# Patient Record
Sex: MALE | Race: WHITE | NOT HISPANIC OR LATINO | Employment: OTHER | URBAN - METROPOLITAN AREA
[De-identification: names, ages, dates, MRNs, and addresses within clinical notes are randomized per-mention and may not be internally consistent; named-entity substitution may affect disease eponyms.]

---

## 2020-01-01 ENCOUNTER — APPOINTMENT (OUTPATIENT)
Dept: CARDIOLOGY | Facility: MEDICAL CENTER | Age: 65
DRG: 064 | End: 2020-01-01
Attending: STUDENT IN AN ORGANIZED HEALTH CARE EDUCATION/TRAINING PROGRAM
Payer: COMMERCIAL

## 2020-01-01 ENCOUNTER — APPOINTMENT (OUTPATIENT)
Dept: RADIOLOGY | Facility: MEDICAL CENTER | Age: 65
DRG: 064 | End: 2020-01-01
Attending: STUDENT IN AN ORGANIZED HEALTH CARE EDUCATION/TRAINING PROGRAM
Payer: COMMERCIAL

## 2020-01-01 ENCOUNTER — HOSPITAL ENCOUNTER (OUTPATIENT)
Dept: RADIOLOGY | Facility: MEDICAL CENTER | Age: 65
End: 2020-09-14
Payer: MEDICARE

## 2020-01-01 ENCOUNTER — APPOINTMENT (OUTPATIENT)
Dept: RADIOLOGY | Facility: MEDICAL CENTER | Age: 65
DRG: 064 | End: 2020-01-01
Attending: INTERNAL MEDICINE
Payer: COMMERCIAL

## 2020-01-01 ENCOUNTER — HOSPITAL ENCOUNTER (INPATIENT)
Facility: REHABILITATION | Age: 65
End: 2020-01-01
Attending: PHYSICAL MEDICINE & REHABILITATION | Admitting: PHYSICAL MEDICINE & REHABILITATION
Payer: MEDICARE

## 2020-01-01 ENCOUNTER — HOSPITAL ENCOUNTER (INPATIENT)
Facility: MEDICAL CENTER | Age: 65
LOS: 16 days | DRG: 064 | End: 2020-12-30
Attending: HOSPITALIST | Admitting: HOSPITALIST
Payer: COMMERCIAL

## 2020-01-01 ENCOUNTER — HOSPITAL ENCOUNTER (OUTPATIENT)
Dept: RADIOLOGY | Facility: MEDICAL CENTER | Age: 65
End: 2020-12-15
Attending: STUDENT IN AN ORGANIZED HEALTH CARE EDUCATION/TRAINING PROGRAM

## 2020-01-01 VITALS
TEMPERATURE: 98.2 F | RESPIRATION RATE: 22 BRPM | SYSTOLIC BLOOD PRESSURE: 55 MMHG | BODY MASS INDEX: 25.37 KG/M2 | HEIGHT: 66 IN | WEIGHT: 157.85 LBS | HEART RATE: 123 BPM | OXYGEN SATURATION: 96 % | DIASTOLIC BLOOD PRESSURE: 38 MMHG

## 2020-01-01 DIAGNOSIS — J12.82 PNEUMONIA DUE TO COVID-19 VIRUS: ICD-10-CM

## 2020-01-01 DIAGNOSIS — I63.9 CEREBROVASCULAR ACCIDENT (CVA), UNSPECIFIED MECHANISM (HCC): ICD-10-CM

## 2020-01-01 DIAGNOSIS — U07.1 PNEUMONIA DUE TO COVID-19 VIRUS: ICD-10-CM

## 2020-01-01 DIAGNOSIS — J96.01 ACUTE RESPIRATORY FAILURE WITH HYPOXIA (HCC): ICD-10-CM

## 2020-01-01 LAB
ALBUMIN SERPL BCP-MCNC: 3.2 G/DL (ref 3.2–4.9)
ALBUMIN SERPL BCP-MCNC: 3.2 G/DL (ref 3.2–4.9)
ALBUMIN SERPL BCP-MCNC: 3.3 G/DL (ref 3.2–4.9)
ALBUMIN SERPL BCP-MCNC: 3.4 G/DL (ref 3.2–4.9)
ALBUMIN SERPL BCP-MCNC: 3.4 G/DL (ref 3.2–4.9)
ALBUMIN SERPL BCP-MCNC: 3.6 G/DL (ref 3.2–4.9)
ALBUMIN SERPL BCP-MCNC: 3.6 G/DL (ref 3.2–4.9)
ALBUMIN SERPL BCP-MCNC: 3.7 G/DL (ref 3.2–4.9)
ALBUMIN SERPL BCP-MCNC: 3.7 G/DL (ref 3.2–4.9)
ALBUMIN SERPL BCP-MCNC: 4.7 G/DL (ref 3.2–4.9)
ALBUMIN/GLOB SERPL: 0.7 G/DL
ALBUMIN/GLOB SERPL: 1 G/DL
ALBUMIN/GLOB SERPL: 1.1 G/DL
ALBUMIN/GLOB SERPL: 1.2 G/DL
ALBUMIN/GLOB SERPL: 1.2 G/DL
ALBUMIN/GLOB SERPL: 1.7 G/DL
ALP SERPL-CCNC: 45 U/L (ref 30–99)
ALP SERPL-CCNC: 45 U/L (ref 30–99)
ALP SERPL-CCNC: 47 U/L (ref 30–99)
ALP SERPL-CCNC: 49 U/L (ref 30–99)
ALP SERPL-CCNC: 51 U/L (ref 30–99)
ALP SERPL-CCNC: 55 U/L (ref 30–99)
ALP SERPL-CCNC: 56 U/L (ref 30–99)
ALP SERPL-CCNC: 60 U/L (ref 30–99)
ALP SERPL-CCNC: 66 U/L (ref 30–99)
ALP SERPL-CCNC: 84 U/L (ref 30–99)
ALT SERPL-CCNC: 23 U/L (ref 2–50)
ALT SERPL-CCNC: 26 U/L (ref 2–50)
ALT SERPL-CCNC: 27 U/L (ref 2–50)
ALT SERPL-CCNC: 30 U/L (ref 2–50)
ALT SERPL-CCNC: 32 U/L (ref 2–50)
ALT SERPL-CCNC: 33 U/L (ref 2–50)
ALT SERPL-CCNC: 34 U/L (ref 2–50)
ALT SERPL-CCNC: 37 U/L (ref 2–50)
ALT SERPL-CCNC: 40 U/L (ref 2–50)
ALT SERPL-CCNC: 44 U/L (ref 2–50)
AMMONIA PLAS-SCNC: 30 UMOL/L (ref 11–45)
ANION GAP SERPL CALC-SCNC: 10 MMOL/L (ref 7–16)
ANION GAP SERPL CALC-SCNC: 12 MMOL/L (ref 7–16)
ANION GAP SERPL CALC-SCNC: 12 MMOL/L (ref 7–16)
ANION GAP SERPL CALC-SCNC: 13 MMOL/L (ref 7–16)
ANION GAP SERPL CALC-SCNC: 13 MMOL/L (ref 7–16)
ANION GAP SERPL CALC-SCNC: 14 MMOL/L (ref 7–16)
ANION GAP SERPL CALC-SCNC: 15 MMOL/L (ref 7–16)
ANION GAP SERPL CALC-SCNC: 17 MMOL/L (ref 7–16)
ANION GAP SERPL CALC-SCNC: 8 MMOL/L (ref 7–16)
ANION GAP SERPL CALC-SCNC: 8 MMOL/L (ref 7–16)
APPEARANCE UR: CLEAR
APPEARANCE UR: CLEAR
APTT PPP: 57.2 SEC (ref 24.7–36)
AST SERPL-CCNC: 19 U/L (ref 12–45)
AST SERPL-CCNC: 19 U/L (ref 12–45)
AST SERPL-CCNC: 24 U/L (ref 12–45)
AST SERPL-CCNC: 31 U/L (ref 12–45)
AST SERPL-CCNC: 37 U/L (ref 12–45)
AST SERPL-CCNC: 43 U/L (ref 12–45)
AST SERPL-CCNC: 44 U/L (ref 12–45)
AST SERPL-CCNC: 44 U/L (ref 12–45)
AST SERPL-CCNC: 47 U/L (ref 12–45)
AST SERPL-CCNC: 53 U/L (ref 12–45)
BACTERIA #/AREA URNS HPF: NEGATIVE /HPF
BACTERIA #/AREA URNS HPF: NEGATIVE /HPF
BACTERIA BLD CULT: NORMAL
BASE EXCESS BLDA CALC-SCNC: -4 MMOL/L (ref -4–3)
BASE EXCESS BLDA CALC-SCNC: -8 MMOL/L (ref -4–3)
BASOPHILS # BLD AUTO: 0.2 % (ref 0–1.8)
BASOPHILS # BLD AUTO: 0.3 % (ref 0–1.8)
BASOPHILS # BLD AUTO: 0.4 % (ref 0–1.8)
BASOPHILS # BLD AUTO: 0.5 % (ref 0–1.8)
BASOPHILS # BLD: 0.01 K/UL (ref 0–0.12)
BASOPHILS # BLD: 0.02 K/UL (ref 0–0.12)
BASOPHILS # BLD: 0.03 K/UL (ref 0–0.12)
BASOPHILS # BLD: 0.04 K/UL (ref 0–0.12)
BASOPHILS # BLD: 0.04 K/UL (ref 0–0.12)
BILIRUB SERPL-MCNC: 0.4 MG/DL (ref 0.1–1.5)
BILIRUB SERPL-MCNC: 0.5 MG/DL (ref 0.1–1.5)
BILIRUB SERPL-MCNC: 0.5 MG/DL (ref 0.1–1.5)
BILIRUB SERPL-MCNC: 0.6 MG/DL (ref 0.1–1.5)
BILIRUB SERPL-MCNC: 0.7 MG/DL (ref 0.1–1.5)
BILIRUB SERPL-MCNC: 0.7 MG/DL (ref 0.1–1.5)
BILIRUB UR QL STRIP.AUTO: NEGATIVE
BILIRUB UR QL STRIP.AUTO: NEGATIVE
BODY TEMPERATURE: ABNORMAL CENTIGRADE
BODY TEMPERATURE: ABNORMAL CENTIGRADE
BUN SERPL-MCNC: 19 MG/DL (ref 8–22)
BUN SERPL-MCNC: 21 MG/DL (ref 8–22)
BUN SERPL-MCNC: 23 MG/DL (ref 8–22)
BUN SERPL-MCNC: 24 MG/DL (ref 8–22)
BUN SERPL-MCNC: 24 MG/DL (ref 8–22)
BUN SERPL-MCNC: 25 MG/DL (ref 8–22)
BUN SERPL-MCNC: 31 MG/DL (ref 8–22)
BUN SERPL-MCNC: 32 MG/DL (ref 8–22)
BUN SERPL-MCNC: 32 MG/DL (ref 8–22)
BUN SERPL-MCNC: 34 MG/DL (ref 8–22)
BUN SERPL-MCNC: 37 MG/DL (ref 8–22)
BUN SERPL-MCNC: 39 MG/DL (ref 8–22)
BUN SERPL-MCNC: 42 MG/DL (ref 8–22)
BUN SERPL-MCNC: 48 MG/DL (ref 8–22)
BUN SERPL-MCNC: 49 MG/DL (ref 8–22)
C DIFF DNA SPEC QL NAA+PROBE: NEGATIVE
C DIFF TOX GENS STL QL NAA+PROBE: NEGATIVE
CALCIUM SERPL-MCNC: 8.7 MG/DL (ref 8.5–10.5)
CALCIUM SERPL-MCNC: 8.8 MG/DL (ref 8.5–10.5)
CALCIUM SERPL-MCNC: 8.9 MG/DL (ref 8.5–10.5)
CALCIUM SERPL-MCNC: 9 MG/DL (ref 8.5–10.5)
CALCIUM SERPL-MCNC: 9.1 MG/DL (ref 8.5–10.5)
CALCIUM SERPL-MCNC: 9.2 MG/DL (ref 8.5–10.5)
CALCIUM SERPL-MCNC: 9.3 MG/DL (ref 8.5–10.5)
CALCIUM SERPL-MCNC: 9.4 MG/DL (ref 8.5–10.5)
CALCIUM SERPL-MCNC: 9.5 MG/DL (ref 8.5–10.5)
CALCIUM SERPL-MCNC: 9.5 MG/DL (ref 8.5–10.5)
CALCIUM SERPL-MCNC: 9.7 MG/DL (ref 8.5–10.5)
CHLORIDE SERPL-SCNC: 100 MMOL/L (ref 96–112)
CHLORIDE SERPL-SCNC: 101 MMOL/L (ref 96–112)
CHLORIDE SERPL-SCNC: 101 MMOL/L (ref 96–112)
CHLORIDE SERPL-SCNC: 103 MMOL/L (ref 96–112)
CHLORIDE SERPL-SCNC: 104 MMOL/L (ref 96–112)
CHLORIDE SERPL-SCNC: 105 MMOL/L (ref 96–112)
CHLORIDE SERPL-SCNC: 106 MMOL/L (ref 96–112)
CHLORIDE SERPL-SCNC: 107 MMOL/L (ref 96–112)
CHLORIDE SERPL-SCNC: 107 MMOL/L (ref 96–112)
CHLORIDE SERPL-SCNC: 108 MMOL/L (ref 96–112)
CHLORIDE SERPL-SCNC: 108 MMOL/L (ref 96–112)
CHLORIDE SERPL-SCNC: 109 MMOL/L (ref 96–112)
CHLORIDE SERPL-SCNC: 112 MMOL/L (ref 96–112)
CHOLEST SERPL-MCNC: 105 MG/DL (ref 100–199)
CK SERPL-CCNC: 76 U/L (ref 0–154)
CO2 SERPL-SCNC: 12 MMOL/L (ref 20–33)
CO2 SERPL-SCNC: 13 MMOL/L (ref 20–33)
CO2 SERPL-SCNC: 13 MMOL/L (ref 20–33)
CO2 SERPL-SCNC: 14 MMOL/L (ref 20–33)
CO2 SERPL-SCNC: 17 MMOL/L (ref 20–33)
CO2 SERPL-SCNC: 18 MMOL/L (ref 20–33)
CO2 SERPL-SCNC: 19 MMOL/L (ref 20–33)
CO2 SERPL-SCNC: 20 MMOL/L (ref 20–33)
COLOR UR: ABNORMAL
COLOR UR: YELLOW
CREAT SERPL-MCNC: 0.74 MG/DL (ref 0.5–1.4)
CREAT SERPL-MCNC: 0.83 MG/DL (ref 0.5–1.4)
CREAT SERPL-MCNC: 0.9 MG/DL (ref 0.5–1.4)
CREAT SERPL-MCNC: 0.9 MG/DL (ref 0.5–1.4)
CREAT SERPL-MCNC: 1.09 MG/DL (ref 0.5–1.4)
CREAT SERPL-MCNC: 1.15 MG/DL (ref 0.5–1.4)
CREAT SERPL-MCNC: 1.18 MG/DL (ref 0.5–1.4)
CREAT SERPL-MCNC: 1.21 MG/DL (ref 0.5–1.4)
CREAT SERPL-MCNC: 1.22 MG/DL (ref 0.5–1.4)
CREAT SERPL-MCNC: 1.25 MG/DL (ref 0.5–1.4)
CREAT SERPL-MCNC: 1.38 MG/DL (ref 0.5–1.4)
CREAT SERPL-MCNC: 1.39 MG/DL (ref 0.5–1.4)
CREAT SERPL-MCNC: 1.42 MG/DL (ref 0.5–1.4)
CREAT SERPL-MCNC: 1.6 MG/DL (ref 0.5–1.4)
CREAT SERPL-MCNC: 1.61 MG/DL (ref 0.5–1.4)
CRP SERPL HS-MCNC: 1.41 MG/DL (ref 0–0.75)
CRP SERPL HS-MCNC: 12.88 MG/DL (ref 0–0.75)
CRP SERPL HS-MCNC: 4.48 MG/DL (ref 0–0.75)
CRP SERPL HS-MCNC: 5 MG/DL (ref 0–0.75)
CRP SERPL HS-MCNC: 5.35 MG/DL (ref 0–0.75)
CRP SERPL HS-MCNC: 9.22 MG/DL (ref 0–0.75)
CRP SERPL HS-MCNC: 9.68 MG/DL (ref 0–0.75)
D DIMER PPP IA.FEU-MCNC: 0.28 UG/ML (FEU) (ref 0–0.5)
D DIMER PPP IA.FEU-MCNC: 0.32 UG/ML (FEU) (ref 0–0.5)
D DIMER PPP IA.FEU-MCNC: 0.37 UG/ML (FEU) (ref 0–0.5)
D DIMER PPP IA.FEU-MCNC: 0.41 UG/ML (FEU) (ref 0–0.5)
D DIMER PPP IA.FEU-MCNC: 0.44 UG/ML (FEU) (ref 0–0.5)
D DIMER PPP IA.FEU-MCNC: 0.45 UG/ML (FEU) (ref 0–0.5)
D DIMER PPP IA.FEU-MCNC: 0.5 UG/ML (FEU) (ref 0–0.5)
D DIMER PPP IA.FEU-MCNC: <0.27 UG/ML (FEU) (ref 0–0.5)
EKG IMPRESSION: NORMAL
EOSINOPHIL # BLD AUTO: 0 K/UL (ref 0–0.51)
EOSINOPHIL # BLD AUTO: 0.01 K/UL (ref 0–0.51)
EOSINOPHIL # BLD AUTO: 0.01 K/UL (ref 0–0.51)
EOSINOPHIL # BLD AUTO: 0.02 K/UL (ref 0–0.51)
EOSINOPHIL # BLD AUTO: 0.05 K/UL (ref 0–0.51)
EOSINOPHIL # BLD AUTO: 0.08 K/UL (ref 0–0.51)
EOSINOPHIL NFR BLD: 0 % (ref 0–6.9)
EOSINOPHIL NFR BLD: 0.1 % (ref 0–6.9)
EOSINOPHIL NFR BLD: 0.2 % (ref 0–6.9)
EOSINOPHIL NFR BLD: 0.3 % (ref 0–6.9)
EOSINOPHIL NFR BLD: 0.5 % (ref 0–6.9)
EOSINOPHIL NFR BLD: 1.5 % (ref 0–6.9)
EPI CELLS #/AREA URNS HPF: NEGATIVE /HPF
EPI CELLS #/AREA URNS HPF: NEGATIVE /HPF
ERYTHROCYTE [DISTWIDTH] IN BLOOD BY AUTOMATED COUNT: 43.5 FL (ref 35.9–50)
ERYTHROCYTE [DISTWIDTH] IN BLOOD BY AUTOMATED COUNT: 43.6 FL (ref 35.9–50)
ERYTHROCYTE [DISTWIDTH] IN BLOOD BY AUTOMATED COUNT: 43.8 FL (ref 35.9–50)
ERYTHROCYTE [DISTWIDTH] IN BLOOD BY AUTOMATED COUNT: 44.6 FL (ref 35.9–50)
ERYTHROCYTE [DISTWIDTH] IN BLOOD BY AUTOMATED COUNT: 44.7 FL (ref 35.9–50)
ERYTHROCYTE [DISTWIDTH] IN BLOOD BY AUTOMATED COUNT: 44.7 FL (ref 35.9–50)
ERYTHROCYTE [DISTWIDTH] IN BLOOD BY AUTOMATED COUNT: 44.9 FL (ref 35.9–50)
ERYTHROCYTE [DISTWIDTH] IN BLOOD BY AUTOMATED COUNT: 45.5 FL (ref 35.9–50)
ERYTHROCYTE [DISTWIDTH] IN BLOOD BY AUTOMATED COUNT: 45.6 FL (ref 35.9–50)
ERYTHROCYTE [DISTWIDTH] IN BLOOD BY AUTOMATED COUNT: 45.7 FL (ref 35.9–50)
ERYTHROCYTE [DISTWIDTH] IN BLOOD BY AUTOMATED COUNT: 45.9 FL (ref 35.9–50)
ERYTHROCYTE [DISTWIDTH] IN BLOOD BY AUTOMATED COUNT: 46.1 FL (ref 35.9–50)
ERYTHROCYTE [DISTWIDTH] IN BLOOD BY AUTOMATED COUNT: 46.2 FL (ref 35.9–50)
EST. AVERAGE GLUCOSE BLD GHB EST-MCNC: 111 MG/DL
GLOBULIN SER CALC-MCNC: 2.8 G/DL (ref 1.9–3.5)
GLOBULIN SER CALC-MCNC: 3 G/DL (ref 1.9–3.5)
GLOBULIN SER CALC-MCNC: 3.1 G/DL (ref 1.9–3.5)
GLOBULIN SER CALC-MCNC: 3.2 G/DL (ref 1.9–3.5)
GLOBULIN SER CALC-MCNC: 3.3 G/DL (ref 1.9–3.5)
GLOBULIN SER CALC-MCNC: 3.4 G/DL (ref 1.9–3.5)
GLOBULIN SER CALC-MCNC: 3.6 G/DL (ref 1.9–3.5)
GLOBULIN SER CALC-MCNC: 4.3 G/DL (ref 1.9–3.5)
GLUCOSE BLD-MCNC: 142 MG/DL (ref 65–99)
GLUCOSE BLD-MCNC: 87 MG/DL (ref 65–99)
GLUCOSE BLD-MCNC: 93 MG/DL (ref 65–99)
GLUCOSE SERPL-MCNC: 106 MG/DL (ref 65–99)
GLUCOSE SERPL-MCNC: 106 MG/DL (ref 65–99)
GLUCOSE SERPL-MCNC: 108 MG/DL (ref 65–99)
GLUCOSE SERPL-MCNC: 111 MG/DL (ref 65–99)
GLUCOSE SERPL-MCNC: 116 MG/DL (ref 65–99)
GLUCOSE SERPL-MCNC: 119 MG/DL (ref 65–99)
GLUCOSE SERPL-MCNC: 136 MG/DL (ref 65–99)
GLUCOSE SERPL-MCNC: 139 MG/DL (ref 65–99)
GLUCOSE SERPL-MCNC: 140 MG/DL (ref 65–99)
GLUCOSE SERPL-MCNC: 145 MG/DL (ref 65–99)
GLUCOSE SERPL-MCNC: 150 MG/DL (ref 65–99)
GLUCOSE SERPL-MCNC: 174 MG/DL (ref 65–99)
GLUCOSE SERPL-MCNC: 88 MG/DL (ref 65–99)
GLUCOSE SERPL-MCNC: 89 MG/DL (ref 65–99)
GLUCOSE SERPL-MCNC: 97 MG/DL (ref 65–99)
GLUCOSE UR STRIP.AUTO-MCNC: NEGATIVE MG/DL
GLUCOSE UR STRIP.AUTO-MCNC: NEGATIVE MG/DL
HBA1C MFR BLD: 5.5 % (ref 0–5.6)
HCO3 BLDA-SCNC: 13 MMOL/L (ref 17–25)
HCO3 BLDA-SCNC: 16 MMOL/L (ref 17–25)
HCT VFR BLD AUTO: 37.4 % (ref 42–52)
HCT VFR BLD AUTO: 37.6 % (ref 42–52)
HCT VFR BLD AUTO: 37.7 % (ref 42–52)
HCT VFR BLD AUTO: 40.1 % (ref 42–52)
HCT VFR BLD AUTO: 40.4 % (ref 42–52)
HCT VFR BLD AUTO: 40.8 % (ref 42–52)
HCT VFR BLD AUTO: 41.8 % (ref 42–52)
HCT VFR BLD AUTO: 42.1 % (ref 42–52)
HCT VFR BLD AUTO: 42.1 % (ref 42–52)
HCT VFR BLD AUTO: 42.2 % (ref 42–52)
HCT VFR BLD AUTO: 45 % (ref 42–52)
HCT VFR BLD AUTO: 45.4 % (ref 42–52)
HCT VFR BLD AUTO: 45.6 % (ref 42–52)
HDLC SERPL-MCNC: 35 MG/DL
HEMOCCULT STL QL: POSITIVE
HGB BLD-MCNC: 12.3 G/DL (ref 14–18)
HGB BLD-MCNC: 12.5 G/DL (ref 14–18)
HGB BLD-MCNC: 12.5 G/DL (ref 14–18)
HGB BLD-MCNC: 13.3 G/DL (ref 14–18)
HGB BLD-MCNC: 13.6 G/DL (ref 14–18)
HGB BLD-MCNC: 13.8 G/DL (ref 14–18)
HGB BLD-MCNC: 13.8 G/DL (ref 14–18)
HGB BLD-MCNC: 14 G/DL (ref 14–18)
HGB BLD-MCNC: 14.1 G/DL (ref 14–18)
HGB BLD-MCNC: 14.4 G/DL (ref 14–18)
HGB BLD-MCNC: 15 G/DL (ref 14–18)
HYALINE CASTS #/AREA URNS LPF: ABNORMAL /LPF
HYALINE CASTS #/AREA URNS LPF: ABNORMAL /LPF
IMM GRANULOCYTES # BLD AUTO: 0.01 K/UL (ref 0–0.11)
IMM GRANULOCYTES # BLD AUTO: 0.01 K/UL (ref 0–0.11)
IMM GRANULOCYTES # BLD AUTO: 0.02 K/UL (ref 0–0.11)
IMM GRANULOCYTES # BLD AUTO: 0.03 K/UL (ref 0–0.11)
IMM GRANULOCYTES # BLD AUTO: 0.03 K/UL (ref 0–0.11)
IMM GRANULOCYTES # BLD AUTO: 0.04 K/UL (ref 0–0.11)
IMM GRANULOCYTES # BLD AUTO: 0.14 K/UL (ref 0–0.11)
IMM GRANULOCYTES NFR BLD AUTO: 0.2 % (ref 0–0.9)
IMM GRANULOCYTES NFR BLD AUTO: 0.2 % (ref 0–0.9)
IMM GRANULOCYTES NFR BLD AUTO: 0.3 % (ref 0–0.9)
IMM GRANULOCYTES NFR BLD AUTO: 0.4 % (ref 0–0.9)
IMM GRANULOCYTES NFR BLD AUTO: 0.5 % (ref 0–0.9)
IMM GRANULOCYTES NFR BLD AUTO: 0.5 % (ref 0–0.9)
IMM GRANULOCYTES NFR BLD AUTO: 1.2 % (ref 0–0.9)
INR PPP: 1.4 (ref 0.87–1.13)
KETONES UR STRIP.AUTO-MCNC: ABNORMAL MG/DL
KETONES UR STRIP.AUTO-MCNC: NEGATIVE MG/DL
LACTATE BLD-SCNC: 1.2 MMOL/L (ref 0.5–2)
LACTATE BLD-SCNC: 1.2 MMOL/L (ref 0.5–2)
LACTATE BLD-SCNC: 1.4 MMOL/L (ref 0.5–2)
LACTATE BLD-SCNC: 1.7 MMOL/L (ref 0.5–2)
LACTATE BLD-SCNC: 1.9 MMOL/L (ref 0.5–2)
LACTATE BLD-SCNC: 1.9 MMOL/L (ref 0.5–2)
LACTATE BLD-SCNC: 2.4 MMOL/L (ref 0.5–2)
LDLC SERPL CALC-MCNC: 48 MG/DL
LEUKOCYTE ESTERASE UR QL STRIP.AUTO: NEGATIVE
LEUKOCYTE ESTERASE UR QL STRIP.AUTO: NEGATIVE
LV EJECT FRACT  99904: 65
LV EJECT FRACT MOD 2C 99903: 31.37
LV EJECT FRACT MOD 4C 99902: 56.81
LV EJECT FRACT MOD BP 99901: 47.19
LYMPHOCYTES # BLD AUTO: 0.37 K/UL (ref 1–4.8)
LYMPHOCYTES # BLD AUTO: 0.48 K/UL (ref 1–4.8)
LYMPHOCYTES # BLD AUTO: 0.67 K/UL (ref 1–4.8)
LYMPHOCYTES # BLD AUTO: 0.73 K/UL (ref 1–4.8)
LYMPHOCYTES # BLD AUTO: 0.81 K/UL (ref 1–4.8)
LYMPHOCYTES # BLD AUTO: 0.84 K/UL (ref 1–4.8)
LYMPHOCYTES # BLD AUTO: 1.07 K/UL (ref 1–4.8)
LYMPHOCYTES # BLD AUTO: 1.19 K/UL (ref 1–4.8)
LYMPHOCYTES # BLD AUTO: 1.37 K/UL (ref 1–4.8)
LYMPHOCYTES NFR BLD: 11.7 % (ref 22–41)
LYMPHOCYTES NFR BLD: 19.9 % (ref 22–41)
LYMPHOCYTES NFR BLD: 20.4 % (ref 22–41)
LYMPHOCYTES NFR BLD: 22.2 % (ref 22–41)
LYMPHOCYTES NFR BLD: 22.9 % (ref 22–41)
LYMPHOCYTES NFR BLD: 3.2 % (ref 22–41)
LYMPHOCYTES NFR BLD: 7.1 % (ref 22–41)
LYMPHOCYTES NFR BLD: 7.7 % (ref 22–41)
LYMPHOCYTES NFR BLD: 8.7 % (ref 22–41)
MAGNESIUM SERPL-MCNC: 2 MG/DL (ref 1.5–2.5)
MAGNESIUM SERPL-MCNC: 2.1 MG/DL (ref 1.5–2.5)
MAGNESIUM SERPL-MCNC: 2.2 MG/DL (ref 1.5–2.5)
MAGNESIUM SERPL-MCNC: 2.9 MG/DL (ref 1.5–2.5)
MCH RBC QN AUTO: 30.4 PG (ref 27–33)
MCH RBC QN AUTO: 30.4 PG (ref 27–33)
MCH RBC QN AUTO: 30.7 PG (ref 27–33)
MCH RBC QN AUTO: 30.8 PG (ref 27–33)
MCH RBC QN AUTO: 31 PG (ref 27–33)
MCH RBC QN AUTO: 31.2 PG (ref 27–33)
MCH RBC QN AUTO: 31.2 PG (ref 27–33)
MCH RBC QN AUTO: 31.3 PG (ref 27–33)
MCH RBC QN AUTO: 31.4 PG (ref 27–33)
MCH RBC QN AUTO: 31.6 PG (ref 27–33)
MCH RBC QN AUTO: 31.6 PG (ref 27–33)
MCH RBC QN AUTO: 31.7 PG (ref 27–33)
MCH RBC QN AUTO: 32 PG (ref 27–33)
MCHC RBC AUTO-ENTMCNC: 31.7 G/DL (ref 33.7–35.3)
MCHC RBC AUTO-ENTMCNC: 32 G/DL (ref 33.7–35.3)
MCHC RBC AUTO-ENTMCNC: 32.7 G/DL (ref 33.7–35.3)
MCHC RBC AUTO-ENTMCNC: 32.7 G/DL (ref 33.7–35.3)
MCHC RBC AUTO-ENTMCNC: 32.9 G/DL (ref 33.7–35.3)
MCHC RBC AUTO-ENTMCNC: 33 G/DL (ref 33.7–35.3)
MCHC RBC AUTO-ENTMCNC: 33.2 G/DL (ref 33.7–35.3)
MCHC RBC AUTO-ENTMCNC: 33.2 G/DL (ref 33.7–35.3)
MCHC RBC AUTO-ENTMCNC: 33.3 G/DL (ref 33.7–35.3)
MCHC RBC AUTO-ENTMCNC: 33.4 G/DL (ref 33.7–35.3)
MCHC RBC AUTO-ENTMCNC: 33.5 G/DL (ref 33.7–35.3)
MCHC RBC AUTO-ENTMCNC: 34.2 G/DL (ref 33.7–35.3)
MCHC RBC AUTO-ENTMCNC: 34.7 G/DL (ref 33.7–35.3)
MCV RBC AUTO: 92.4 FL (ref 81.4–97.8)
MCV RBC AUTO: 92.7 FL (ref 81.4–97.8)
MCV RBC AUTO: 92.8 FL (ref 81.4–97.8)
MCV RBC AUTO: 93.1 FL (ref 81.4–97.8)
MCV RBC AUTO: 93.3 FL (ref 81.4–97.8)
MCV RBC AUTO: 93.8 FL (ref 81.4–97.8)
MCV RBC AUTO: 94.1 FL (ref 81.4–97.8)
MCV RBC AUTO: 94.3 FL (ref 81.4–97.8)
MCV RBC AUTO: 94.7 FL (ref 81.4–97.8)
MCV RBC AUTO: 95.1 FL (ref 81.4–97.8)
MCV RBC AUTO: 95.8 FL (ref 81.4–97.8)
MCV RBC AUTO: 95.9 FL (ref 81.4–97.8)
MCV RBC AUTO: 96.2 FL (ref 81.4–97.8)
MICRO URNS: ABNORMAL
MICRO URNS: ABNORMAL
MONOCYTES # BLD AUTO: 0.32 K/UL (ref 0–0.85)
MONOCYTES # BLD AUTO: 0.36 K/UL (ref 0–0.85)
MONOCYTES # BLD AUTO: 0.46 K/UL (ref 0–0.85)
MONOCYTES # BLD AUTO: 0.48 K/UL (ref 0–0.85)
MONOCYTES # BLD AUTO: 0.51 K/UL (ref 0–0.85)
MONOCYTES # BLD AUTO: 0.6 K/UL (ref 0–0.85)
MONOCYTES # BLD AUTO: 0.91 K/UL (ref 0–0.85)
MONOCYTES # BLD AUTO: 1.3 K/UL (ref 0–0.85)
MONOCYTES # BLD AUTO: 1.81 K/UL (ref 0–0.85)
MONOCYTES NFR BLD AUTO: 11.5 % (ref 0–13.4)
MONOCYTES NFR BLD AUTO: 11.8 % (ref 0–13.4)
MONOCYTES NFR BLD AUTO: 13.5 % (ref 0–13.4)
MONOCYTES NFR BLD AUTO: 14.7 % (ref 0–13.4)
MONOCYTES NFR BLD AUTO: 2.7 % (ref 0–13.4)
MONOCYTES NFR BLD AUTO: 20.9 % (ref 0–13.4)
MONOCYTES NFR BLD AUTO: 5.3 % (ref 0–13.4)
MONOCYTES NFR BLD AUTO: 8.2 % (ref 0–13.4)
MONOCYTES NFR BLD AUTO: 8.8 % (ref 0–13.4)
MRSA DNA SPEC QL NAA+PROBE: NORMAL
MRSA DNA SPEC QL NAA+PROBE: NORMAL
NEUTROPHILS # BLD AUTO: 10.87 K/UL (ref 1.82–7.42)
NEUTROPHILS # BLD AUTO: 2.75 K/UL (ref 1.82–7.42)
NEUTROPHILS # BLD AUTO: 3.45 K/UL (ref 1.82–7.42)
NEUTROPHILS # BLD AUTO: 3.53 K/UL (ref 1.82–7.42)
NEUTROPHILS # BLD AUTO: 3.81 K/UL (ref 1.82–7.42)
NEUTROPHILS # BLD AUTO: 4.97 K/UL (ref 1.82–7.42)
NEUTROPHILS # BLD AUTO: 5.85 K/UL (ref 1.82–7.42)
NEUTROPHILS # BLD AUTO: 6.11 K/UL (ref 1.82–7.42)
NEUTROPHILS # BLD AUTO: 7.37 K/UL (ref 1.82–7.42)
NEUTROPHILS NFR BLD: 61.8 % (ref 44–72)
NEUTROPHILS NFR BLD: 66.4 % (ref 44–72)
NEUTROPHILS NFR BLD: 67.3 % (ref 44–72)
NEUTROPHILS NFR BLD: 67.6 % (ref 44–72)
NEUTROPHILS NFR BLD: 70.3 % (ref 44–72)
NEUTROPHILS NFR BLD: 76.6 % (ref 44–72)
NEUTROPHILS NFR BLD: 79.6 % (ref 44–72)
NEUTROPHILS NFR BLD: 87 % (ref 44–72)
NEUTROPHILS NFR BLD: 92.7 % (ref 44–72)
NITRITE UR QL STRIP.AUTO: NEGATIVE
NITRITE UR QL STRIP.AUTO: NEGATIVE
NRBC # BLD AUTO: 0 K/UL
NRBC BLD-RTO: 0 /100 WBC
PCO2 BLDA: 20 MMHG (ref 26–37)
PCO2 BLDA: 20.6 MMHG (ref 26–37)
PH BLDA: 7.43 [PH] (ref 7.4–7.5)
PH BLDA: 7.53 [PH] (ref 7.4–7.5)
PH UR STRIP.AUTO: 5 [PH] (ref 5–8)
PH UR STRIP.AUTO: 5.5 [PH] (ref 5–8)
PLATELET # BLD AUTO: 131 K/UL (ref 164–446)
PLATELET # BLD AUTO: 133 K/UL (ref 164–446)
PLATELET # BLD AUTO: 149 K/UL (ref 164–446)
PLATELET # BLD AUTO: 149 K/UL (ref 164–446)
PLATELET # BLD AUTO: 150 K/UL (ref 164–446)
PLATELET # BLD AUTO: 168 K/UL (ref 164–446)
PLATELET # BLD AUTO: 182 K/UL (ref 164–446)
PLATELET # BLD AUTO: 200 K/UL (ref 164–446)
PLATELET # BLD AUTO: 202 K/UL (ref 164–446)
PLATELET # BLD AUTO: 247 K/UL (ref 164–446)
PLATELET # BLD AUTO: 250 K/UL (ref 164–446)
PLATELET # BLD AUTO: 347 K/UL (ref 164–446)
PLATELET # BLD AUTO: 360 K/UL (ref 164–446)
PMV BLD AUTO: 10.1 FL (ref 9–12.9)
PMV BLD AUTO: 10.2 FL (ref 9–12.9)
PMV BLD AUTO: 10.2 FL (ref 9–12.9)
PMV BLD AUTO: 10.3 FL (ref 9–12.9)
PMV BLD AUTO: 10.5 FL (ref 9–12.9)
PMV BLD AUTO: 9.5 FL (ref 9–12.9)
PMV BLD AUTO: 9.8 FL (ref 9–12.9)
PMV BLD AUTO: 9.8 FL (ref 9–12.9)
PMV BLD AUTO: 9.9 FL (ref 9–12.9)
PO2 BLDA: 64.2 MMHG (ref 64–87)
PO2 BLDA: 67.8 MMHG (ref 64–87)
POTASSIUM SERPL-SCNC: 3.7 MMOL/L (ref 3.6–5.5)
POTASSIUM SERPL-SCNC: 3.7 MMOL/L (ref 3.6–5.5)
POTASSIUM SERPL-SCNC: 3.8 MMOL/L (ref 3.6–5.5)
POTASSIUM SERPL-SCNC: 3.8 MMOL/L (ref 3.6–5.5)
POTASSIUM SERPL-SCNC: 4.1 MMOL/L (ref 3.6–5.5)
POTASSIUM SERPL-SCNC: 4.2 MMOL/L (ref 3.6–5.5)
POTASSIUM SERPL-SCNC: 4.3 MMOL/L (ref 3.6–5.5)
POTASSIUM SERPL-SCNC: 4.4 MMOL/L (ref 3.6–5.5)
POTASSIUM SERPL-SCNC: 4.5 MMOL/L (ref 3.6–5.5)
POTASSIUM SERPL-SCNC: 4.6 MMOL/L (ref 3.6–5.5)
PROCALCITONIN SERPL-MCNC: 0.13 NG/ML
PROCALCITONIN SERPL-MCNC: 0.16 NG/ML
PROCALCITONIN SERPL-MCNC: 0.22 NG/ML
PROT SERPL-MCNC: 6.4 G/DL (ref 6–8.2)
PROT SERPL-MCNC: 6.4 G/DL (ref 6–8.2)
PROT SERPL-MCNC: 6.5 G/DL (ref 6–8.2)
PROT SERPL-MCNC: 6.7 G/DL (ref 6–8.2)
PROT SERPL-MCNC: 6.7 G/DL (ref 6–8.2)
PROT SERPL-MCNC: 6.8 G/DL (ref 6–8.2)
PROT SERPL-MCNC: 7 G/DL (ref 6–8.2)
PROT SERPL-MCNC: 7.2 G/DL (ref 6–8.2)
PROT SERPL-MCNC: 7.5 G/DL (ref 6–8.2)
PROT SERPL-MCNC: 7.5 G/DL (ref 6–8.2)
PROT UR QL STRIP: 100 MG/DL
PROT UR QL STRIP: 100 MG/DL
PROTHROMBIN TIME: 17.6 SEC (ref 12–14.6)
RBC # BLD AUTO: 3.92 M/UL (ref 4.7–6.1)
RBC # BLD AUTO: 4 M/UL (ref 4.7–6.1)
RBC # BLD AUTO: 4.03 M/UL (ref 4.7–6.1)
RBC # BLD AUTO: 4.26 M/UL (ref 4.7–6.1)
RBC # BLD AUTO: 4.31 M/UL (ref 4.7–6.1)
RBC # BLD AUTO: 4.37 M/UL (ref 4.7–6.1)
RBC # BLD AUTO: 4.48 M/UL (ref 4.7–6.1)
RBC # BLD AUTO: 4.5 M/UL (ref 4.7–6.1)
RBC # BLD AUTO: 4.52 M/UL (ref 4.7–6.1)
RBC # BLD AUTO: 4.54 M/UL (ref 4.7–6.1)
RBC # BLD AUTO: 4.73 M/UL (ref 4.7–6.1)
RBC # BLD AUTO: 4.74 M/UL (ref 4.7–6.1)
RBC # BLD AUTO: 4.74 M/UL (ref 4.7–6.1)
RBC # URNS HPF: ABNORMAL /HPF
RBC # URNS HPF: ABNORMAL /HPF
RBC UR QL AUTO: NEGATIVE
RBC UR QL AUTO: NEGATIVE
SAO2 % BLDA: 92.4 % (ref 93–99)
SAO2 % BLDA: 92.5 % (ref 93–99)
SIGNIFICANT IND 70042: NORMAL
SITE SITE: NORMAL
SODIUM SERPL-SCNC: 131 MMOL/L (ref 135–145)
SODIUM SERPL-SCNC: 131 MMOL/L (ref 135–145)
SODIUM SERPL-SCNC: 132 MMOL/L (ref 135–145)
SODIUM SERPL-SCNC: 132 MMOL/L (ref 135–145)
SODIUM SERPL-SCNC: 133 MMOL/L (ref 135–145)
SODIUM SERPL-SCNC: 135 MMOL/L (ref 135–145)
SODIUM SERPL-SCNC: 135 MMOL/L (ref 135–145)
SODIUM SERPL-SCNC: 136 MMOL/L (ref 135–145)
SODIUM SERPL-SCNC: 138 MMOL/L (ref 135–145)
SODIUM SERPL-SCNC: 138 MMOL/L (ref 135–145)
SODIUM SERPL-SCNC: 139 MMOL/L (ref 135–145)
SODIUM SERPL-SCNC: 140 MMOL/L (ref 135–145)
SODIUM SERPL-SCNC: 141 MMOL/L (ref 135–145)
SOURCE SOURCE: NORMAL
SP GR UR STRIP.AUTO: 1.02
SP GR UR STRIP.AUTO: 1.02
TRIGL SERPL-MCNC: 110 MG/DL (ref 0–149)
TROPONIN T SERPL-MCNC: 13 NG/L (ref 6–19)
TROPONIN T SERPL-MCNC: 23 NG/L (ref 6–19)
TSH SERPL DL<=0.005 MIU/L-ACNC: 0.52 UIU/ML (ref 0.38–5.33)
UROBILINOGEN UR STRIP.AUTO-MCNC: 0.2 MG/DL
UROBILINOGEN UR STRIP.AUTO-MCNC: 0.2 MG/DL
VIT B12 SERPL-MCNC: 504 PG/ML (ref 211–911)
WBC # BLD AUTO: 11.4 K/UL (ref 4.8–10.8)
WBC # BLD AUTO: 11.7 K/UL (ref 4.8–10.8)
WBC # BLD AUTO: 4.1 K/UL (ref 4.8–10.8)
WBC # BLD AUTO: 5.2 K/UL (ref 4.8–10.8)
WBC # BLD AUTO: 5.2 K/UL (ref 4.8–10.8)
WBC # BLD AUTO: 5.9 K/UL (ref 4.8–10.8)
WBC # BLD AUTO: 6.2 K/UL (ref 4.8–10.8)
WBC # BLD AUTO: 6.2 K/UL (ref 4.8–10.8)
WBC # BLD AUTO: 6.7 K/UL (ref 4.8–10.8)
WBC # BLD AUTO: 8.5 K/UL (ref 4.8–10.8)
WBC # BLD AUTO: 8.7 K/UL (ref 4.8–10.8)
WBC # BLD AUTO: 9.6 K/UL (ref 4.8–10.8)
WBC # BLD AUTO: 9.6 K/UL (ref 4.8–10.8)
WBC #/AREA URNS HPF: ABNORMAL /HPF
WBC #/AREA URNS HPF: ABNORMAL /HPF

## 2020-01-01 PROCEDURE — 36415 COLL VENOUS BLD VENIPUNCTURE: CPT

## 2020-01-01 PROCEDURE — 700105 HCHG RX REV CODE 258: Performed by: INTERNAL MEDICINE

## 2020-01-01 PROCEDURE — 84484 ASSAY OF TROPONIN QUANT: CPT

## 2020-01-01 PROCEDURE — 87641 MR-STAPH DNA AMP PROBE: CPT

## 2020-01-01 PROCEDURE — 99232 SBSQ HOSP IP/OBS MODERATE 35: CPT | Performed by: INTERNAL MEDICINE

## 2020-01-01 PROCEDURE — A9270 NON-COVERED ITEM OR SERVICE: HCPCS | Performed by: STUDENT IN AN ORGANIZED HEALTH CARE EDUCATION/TRAINING PROGRAM

## 2020-01-01 PROCEDURE — 93010 ELECTROCARDIOGRAM REPORT: CPT | Mod: 76 | Performed by: INTERNAL MEDICINE

## 2020-01-01 PROCEDURE — 700101 HCHG RX REV CODE 250: Performed by: INTERNAL MEDICINE

## 2020-01-01 PROCEDURE — 85379 FIBRIN DEGRADATION QUANT: CPT

## 2020-01-01 PROCEDURE — 97162 PT EVAL MOD COMPLEX 30 MIN: CPT

## 2020-01-01 PROCEDURE — 85025 COMPLETE CBC W/AUTO DIFF WBC: CPT

## 2020-01-01 PROCEDURE — 94760 N-INVAS EAR/PLS OXIMETRY 1: CPT

## 2020-01-01 PROCEDURE — 770020 HCHG ROOM/CARE - TELE (206)

## 2020-01-01 PROCEDURE — A9270 NON-COVERED ITEM OR SERVICE: HCPCS | Performed by: INTERNAL MEDICINE

## 2020-01-01 PROCEDURE — 700102 HCHG RX REV CODE 250 W/ 637 OVERRIDE(OP): Performed by: INTERNAL MEDICINE

## 2020-01-01 PROCEDURE — 700102 HCHG RX REV CODE 250 W/ 637 OVERRIDE(OP): Performed by: STUDENT IN AN ORGANIZED HEALTH CARE EDUCATION/TRAINING PROGRAM

## 2020-01-01 PROCEDURE — 80053 COMPREHEN METABOLIC PANEL: CPT

## 2020-01-01 PROCEDURE — 82962 GLUCOSE BLOOD TEST: CPT

## 2020-01-01 PROCEDURE — 99233 SBSQ HOSP IP/OBS HIGH 50: CPT | Performed by: INTERNAL MEDICINE

## 2020-01-01 PROCEDURE — 82607 VITAMIN B-12: CPT

## 2020-01-01 PROCEDURE — 84145 PROCALCITONIN (PCT): CPT

## 2020-01-01 PROCEDURE — 83036 HEMOGLOBIN GLYCOSYLATED A1C: CPT

## 2020-01-01 PROCEDURE — 700102 HCHG RX REV CODE 250 W/ 637 OVERRIDE(OP): Performed by: NURSE PRACTITIONER

## 2020-01-01 PROCEDURE — 85027 COMPLETE CBC AUTOMATED: CPT

## 2020-01-01 PROCEDURE — 700102 HCHG RX REV CODE 250 W/ 637 OVERRIDE(OP): Performed by: PSYCHIATRY & NEUROLOGY

## 2020-01-01 PROCEDURE — XW033E5 INTRODUCTION OF REMDESIVIR ANTI-INFECTIVE INTO PERIPHERAL VEIN, PERCUTANEOUS APPROACH, NEW TECHNOLOGY GROUP 5: ICD-10-PCS | Performed by: INTERNAL MEDICINE

## 2020-01-01 PROCEDURE — 80048 BASIC METABOLIC PNL TOTAL CA: CPT

## 2020-01-01 PROCEDURE — 700111 HCHG RX REV CODE 636 W/ 250 OVERRIDE (IP): Performed by: INTERNAL MEDICINE

## 2020-01-01 PROCEDURE — 93005 ELECTROCARDIOGRAM TRACING: CPT | Performed by: INTERNAL MEDICINE

## 2020-01-01 PROCEDURE — 700111 HCHG RX REV CODE 636 W/ 250 OVERRIDE (IP): Performed by: STUDENT IN AN ORGANIZED HEALTH CARE EDUCATION/TRAINING PROGRAM

## 2020-01-01 PROCEDURE — 99233 SBSQ HOSP IP/OBS HIGH 50: CPT | Performed by: STUDENT IN AN ORGANIZED HEALTH CARE EDUCATION/TRAINING PROGRAM

## 2020-01-01 PROCEDURE — 97530 THERAPEUTIC ACTIVITIES: CPT

## 2020-01-01 PROCEDURE — 86140 C-REACTIVE PROTEIN: CPT

## 2020-01-01 PROCEDURE — 87040 BLOOD CULTURE FOR BACTERIA: CPT

## 2020-01-01 PROCEDURE — 82803 BLOOD GASES ANY COMBINATION: CPT

## 2020-01-01 PROCEDURE — 85610 PROTHROMBIN TIME: CPT

## 2020-01-01 PROCEDURE — 83735 ASSAY OF MAGNESIUM: CPT

## 2020-01-01 PROCEDURE — 70498 CT ANGIOGRAPHY NECK: CPT

## 2020-01-01 PROCEDURE — 92526 ORAL FUNCTION THERAPY: CPT

## 2020-01-01 PROCEDURE — 99223 1ST HOSP IP/OBS HIGH 75: CPT | Performed by: INTERNAL MEDICINE

## 2020-01-01 PROCEDURE — 83605 ASSAY OF LACTIC ACID: CPT

## 2020-01-01 PROCEDURE — 71045 X-RAY EXAM CHEST 1 VIEW: CPT

## 2020-01-01 PROCEDURE — 93010 ELECTROCARDIOGRAM REPORT: CPT | Performed by: STUDENT IN AN ORGANIZED HEALTH CARE EDUCATION/TRAINING PROGRAM

## 2020-01-01 PROCEDURE — 83605 ASSAY OF LACTIC ACID: CPT | Mod: 91

## 2020-01-01 PROCEDURE — 51798 US URINE CAPACITY MEASURE: CPT

## 2020-01-01 PROCEDURE — 70551 MRI BRAIN STEM W/O DYE: CPT

## 2020-01-01 PROCEDURE — 93306 TTE W/DOPPLER COMPLETE: CPT

## 2020-01-01 PROCEDURE — 99232 SBSQ HOSP IP/OBS MODERATE 35: CPT | Performed by: PSYCHIATRY & NEUROLOGY

## 2020-01-01 PROCEDURE — 99221 1ST HOSP IP/OBS SF/LOW 40: CPT | Performed by: PSYCHIATRY & NEUROLOGY

## 2020-01-01 PROCEDURE — 99233 SBSQ HOSP IP/OBS HIGH 50: CPT | Performed by: PSYCHIATRY & NEUROLOGY

## 2020-01-01 PROCEDURE — 94640 AIRWAY INHALATION TREATMENT: CPT

## 2020-01-01 PROCEDURE — 85730 THROMBOPLASTIN TIME PARTIAL: CPT

## 2020-01-01 PROCEDURE — 93306 TTE W/DOPPLER COMPLETE: CPT | Mod: 26 | Performed by: INTERNAL MEDICINE

## 2020-01-01 PROCEDURE — 82140 ASSAY OF AMMONIA: CPT

## 2020-01-01 PROCEDURE — 700117 HCHG RX CONTRAST REV CODE 255: Performed by: INTERNAL MEDICINE

## 2020-01-01 PROCEDURE — 93005 ELECTROCARDIOGRAM TRACING: CPT | Performed by: STUDENT IN AN ORGANIZED HEALTH CARE EDUCATION/TRAINING PROGRAM

## 2020-01-01 PROCEDURE — 99223 1ST HOSP IP/OBS HIGH 75: CPT | Performed by: PSYCHIATRY & NEUROLOGY

## 2020-01-01 PROCEDURE — 92610 EVALUATE SWALLOWING FUNCTION: CPT

## 2020-01-01 PROCEDURE — 80061 LIPID PANEL: CPT

## 2020-01-01 PROCEDURE — 90833 PSYTX W PT W E/M 30 MIN: CPT | Performed by: SOCIAL WORKER

## 2020-01-01 PROCEDURE — A9270 NON-COVERED ITEM OR SERVICE: HCPCS | Performed by: PSYCHIATRY & NEUROLOGY

## 2020-01-01 PROCEDURE — 82550 ASSAY OF CK (CPK): CPT

## 2020-01-01 PROCEDURE — 81001 URINALYSIS AUTO W/SCOPE: CPT

## 2020-01-01 PROCEDURE — 93010 ELECTROCARDIOGRAM REPORT: CPT | Performed by: INTERNAL MEDICINE

## 2020-01-01 PROCEDURE — 700105 HCHG RX REV CODE 258: Performed by: STUDENT IN AN ORGANIZED HEALTH CARE EDUCATION/TRAINING PROGRAM

## 2020-01-01 PROCEDURE — 70496 CT ANGIOGRAPHY HEAD: CPT

## 2020-01-01 PROCEDURE — 84443 ASSAY THYROID STIM HORMONE: CPT

## 2020-01-01 PROCEDURE — 700117 HCHG RX CONTRAST REV CODE 255: Performed by: STUDENT IN AN ORGANIZED HEALTH CARE EDUCATION/TRAINING PROGRAM

## 2020-01-01 PROCEDURE — 93005 ELECTROCARDIOGRAM TRACING: CPT | Performed by: HOSPITALIST

## 2020-01-01 PROCEDURE — 99358 PROLONG SERVICE W/O CONTACT: CPT | Performed by: INTERNAL MEDICINE

## 2020-01-01 PROCEDURE — 87493 C DIFF AMPLIFIED PROBE: CPT

## 2020-01-01 PROCEDURE — 74177 CT ABD & PELVIS W/CONTRAST: CPT

## 2020-01-01 PROCEDURE — A9270 NON-COVERED ITEM OR SERVICE: HCPCS | Performed by: NURSE PRACTITIONER

## 2020-01-01 PROCEDURE — 82272 OCCULT BLD FECES 1-3 TESTS: CPT

## 2020-01-01 PROCEDURE — 97165 OT EVAL LOW COMPLEX 30 MIN: CPT

## 2020-01-01 PROCEDURE — 99221 1ST HOSP IP/OBS SF/LOW 40: CPT | Performed by: STUDENT IN AN ORGANIZED HEALTH CARE EDUCATION/TRAINING PROGRAM

## 2020-01-01 PROCEDURE — 70450 CT HEAD/BRAIN W/O DYE: CPT

## 2020-01-01 RX ORDER — ASPIRIN 81 MG/1
324 TABLET, CHEWABLE ORAL DAILY
Status: DISCONTINUED | OUTPATIENT
Start: 2020-01-01 | End: 2020-01-01

## 2020-01-01 RX ORDER — TOPIRAMATE 25 MG/1
25 TABLET ORAL DAILY
COMMUNITY

## 2020-01-01 RX ORDER — ESCITALOPRAM OXALATE 10 MG/1
20 TABLET ORAL DAILY
Status: DISCONTINUED | OUTPATIENT
Start: 2020-01-01 | End: 2020-01-01

## 2020-01-01 RX ORDER — MORPHINE SULFATE 4 MG/ML
2 INJECTION, SOLUTION INTRAMUSCULAR; INTRAVENOUS EVERY 4 HOURS PRN
Status: DISCONTINUED | OUTPATIENT
Start: 2020-01-01 | End: 2020-01-01

## 2020-01-01 RX ORDER — LINEZOLID 2 MG/ML
600 INJECTION, SOLUTION INTRAVENOUS EVERY 12 HOURS
Status: DISCONTINUED | OUTPATIENT
Start: 2020-01-01 | End: 2020-01-01

## 2020-01-01 RX ORDER — LORAZEPAM 2 MG/ML
1 INJECTION INTRAMUSCULAR
Status: DISCONTINUED | OUTPATIENT
Start: 2020-01-01 | End: 2020-01-01 | Stop reason: HOSPADM

## 2020-01-01 RX ORDER — SODIUM CHLORIDE 9 MG/ML
250 INJECTION, SOLUTION INTRAVENOUS ONCE
Status: COMPLETED | OUTPATIENT
Start: 2020-01-01 | End: 2020-01-01

## 2020-01-01 RX ORDER — ASPIRIN 325 MG
325 TABLET ORAL DAILY
Status: DISCONTINUED | OUTPATIENT
Start: 2020-01-01 | End: 2020-01-01

## 2020-01-01 RX ORDER — SODIUM CHLORIDE 9 MG/ML
INJECTION, SOLUTION INTRAVENOUS CONTINUOUS
Status: ACTIVE | OUTPATIENT
Start: 2020-01-01 | End: 2020-01-01

## 2020-01-01 RX ORDER — ACETAMINOPHEN 500 MG
500 TABLET ORAL 4 TIMES DAILY PRN
COMMUNITY

## 2020-01-01 RX ORDER — TIZANIDINE 4 MG/1
4 TABLET ORAL 3 TIMES DAILY
COMMUNITY

## 2020-01-01 RX ORDER — DEXAMETHASONE 4 MG/1
6 TABLET ORAL DAILY
Status: DISCONTINUED | OUTPATIENT
Start: 2020-01-01 | End: 2020-01-01 | Stop reason: HOSPADM

## 2020-01-01 RX ORDER — LABETALOL HYDROCHLORIDE 5 MG/ML
10 INJECTION, SOLUTION INTRAVENOUS EVERY 6 HOURS PRN
Status: DISCONTINUED | OUTPATIENT
Start: 2020-01-01 | End: 2020-01-01

## 2020-01-01 RX ORDER — ACETAMINOPHEN 325 MG/1
650 TABLET ORAL EVERY 4 HOURS PRN
Status: DISCONTINUED | OUTPATIENT
Start: 2020-01-01 | End: 2020-01-01

## 2020-01-01 RX ORDER — ACETAMINOPHEN 650 MG/1
650 SUPPOSITORY RECTAL EVERY 4 HOURS PRN
Status: DISCONTINUED | OUTPATIENT
Start: 2020-01-01 | End: 2020-01-01 | Stop reason: HOSPADM

## 2020-01-01 RX ORDER — OMEPRAZOLE 20 MG/1
20 CAPSULE, DELAYED RELEASE ORAL 2 TIMES DAILY
Status: DISCONTINUED | OUTPATIENT
Start: 2020-01-01 | End: 2020-01-01

## 2020-01-01 RX ORDER — KETOROLAC TROMETHAMINE 30 MG/ML
15 INJECTION, SOLUTION INTRAMUSCULAR; INTRAVENOUS ONCE
Status: COMPLETED | OUTPATIENT
Start: 2020-01-01 | End: 2020-01-01

## 2020-01-01 RX ORDER — CLONAZEPAM 0.5 MG/1
0.5 TABLET ORAL 2 TIMES DAILY
Status: DISCONTINUED | OUTPATIENT
Start: 2020-01-01 | End: 2020-01-01 | Stop reason: HOSPADM

## 2020-01-01 RX ORDER — ALPRAZOLAM 0.5 MG/1
0.5 TABLET ORAL ONCE
Status: COMPLETED | OUTPATIENT
Start: 2020-01-01 | End: 2020-01-01

## 2020-01-01 RX ORDER — ONDANSETRON 2 MG/ML
8 INJECTION INTRAMUSCULAR; INTRAVENOUS EVERY 8 HOURS PRN
Status: DISCONTINUED | OUTPATIENT
Start: 2020-01-01 | End: 2020-01-01 | Stop reason: HOSPADM

## 2020-01-01 RX ORDER — ACETAMINOPHEN 650 MG/1
325 SUPPOSITORY RECTAL ONCE
Status: DISPENSED | OUTPATIENT
Start: 2020-01-01 | End: 2020-01-01

## 2020-01-01 RX ORDER — SODIUM CHLORIDE 9 MG/ML
INJECTION, SOLUTION INTRAVENOUS CONTINUOUS
Status: DISCONTINUED | OUTPATIENT
Start: 2020-01-01 | End: 2020-01-01

## 2020-01-01 RX ORDER — ATROPINE SULFATE 10 MG/ML
2 SOLUTION/ DROPS OPHTHALMIC EVERY 4 HOURS PRN
Status: DISCONTINUED | OUTPATIENT
Start: 2020-01-01 | End: 2020-01-01 | Stop reason: HOSPADM

## 2020-01-01 RX ORDER — LORAZEPAM 2 MG/ML
1 CONCENTRATE ORAL
Status: DISCONTINUED | OUTPATIENT
Start: 2020-01-01 | End: 2020-01-01 | Stop reason: HOSPADM

## 2020-01-01 RX ORDER — PRASUGREL 10 MG/1
10 TABLET, FILM COATED ORAL DAILY
COMMUNITY

## 2020-01-01 RX ORDER — LORAZEPAM 2 MG/ML
0.5 INJECTION INTRAMUSCULAR ONCE
Status: ACTIVE | OUTPATIENT
Start: 2020-01-01 | End: 2020-01-01

## 2020-01-01 RX ORDER — ACETAMINOPHEN 325 MG/1
650 TABLET ORAL EVERY 4 HOURS PRN
Status: DISCONTINUED | OUTPATIENT
Start: 2020-01-01 | End: 2020-01-01 | Stop reason: HOSPADM

## 2020-01-01 RX ORDER — SPIRONOLACTONE 25 MG/1
25 TABLET ORAL DAILY
COMMUNITY

## 2020-01-01 RX ORDER — MORPHINE SULFATE 4 MG/ML
2 INJECTION, SOLUTION INTRAMUSCULAR; INTRAVENOUS
Status: DISCONTINUED | OUTPATIENT
Start: 2020-01-01 | End: 2020-01-01 | Stop reason: HOSPADM

## 2020-01-01 RX ORDER — DEXAMETHASONE 4 MG/1
6 TABLET ORAL EVERY 12 HOURS
Status: DISCONTINUED | OUTPATIENT
Start: 2020-01-01 | End: 2020-01-01

## 2020-01-01 RX ORDER — HYDROCODONE BITARTRATE AND ACETAMINOPHEN 5; 325 MG/1; MG/1
1-2 TABLET ORAL EVERY 4 HOURS PRN
Status: DISCONTINUED | OUTPATIENT
Start: 2020-01-01 | End: 2020-01-01

## 2020-01-01 RX ORDER — AMLODIPINE BESYLATE 10 MG/1
10 TABLET ORAL
Status: DISCONTINUED | OUTPATIENT
Start: 2020-01-01 | End: 2020-01-01

## 2020-01-01 RX ORDER — FUROSEMIDE 10 MG/ML
40 INJECTION INTRAMUSCULAR; INTRAVENOUS ONCE
Status: COMPLETED | OUTPATIENT
Start: 2020-01-01 | End: 2020-01-01

## 2020-01-01 RX ORDER — DABIGATRAN ETEXILATE 150 MG/1
150 CAPSULE ORAL 2 TIMES DAILY
Status: DISCONTINUED | OUTPATIENT
Start: 2020-01-01 | End: 2020-01-01

## 2020-01-01 RX ORDER — AMOXICILLIN AND CLAVULANATE POTASSIUM 875; 125 MG/1; MG/1
1 TABLET, FILM COATED ORAL EVERY 12 HOURS
Status: DISCONTINUED | OUTPATIENT
Start: 2020-01-01 | End: 2020-01-01

## 2020-01-01 RX ORDER — CALCIUM CARBONATE 500 MG/1
1000 TABLET, CHEWABLE ORAL 3 TIMES DAILY PRN
Status: DISCONTINUED | OUTPATIENT
Start: 2020-01-01 | End: 2020-01-01 | Stop reason: HOSPADM

## 2020-01-01 RX ORDER — ACETAMINOPHEN 650 MG/1
325 SUPPOSITORY RECTAL ONCE
Status: COMPLETED | OUTPATIENT
Start: 2020-01-01 | End: 2020-01-01

## 2020-01-01 RX ORDER — FUROSEMIDE 10 MG/ML
20 INJECTION INTRAMUSCULAR; INTRAVENOUS
Status: DISCONTINUED | OUTPATIENT
Start: 2020-01-01 | End: 2020-01-01

## 2020-01-01 RX ORDER — CHOLECALCIFEROL (VITAMIN D3) 125 MCG
5 CAPSULE ORAL NIGHTLY PRN
Status: DISCONTINUED | OUTPATIENT
Start: 2020-01-01 | End: 2020-01-01 | Stop reason: HOSPADM

## 2020-01-01 RX ORDER — MIRTAZAPINE 15 MG/1
7.5 TABLET, FILM COATED ORAL
Status: DISCONTINUED | OUTPATIENT
Start: 2020-01-01 | End: 2020-01-01

## 2020-01-01 RX ORDER — MORPHINE SULFATE 4 MG/ML
4 INJECTION, SOLUTION INTRAMUSCULAR; INTRAVENOUS
Status: DISCONTINUED | OUTPATIENT
Start: 2020-01-01 | End: 2020-01-01 | Stop reason: HOSPADM

## 2020-01-01 RX ORDER — HYDROXYZINE HYDROCHLORIDE 25 MG/1
25 TABLET, FILM COATED ORAL
COMMUNITY

## 2020-01-01 RX ORDER — FUROSEMIDE 20 MG/1
20 TABLET ORAL
Status: DISCONTINUED | OUTPATIENT
Start: 2020-01-01 | End: 2020-01-01

## 2020-01-01 RX ORDER — LISINOPRIL 20 MG/1
20 TABLET ORAL
Status: DISCONTINUED | OUTPATIENT
Start: 2020-01-01 | End: 2020-01-01

## 2020-01-01 RX ORDER — ONDANSETRON 4 MG/1
8 TABLET, ORALLY DISINTEGRATING ORAL EVERY 8 HOURS PRN
Status: DISCONTINUED | OUTPATIENT
Start: 2020-01-01 | End: 2020-01-01 | Stop reason: HOSPADM

## 2020-01-01 RX ORDER — LABETALOL HYDROCHLORIDE 5 MG/ML
10 INJECTION, SOLUTION INTRAVENOUS EVERY 4 HOURS PRN
Status: DISCONTINUED | OUTPATIENT
Start: 2020-01-01 | End: 2020-01-01 | Stop reason: HOSPADM

## 2020-01-01 RX ORDER — ASPIRIN 300 MG/1
300 SUPPOSITORY RECTAL DAILY
Status: DISCONTINUED | OUTPATIENT
Start: 2020-01-01 | End: 2020-01-01

## 2020-01-01 RX ORDER — LACTULOSE 20 G/30ML
10 SOLUTION ORAL
Status: DISCONTINUED | OUTPATIENT
Start: 2020-01-01 | End: 2020-01-01 | Stop reason: HOSPADM

## 2020-01-01 RX ORDER — ATORVASTATIN CALCIUM 80 MG/1
80 TABLET, FILM COATED ORAL EVERY EVENING
Status: DISCONTINUED | OUTPATIENT
Start: 2020-01-01 | End: 2020-01-01

## 2020-01-01 RX ORDER — BISACODYL 10 MG
10 SUPPOSITORY, RECTAL RECTAL
Status: DISCONTINUED | OUTPATIENT
Start: 2020-01-01 | End: 2020-01-01 | Stop reason: HOSPADM

## 2020-01-01 RX ORDER — HYDROCODONE BITARTRATE AND ACETAMINOPHEN 5; 325 MG/1; MG/1
1-2 TABLET ORAL EVERY 6 HOURS PRN
Status: DISCONTINUED | OUTPATIENT
Start: 2020-01-01 | End: 2020-01-01

## 2020-01-01 RX ORDER — TIZANIDINE 4 MG/1
4 TABLET ORAL EVERY 6 HOURS PRN
Status: DISCONTINUED | OUTPATIENT
Start: 2020-01-01 | End: 2020-01-01 | Stop reason: HOSPADM

## 2020-01-01 RX ADMIN — DABIGATRAN ETEXILATE MESYLATE 150 MG: 150 CAPSULE ORAL at 05:29

## 2020-01-01 RX ADMIN — LINEZOLID 600 MG: 600 INJECTION, SOLUTION INTRAVENOUS at 06:38

## 2020-01-01 RX ADMIN — MORPHINE SULFATE 4 MG: 4 INJECTION INTRAVENOUS at 22:37

## 2020-01-01 RX ADMIN — TICAGRELOR 90 MG: 90 TABLET ORAL at 18:47

## 2020-01-01 RX ADMIN — REMDESIVIR 100 MG: 100 INJECTION, POWDER, LYOPHILIZED, FOR SOLUTION INTRAVENOUS at 17:30

## 2020-01-01 RX ADMIN — TIZANIDINE 4 MG: 4 TABLET ORAL at 03:59

## 2020-01-01 RX ADMIN — MINERAL OIL, PETROLATUM, PHENYLEPHRINE HCI: .25; 14; 74.9 OINTMENT TOPICAL at 20:21

## 2020-01-01 RX ADMIN — ACETAMINOPHEN 650 MG: 325 TABLET, FILM COATED ORAL at 19:53

## 2020-01-01 RX ADMIN — OMEPRAZOLE 20 MG: 20 CAPSULE, DELAYED RELEASE ORAL at 16:38

## 2020-01-01 RX ADMIN — ATORVASTATIN CALCIUM 80 MG: 80 TABLET, FILM COATED ORAL at 18:14

## 2020-01-01 RX ADMIN — ATORVASTATIN CALCIUM 80 MG: 80 TABLET, FILM COATED ORAL at 16:37

## 2020-01-01 RX ADMIN — CLONAZEPAM 0.5 MG: 0.5 TABLET ORAL at 17:40

## 2020-01-01 RX ADMIN — ALPRAZOLAM 0.5 MG: 0.5 TABLET ORAL at 05:29

## 2020-01-01 RX ADMIN — DABIGATRAN ETEXILATE MESYLATE 150 MG: 150 CAPSULE ORAL at 05:01

## 2020-01-01 RX ADMIN — DEXAMETHASONE 6 MG: 4 TABLET ORAL at 06:04

## 2020-01-01 RX ADMIN — TICAGRELOR 90 MG: 90 TABLET ORAL at 17:34

## 2020-01-01 RX ADMIN — ATORVASTATIN CALCIUM 80 MG: 80 TABLET, FILM COATED ORAL at 17:34

## 2020-01-01 RX ADMIN — MORPHINE SULFATE 2 MG: 4 INJECTION INTRAVENOUS at 12:49

## 2020-01-01 RX ADMIN — TIZANIDINE 4 MG: 4 TABLET ORAL at 17:03

## 2020-01-01 RX ADMIN — REMDESIVIR 200 MG: 100 INJECTION, POWDER, LYOPHILIZED, FOR SOLUTION INTRAVENOUS at 13:10

## 2020-01-01 RX ADMIN — AMOXICILLIN AND CLAVULANATE POTASSIUM 1 TABLET: 875; 125 TABLET, FILM COATED ORAL at 06:39

## 2020-01-01 RX ADMIN — DABIGATRAN ETEXILATE MESYLATE 150 MG: 150 CAPSULE ORAL at 16:59

## 2020-01-01 RX ADMIN — TICAGRELOR 90 MG: 90 TABLET ORAL at 18:00

## 2020-01-01 RX ADMIN — MINERAL OIL, PETROLATUM, PHENYLEPHRINE HCI: .25; 14; 74.9 OINTMENT TOPICAL at 21:02

## 2020-01-01 RX ADMIN — ACETAMINOPHEN 650 MG: 325 TABLET, FILM COATED ORAL at 04:38

## 2020-01-01 RX ADMIN — DABIGATRAN ETEXILATE MESYLATE 150 MG: 150 CAPSULE ORAL at 05:19

## 2020-01-01 RX ADMIN — HYDROCODONE BITARTRATE AND ACETAMINOPHEN 1 TABLET: 5; 325 TABLET ORAL at 22:58

## 2020-01-01 RX ADMIN — TICAGRELOR 90 MG: 90 TABLET ORAL at 05:26

## 2020-01-01 RX ADMIN — ANTACID TABLETS 1000 MG: 500 TABLET, CHEWABLE ORAL at 10:31

## 2020-01-01 RX ADMIN — ACETAMINOPHEN 650 MG: 325 TABLET, FILM COATED ORAL at 14:41

## 2020-01-01 RX ADMIN — LISINOPRIL 20 MG: 20 TABLET ORAL at 05:52

## 2020-01-01 RX ADMIN — Medication 5 MG: at 20:58

## 2020-01-01 RX ADMIN — ACETAMINOPHEN 650 MG: 325 TABLET, FILM COATED ORAL at 05:43

## 2020-01-01 RX ADMIN — ESCITALOPRAM OXALATE 20 MG: 10 TABLET ORAL at 04:46

## 2020-01-01 RX ADMIN — HYDROCODONE BITARTRATE AND ACETAMINOPHEN 2 TABLET: 5; 325 TABLET ORAL at 03:34

## 2020-01-01 RX ADMIN — TICAGRELOR 90 MG: 90 TABLET ORAL at 17:45

## 2020-01-01 RX ADMIN — HYDROCODONE BITARTRATE AND ACETAMINOPHEN 2 TABLET: 5; 325 TABLET ORAL at 03:19

## 2020-01-01 RX ADMIN — KETOROLAC TROMETHAMINE 15 MG: 30 INJECTION, SOLUTION INTRAMUSCULAR at 04:34

## 2020-01-01 RX ADMIN — DABIGATRAN ETEXILATE MESYLATE 150 MG: 150 CAPSULE ORAL at 17:45

## 2020-01-01 RX ADMIN — DABIGATRAN ETEXILATE MESYLATE 150 MG: 150 CAPSULE ORAL at 06:15

## 2020-01-01 RX ADMIN — TICAGRELOR 90 MG: 90 TABLET ORAL at 17:03

## 2020-01-01 RX ADMIN — DABIGATRAN ETEXILATE MESYLATE 150 MG: 150 CAPSULE ORAL at 17:31

## 2020-01-01 RX ADMIN — MORPHINE SULFATE 4 MG: 4 INJECTION INTRAVENOUS at 18:23

## 2020-01-01 RX ADMIN — HYDROCODONE BITARTRATE AND ACETAMINOPHEN 2 TABLET: 5; 325 TABLET ORAL at 17:40

## 2020-01-01 RX ADMIN — TICAGRELOR 90 MG: 90 TABLET ORAL at 06:17

## 2020-01-01 RX ADMIN — SODIUM CHLORIDE 250 ML: 9 INJECTION, SOLUTION INTRAVENOUS at 23:52

## 2020-01-01 RX ADMIN — ACETAMINOPHEN 650 MG: 325 TABLET, FILM COATED ORAL at 21:10

## 2020-01-01 RX ADMIN — MORPHINE SULFATE 2 MG: 4 INJECTION INTRAVENOUS at 21:50

## 2020-01-01 RX ADMIN — ACETAMINOPHEN 650 MG: 325 TABLET, FILM COATED ORAL at 11:23

## 2020-01-01 RX ADMIN — DEXAMETHASONE 6 MG: 4 TABLET ORAL at 05:12

## 2020-01-01 RX ADMIN — LISINOPRIL 20 MG: 20 TABLET ORAL at 05:19

## 2020-01-01 RX ADMIN — PIPERACILLIN AND TAZOBACTAM 3.38 G: 3; .375 INJECTION, POWDER, LYOPHILIZED, FOR SOLUTION INTRAVENOUS; PARENTERAL at 23:39

## 2020-01-01 RX ADMIN — LISINOPRIL 20 MG: 20 TABLET ORAL at 05:23

## 2020-01-01 RX ADMIN — DABIGATRAN ETEXILATE MESYLATE 150 MG: 150 CAPSULE ORAL at 05:52

## 2020-01-01 RX ADMIN — HYDROCODONE BITARTRATE AND ACETAMINOPHEN 2 TABLET: 5; 325 TABLET ORAL at 09:03

## 2020-01-01 RX ADMIN — TICAGRELOR 90 MG: 90 TABLET ORAL at 05:01

## 2020-01-01 RX ADMIN — FUROSEMIDE 20 MG: 10 INJECTION, SOLUTION INTRAMUSCULAR; INTRAVENOUS at 14:49

## 2020-01-01 RX ADMIN — LABETALOL HYDROCHLORIDE 10 MG: 5 INJECTION, SOLUTION INTRAVENOUS at 20:04

## 2020-01-01 RX ADMIN — PIPERACILLIN AND TAZOBACTAM 3.38 G: 3; .375 INJECTION, POWDER, LYOPHILIZED, FOR SOLUTION INTRAVENOUS; PARENTERAL at 01:37

## 2020-01-01 RX ADMIN — DABIGATRAN ETEXILATE MESYLATE 150 MG: 150 CAPSULE ORAL at 18:47

## 2020-01-01 RX ADMIN — IOHEXOL 80 ML: 350 INJECTION, SOLUTION INTRAVENOUS at 01:17

## 2020-01-01 RX ADMIN — DEXAMETHASONE 6 MG: 4 TABLET ORAL at 05:01

## 2020-01-01 RX ADMIN — HYDROCODONE BITARTRATE AND ACETAMINOPHEN 2 TABLET: 5; 325 TABLET ORAL at 20:21

## 2020-01-01 RX ADMIN — DABIGATRAN ETEXILATE MESYLATE 150 MG: 150 CAPSULE ORAL at 17:12

## 2020-01-01 RX ADMIN — DABIGATRAN ETEXILATE MESYLATE 150 MG: 150 CAPSULE ORAL at 05:23

## 2020-01-01 RX ADMIN — SODIUM CHLORIDE: 9 INJECTION, SOLUTION INTRAVENOUS at 08:57

## 2020-01-01 RX ADMIN — ACETAMINOPHEN 650 MG: 325 TABLET, FILM COATED ORAL at 03:33

## 2020-01-01 RX ADMIN — MORPHINE SULFATE 2 MG: 4 INJECTION INTRAVENOUS at 18:47

## 2020-01-01 RX ADMIN — HYDROCODONE BITARTRATE AND ACETAMINOPHEN 2 TABLET: 5; 325 TABLET ORAL at 15:23

## 2020-01-01 RX ADMIN — IOHEXOL 100 ML: 350 INJECTION, SOLUTION INTRAVENOUS at 13:25

## 2020-01-01 RX ADMIN — DABIGATRAN ETEXILATE MESYLATE 150 MG: 150 CAPSULE ORAL at 17:34

## 2020-01-01 RX ADMIN — REMDESIVIR 100 MG: 100 INJECTION, POWDER, LYOPHILIZED, FOR SOLUTION INTRAVENOUS at 16:59

## 2020-01-01 RX ADMIN — CLONAZEPAM 0.5 MG: 0.5 TABLET ORAL at 16:38

## 2020-01-01 RX ADMIN — MORPHINE SULFATE 2 MG: 4 INJECTION INTRAVENOUS at 10:19

## 2020-01-01 RX ADMIN — MORPHINE SULFATE 4 MG: 4 INJECTION INTRAVENOUS at 04:49

## 2020-01-01 RX ADMIN — MIRTAZAPINE 7.5 MG: 15 TABLET, FILM COATED ORAL at 20:25

## 2020-01-01 RX ADMIN — DABIGATRAN ETEXILATE MESYLATE 150 MG: 150 CAPSULE ORAL at 17:30

## 2020-01-01 RX ADMIN — MORPHINE SULFATE 2 MG: 4 INJECTION INTRAVENOUS at 01:48

## 2020-01-01 RX ADMIN — DEXAMETHASONE 6 MG: 4 TABLET ORAL at 04:46

## 2020-01-01 RX ADMIN — AMOXICILLIN AND CLAVULANATE POTASSIUM 1 TABLET: 875; 125 TABLET, FILM COATED ORAL at 17:34

## 2020-01-01 RX ADMIN — AMLODIPINE BESYLATE 10 MG: 10 TABLET ORAL at 05:52

## 2020-01-01 RX ADMIN — ATORVASTATIN CALCIUM 80 MG: 80 TABLET, FILM COATED ORAL at 18:47

## 2020-01-01 RX ADMIN — ACETAMINOPHEN 650 MG: 325 TABLET, FILM COATED ORAL at 08:50

## 2020-01-01 RX ADMIN — DABIGATRAN ETEXILATE MESYLATE 150 MG: 150 CAPSULE ORAL at 04:46

## 2020-01-01 RX ADMIN — OMEPRAZOLE 20 MG: 20 CAPSULE, DELAYED RELEASE ORAL at 17:45

## 2020-01-01 RX ADMIN — MORPHINE SULFATE 2 MG: 4 INJECTION INTRAVENOUS at 17:12

## 2020-01-01 RX ADMIN — LISINOPRIL 20 MG: 20 TABLET ORAL at 06:47

## 2020-01-01 RX ADMIN — MINERAL OIL, PETROLATUM, PHENYLEPHRINE HCI: .25; 14; 74.9 OINTMENT TOPICAL at 21:00

## 2020-01-01 RX ADMIN — DABIGATRAN ETEXILATE MESYLATE 150 MG: 150 CAPSULE ORAL at 17:43

## 2020-01-01 RX ADMIN — MORPHINE SULFATE 4 MG: 4 INJECTION INTRAVENOUS at 02:37

## 2020-01-01 RX ADMIN — ACETAMINOPHEN 650 MG: 325 TABLET, FILM COATED ORAL at 03:41

## 2020-01-01 RX ADMIN — CEFTRIAXONE SODIUM 1 G: 1 INJECTION, POWDER, FOR SOLUTION INTRAMUSCULAR; INTRAVENOUS at 17:11

## 2020-01-01 RX ADMIN — AMOXICILLIN AND CLAVULANATE POTASSIUM 1 TABLET: 875; 125 TABLET, FILM COATED ORAL at 17:03

## 2020-01-01 RX ADMIN — TICAGRELOR 90 MG: 90 TABLET ORAL at 05:21

## 2020-01-01 RX ADMIN — DABIGATRAN ETEXILATE MESYLATE 150 MG: 150 CAPSULE ORAL at 05:27

## 2020-01-01 RX ADMIN — PIPERACILLIN AND TAZOBACTAM 3.38 G: 3; .375 INJECTION, POWDER, LYOPHILIZED, FOR SOLUTION INTRAVENOUS; PARENTERAL at 09:35

## 2020-01-01 RX ADMIN — ATORVASTATIN CALCIUM 80 MG: 80 TABLET, FILM COATED ORAL at 20:22

## 2020-01-01 RX ADMIN — PIPERACILLIN AND TAZOBACTAM 3.38 G: 3; .375 INJECTION, POWDER, LYOPHILIZED, FOR SOLUTION INTRAVENOUS; PARENTERAL at 16:29

## 2020-01-01 RX ADMIN — ANTACID TABLETS 1000 MG: 500 TABLET, CHEWABLE ORAL at 08:12

## 2020-01-01 RX ADMIN — AMLODIPINE BESYLATE 10 MG: 10 TABLET ORAL at 06:47

## 2020-01-01 RX ADMIN — TICAGRELOR 90 MG: 90 TABLET ORAL at 05:11

## 2020-01-01 RX ADMIN — ACETAMINOPHEN 650 MG: 325 TABLET, FILM COATED ORAL at 22:03

## 2020-01-01 RX ADMIN — TICAGRELOR 90 MG: 90 TABLET ORAL at 17:43

## 2020-01-01 RX ADMIN — ATORVASTATIN CALCIUM 80 MG: 80 TABLET, FILM COATED ORAL at 17:32

## 2020-01-01 RX ADMIN — MORPHINE SULFATE 2 MG: 4 INJECTION INTRAVENOUS at 22:06

## 2020-01-01 RX ADMIN — ANTACID TABLETS 1000 MG: 500 TABLET, CHEWABLE ORAL at 09:46

## 2020-01-01 RX ADMIN — FUROSEMIDE 40 MG: 10 INJECTION, SOLUTION INTRAMUSCULAR; INTRAVENOUS at 21:28

## 2020-01-01 RX ADMIN — ACETAMINOPHEN 650 MG: 325 TABLET, FILM COATED ORAL at 04:46

## 2020-01-01 RX ADMIN — AMOXICILLIN AND CLAVULANATE POTASSIUM 1 TABLET: 875; 125 TABLET, FILM COATED ORAL at 17:31

## 2020-01-01 RX ADMIN — TICAGRELOR 90 MG: 90 TABLET ORAL at 17:30

## 2020-01-01 RX ADMIN — ATORVASTATIN CALCIUM 80 MG: 80 TABLET, FILM COATED ORAL at 17:30

## 2020-01-01 RX ADMIN — ACETAMINOPHEN 650 MG: 325 TABLET, FILM COATED ORAL at 16:22

## 2020-01-01 RX ADMIN — HYDROCODONE BITARTRATE AND ACETAMINOPHEN 1 TABLET: 5; 325 TABLET ORAL at 20:16

## 2020-01-01 RX ADMIN — CEFTRIAXONE SODIUM 1 G: 1 INJECTION, POWDER, FOR SOLUTION INTRAMUSCULAR; INTRAVENOUS at 05:23

## 2020-01-01 RX ADMIN — MINERAL OIL, PETROLATUM, PHENYLEPHRINE HCI: .25; 14; 74.9 OINTMENT TOPICAL at 20:51

## 2020-01-01 RX ADMIN — OMEPRAZOLE 20 MG: 20 CAPSULE, DELAYED RELEASE ORAL at 05:27

## 2020-01-01 RX ADMIN — MORPHINE SULFATE 4 MG: 4 INJECTION INTRAVENOUS at 00:25

## 2020-01-01 RX ADMIN — SODIUM CHLORIDE: 9 INJECTION, SOLUTION INTRAVENOUS at 05:27

## 2020-01-01 RX ADMIN — MORPHINE SULFATE 2 MG: 4 INJECTION INTRAVENOUS at 04:08

## 2020-01-01 RX ADMIN — FUROSEMIDE 20 MG: 10 INJECTION, SOLUTION INTRAMUSCULAR; INTRAVENOUS at 05:43

## 2020-01-01 RX ADMIN — HYDROCODONE BITARTRATE AND ACETAMINOPHEN 1 TABLET: 5; 325 TABLET ORAL at 11:16

## 2020-01-01 RX ADMIN — ATORVASTATIN CALCIUM 80 MG: 80 TABLET, FILM COATED ORAL at 16:59

## 2020-01-01 RX ADMIN — LABETALOL HYDROCHLORIDE 10 MG: 5 INJECTION, SOLUTION INTRAVENOUS at 21:02

## 2020-01-01 RX ADMIN — ATORVASTATIN CALCIUM 80 MG: 80 TABLET, FILM COATED ORAL at 17:43

## 2020-01-01 RX ADMIN — DABIGATRAN ETEXILATE MESYLATE 150 MG: 150 CAPSULE ORAL at 18:14

## 2020-01-01 RX ADMIN — MINERAL OIL, PETROLATUM, PHENYLEPHRINE HCI: .25; 14; 74.9 OINTMENT TOPICAL at 21:11

## 2020-01-01 RX ADMIN — MORPHINE SULFATE 2 MG: 4 INJECTION INTRAVENOUS at 17:42

## 2020-01-01 RX ADMIN — HYDROCODONE BITARTRATE AND ACETAMINOPHEN 1 TABLET: 5; 325 TABLET ORAL at 12:49

## 2020-01-01 RX ADMIN — TICAGRELOR 90 MG: 90 TABLET ORAL at 18:14

## 2020-01-01 RX ADMIN — TICAGRELOR 90 MG: 90 TABLET ORAL at 05:19

## 2020-01-01 RX ADMIN — MORPHINE SULFATE 2 MG: 4 INJECTION INTRAVENOUS at 12:18

## 2020-01-01 RX ADMIN — AMOXICILLIN AND CLAVULANATE POTASSIUM 1 TABLET: 875; 125 TABLET, FILM COATED ORAL at 05:52

## 2020-01-01 RX ADMIN — DABIGATRAN ETEXILATE MESYLATE 150 MG: 150 CAPSULE ORAL at 06:39

## 2020-01-01 RX ADMIN — OMEPRAZOLE 20 MG: 20 CAPSULE, DELAYED RELEASE ORAL at 04:46

## 2020-01-01 RX ADMIN — TICAGRELOR 90 MG: 90 TABLET ORAL at 16:38

## 2020-01-01 RX ADMIN — REMDESIVIR 100 MG: 100 INJECTION, POWDER, LYOPHILIZED, FOR SOLUTION INTRAVENOUS at 17:40

## 2020-01-01 RX ADMIN — METRONIDAZOLE 500 MG: 500 INJECTION, SOLUTION INTRAVENOUS at 06:03

## 2020-01-01 RX ADMIN — ACETAMINOPHEN 325 MG: 650 SUPPOSITORY RECTAL at 21:00

## 2020-01-01 RX ADMIN — TIZANIDINE 4 MG: 4 TABLET ORAL at 20:22

## 2020-01-01 RX ADMIN — TICAGRELOR 90 MG: 90 TABLET ORAL at 17:32

## 2020-01-01 RX ADMIN — MORPHINE SULFATE 4 MG: 4 INJECTION INTRAVENOUS at 15:39

## 2020-01-01 RX ADMIN — PIPERACILLIN AND TAZOBACTAM 3.38 G: 3; .375 INJECTION, POWDER, LYOPHILIZED, FOR SOLUTION INTRAVENOUS; PARENTERAL at 08:19

## 2020-01-01 RX ADMIN — DABIGATRAN ETEXILATE MESYLATE 150 MG: 150 CAPSULE ORAL at 16:37

## 2020-01-01 RX ADMIN — TICAGRELOR 90 MG: 90 TABLET ORAL at 06:39

## 2020-01-01 RX ADMIN — OMEPRAZOLE 20 MG: 20 CAPSULE, DELAYED RELEASE ORAL at 16:59

## 2020-01-01 RX ADMIN — TICAGRELOR 90 MG: 90 TABLET ORAL at 05:27

## 2020-01-01 RX ADMIN — MORPHINE SULFATE 4 MG: 4 INJECTION INTRAVENOUS at 07:44

## 2020-01-01 RX ADMIN — TICAGRELOR 90 MG: 90 TABLET ORAL at 05:52

## 2020-01-01 RX ADMIN — TIZANIDINE 4 MG: 4 TABLET ORAL at 11:30

## 2020-01-01 RX ADMIN — MORPHINE SULFATE 2 MG: 4 INJECTION INTRAVENOUS at 03:53

## 2020-01-01 RX ADMIN — AMLODIPINE BESYLATE 10 MG: 10 TABLET ORAL at 05:23

## 2020-01-01 RX ADMIN — MORPHINE SULFATE 2 MG: 4 INJECTION INTRAVENOUS at 12:53

## 2020-01-01 RX ADMIN — DABIGATRAN ETEXILATE MESYLATE 150 MG: 150 CAPSULE ORAL at 05:21

## 2020-01-01 RX ADMIN — OMEPRAZOLE 20 MG: 20 CAPSULE, DELAYED RELEASE ORAL at 05:12

## 2020-01-01 RX ADMIN — VANCOMYCIN HYDROCHLORIDE 1750 MG: 500 INJECTION, POWDER, LYOPHILIZED, FOR SOLUTION INTRAVENOUS at 18:26

## 2020-01-01 RX ADMIN — ASPIRIN 325 MG: 325 TABLET, FILM COATED ORAL at 06:10

## 2020-01-01 RX ADMIN — DABIGATRAN ETEXILATE MESYLATE 150 MG: 150 CAPSULE ORAL at 05:10

## 2020-01-01 RX ADMIN — MORPHINE SULFATE 4 MG: 4 INJECTION INTRAVENOUS at 14:07

## 2020-01-01 RX ADMIN — CLONAZEPAM 0.5 MG: 0.5 TABLET ORAL at 04:46

## 2020-01-01 RX ADMIN — DABIGATRAN ETEXILATE MESYLATE 150 MG: 150 CAPSULE ORAL at 17:03

## 2020-01-01 RX ADMIN — MORPHINE SULFATE 2 MG: 4 INJECTION INTRAVENOUS at 17:29

## 2020-01-01 RX ADMIN — IOHEXOL 25 ML: 240 INJECTION, SOLUTION INTRATHECAL; INTRAVASCULAR; INTRAVENOUS; ORAL at 13:25

## 2020-01-01 RX ADMIN — HYDROCODONE BITARTRATE AND ACETAMINOPHEN 2 TABLET: 5; 325 TABLET ORAL at 17:31

## 2020-01-01 RX ADMIN — AMLODIPINE BESYLATE 10 MG: 10 TABLET ORAL at 05:19

## 2020-01-01 RX ADMIN — OMEPRAZOLE 20 MG: 20 CAPSULE, DELAYED RELEASE ORAL at 17:30

## 2020-01-01 RX ADMIN — ATORVASTATIN CALCIUM 80 MG: 80 TABLET, FILM COATED ORAL at 17:03

## 2020-01-01 RX ADMIN — DABIGATRAN ETEXILATE MESYLATE 150 MG: 150 CAPSULE ORAL at 05:26

## 2020-01-01 RX ADMIN — PIPERACILLIN AND TAZOBACTAM 3.38 G: 3; .375 INJECTION, POWDER, LYOPHILIZED, FOR SOLUTION INTRAVENOUS; PARENTERAL at 15:32

## 2020-01-01 RX ADMIN — DEXAMETHASONE 6 MG: 4 TABLET ORAL at 05:27

## 2020-01-01 RX ADMIN — LORAZEPAM 1 MG: 2 INJECTION INTRAMUSCULAR; INTRAVENOUS at 16:26

## 2020-01-01 RX ADMIN — HYDROCODONE BITARTRATE AND ACETAMINOPHEN 2 TABLET: 5; 325 TABLET ORAL at 13:36

## 2020-01-01 RX ADMIN — HYDROCODONE BITARTRATE AND ACETAMINOPHEN 1 TABLET: 5; 325 TABLET ORAL at 00:46

## 2020-01-01 RX ADMIN — REMDESIVIR 100 MG: 100 INJECTION, POWDER, LYOPHILIZED, FOR SOLUTION INTRAVENOUS at 18:14

## 2020-01-01 RX ADMIN — LISINOPRIL 20 MG: 20 TABLET ORAL at 17:37

## 2020-01-01 RX ADMIN — HYDROCODONE BITARTRATE AND ACETAMINOPHEN 2 TABLET: 5; 325 TABLET ORAL at 08:23

## 2020-01-01 RX ADMIN — FUROSEMIDE 20 MG: 20 TABLET ORAL at 15:32

## 2020-01-01 RX ADMIN — ATORVASTATIN CALCIUM 80 MG: 80 TABLET, FILM COATED ORAL at 17:12

## 2020-01-01 RX ADMIN — OMEPRAZOLE 20 MG: 20 CAPSULE, DELAYED RELEASE ORAL at 17:40

## 2020-01-01 RX ADMIN — ESCITALOPRAM OXALATE 20 MG: 10 TABLET ORAL at 13:19

## 2020-01-01 RX ADMIN — PIPERACILLIN AND TAZOBACTAM 3.38 G: 3; .375 INJECTION, POWDER, LYOPHILIZED, FOR SOLUTION INTRAVENOUS; PARENTERAL at 05:26

## 2020-01-01 RX ADMIN — HYDROCODONE BITARTRATE AND ACETAMINOPHEN 1 TABLET: 5; 325 TABLET ORAL at 13:07

## 2020-01-01 RX ADMIN — OMEPRAZOLE 20 MG: 20 CAPSULE, DELAYED RELEASE ORAL at 05:29

## 2020-01-01 RX ADMIN — MIRTAZAPINE 7.5 MG: 15 TABLET, FILM COATED ORAL at 21:10

## 2020-01-01 RX ADMIN — AMOXICILLIN AND CLAVULANATE POTASSIUM 1 TABLET: 875; 125 TABLET, FILM COATED ORAL at 05:20

## 2020-01-01 RX ADMIN — TICAGRELOR 90 MG: 90 TABLET ORAL at 04:46

## 2020-01-01 RX ADMIN — HYDROCODONE BITARTRATE AND ACETAMINOPHEN 1 TABLET: 5; 325 TABLET ORAL at 09:06

## 2020-01-01 RX ADMIN — ATORVASTATIN CALCIUM 80 MG: 80 TABLET, FILM COATED ORAL at 17:40

## 2020-01-01 RX ADMIN — HYDROCODONE BITARTRATE AND ACETAMINOPHEN 1 TABLET: 5; 325 TABLET ORAL at 15:21

## 2020-01-01 RX ADMIN — OMEPRAZOLE 20 MG: 20 CAPSULE, DELAYED RELEASE ORAL at 18:15

## 2020-01-01 RX ADMIN — TICAGRELOR 90 MG: 90 TABLET ORAL at 05:29

## 2020-01-01 RX ADMIN — HYDROCODONE BITARTRATE AND ACETAMINOPHEN 1 TABLET: 5; 325 TABLET ORAL at 15:58

## 2020-01-01 RX ADMIN — FUROSEMIDE 20 MG: 20 TABLET ORAL at 12:30

## 2020-01-01 RX ADMIN — ACETAMINOPHEN 650 MG: 325 TABLET, FILM COATED ORAL at 10:32

## 2020-01-01 RX ADMIN — ACETAMINOPHEN 650 MG: 325 TABLET, FILM COATED ORAL at 23:05

## 2020-01-01 RX ADMIN — CEFTRIAXONE SODIUM 1 G: 1 INJECTION, POWDER, FOR SOLUTION INTRAMUSCULAR; INTRAVENOUS at 05:18

## 2020-01-01 RX ADMIN — TICAGRELOR 90 MG: 90 TABLET ORAL at 17:40

## 2020-01-01 RX ADMIN — AMLODIPINE BESYLATE 10 MG: 10 TABLET ORAL at 06:15

## 2020-01-01 RX ADMIN — LISINOPRIL 20 MG: 20 TABLET ORAL at 06:15

## 2020-01-01 RX ADMIN — HYDROCODONE BITARTRATE AND ACETAMINOPHEN 2 TABLET: 5; 325 TABLET ORAL at 21:04

## 2020-01-01 RX ADMIN — AMLODIPINE BESYLATE 10 MG: 10 TABLET ORAL at 17:37

## 2020-01-01 RX ADMIN — DABIGATRAN ETEXILATE MESYLATE 150 MG: 150 CAPSULE ORAL at 17:40

## 2020-01-01 RX ADMIN — FUROSEMIDE 20 MG: 20 TABLET ORAL at 05:29

## 2020-01-01 RX ADMIN — OMEPRAZOLE 20 MG: 20 CAPSULE, DELAYED RELEASE ORAL at 05:01

## 2020-01-01 RX ADMIN — DEXAMETHASONE 6 MG: 4 TABLET ORAL at 05:29

## 2020-01-01 RX ADMIN — MORPHINE SULFATE 4 MG: 4 INJECTION INTRAVENOUS at 17:08

## 2020-01-01 RX ADMIN — ACETAMINOPHEN 650 MG: 325 TABLET, FILM COATED ORAL at 10:07

## 2020-01-01 RX ADMIN — TICAGRELOR 90 MG: 90 TABLET ORAL at 16:59

## 2020-01-01 RX ADMIN — TICAGRELOR 90 MG: 90 TABLET ORAL at 05:23

## 2020-01-01 RX ADMIN — ATORVASTATIN CALCIUM 80 MG: 80 TABLET, FILM COATED ORAL at 17:45

## 2020-01-01 ASSESSMENT — ENCOUNTER SYMPTOMS
COUGH: 1
DIARRHEA: 0
PHOTOPHOBIA: 0
NAUSEA: 0
DIARRHEA: 0
PALPITATIONS: 0
DOUBLE VISION: 1
BLOOD IN STOOL: 0
BLURRED VISION: 1
WEAKNESS: 0
DIARRHEA: 0
HEADACHES: 0
VOMITING: 0
CONSTIPATION: 0
HEARTBURN: 0
SHORTNESS OF BREATH: 1
VOMITING: 0
EYE PAIN: 0
NAUSEA: 0
WEAKNESS: 0
EYE PAIN: 0
NECK PAIN: 0
ABDOMINAL PAIN: 1
FOCAL WEAKNESS: 0
EYES NEGATIVE: 1
COUGH: 1
FEVER: 0
SHORTNESS OF BREATH: 1
BLOOD IN STOOL: 0
MYALGIAS: 0
MYALGIAS: 0
DEPRESSION: 0
DIZZINESS: 0
FOCAL WEAKNESS: 0
FEVER: 1
BACK PAIN: 1
DIZZINESS: 0
FLANK PAIN: 0
ABDOMINAL PAIN: 1
CHILLS: 0
FEVER: 1
CONSTIPATION: 0
SORE THROAT: 0
DIARRHEA: 0
DIARRHEA: 0
VOMITING: 0
NAUSEA: 0
DIZZINESS: 0
SHORTNESS OF BREATH: 0
HEADACHES: 0
VOMITING: 0
HEADACHES: 0
FOCAL WEAKNESS: 0
SORE THROAT: 0
EYE PAIN: 0
SHORTNESS OF BREATH: 1
PALPITATIONS: 0
NAUSEA: 0
INSOMNIA: 1
EYES NEGATIVE: 1
PALPITATIONS: 0
FEVER: 0
MYALGIAS: 1
WEAKNESS: 0
DEPRESSION: 0
NEUROLOGICAL NEGATIVE: 1
DIARRHEA: 0
SHORTNESS OF BREATH: 0
CHILLS: 0
FOCAL WEAKNESS: 0
BLOOD IN STOOL: 0
ABDOMINAL PAIN: 1
DEPRESSION: 1
BACK PAIN: 1
MYALGIAS: 1
PHOTOPHOBIA: 0
MYALGIAS: 0
SORE THROAT: 0
BACK PAIN: 1
SHORTNESS OF BREATH: 0
WHEEZING: 0
EYE PAIN: 0
VOMITING: 0
SHORTNESS OF BREATH: 1
EYE PAIN: 0
FEVER: 0
EYE PAIN: 0
MYALGIAS: 1
FEVER: 0
PALPITATIONS: 0
SINUS PAIN: 0
BLOOD IN STOOL: 0
WEAKNESS: 0
DIARRHEA: 0
BACK PAIN: 1
STRIDOR: 0
DEPRESSION: 1
ABDOMINAL PAIN: 0
NAUSEA: 0
MYALGIAS: 0
DIARRHEA: 0
PALPITATIONS: 0
VOMITING: 0
NEUROLOGICAL NEGATIVE: 1
DIARRHEA: 0
MYALGIAS: 1
HEADACHES: 0
HEARTBURN: 0
ABDOMINAL PAIN: 1
INSOMNIA: 0
SEIZURES: 0
CONSTIPATION: 0
BLURRED VISION: 0
ABDOMINAL PAIN: 1
HEADACHES: 0
CONSTIPATION: 0
VOMITING: 0
PALPITATIONS: 0
HEADACHES: 0
PHOTOPHOBIA: 0
MEMORY LOSS: 0
BLURRED VISION: 0
MUSCULOSKELETAL NEGATIVE: 1
BLURRED VISION: 0
FEVER: 0
BACK PAIN: 0
PALPITATIONS: 0
NAUSEA: 0
ABDOMINAL PAIN: 1
DIZZINESS: 0
BLURRED VISION: 0
MYALGIAS: 1
VOMITING: 0
COUGH: 1
NAUSEA: 0
HEADACHES: 0
FEVER: 0
VOMITING: 0
CHILLS: 0
ABDOMINAL PAIN: 0
ABDOMINAL PAIN: 0
MYALGIAS: 0
SHORTNESS OF BREATH: 0
MYALGIAS: 0
FEVER: 1
EYES NEGATIVE: 1
FEVER: 1
DIZZINESS: 0
SHORTNESS OF BREATH: 0
BLOOD IN STOOL: 0
PHOTOPHOBIA: 0
NAUSEA: 0
FEVER: 1
EYE DISCHARGE: 0
EYES NEGATIVE: 1
PALPITATIONS: 0
SORE THROAT: 0
NEUROLOGICAL NEGATIVE: 1
CHILLS: 1
VOMITING: 0
WEAKNESS: 0
DEPRESSION: 0
DIARRHEA: 0
ABDOMINAL PAIN: 0
VOMITING: 0
FOCAL WEAKNESS: 1
HEADACHES: 0
ABDOMINAL PAIN: 1
CHILLS: 1
NAUSEA: 0
SHORTNESS OF BREATH: 1
NAUSEA: 0
DIZZINESS: 0
CHILLS: 0
NERVOUS/ANXIOUS: 1
CHILLS: 1
WEAKNESS: 0
PALPITATIONS: 0
SHORTNESS OF BREATH: 0
DEPRESSION: 0
HEADACHES: 0
COUGH: 1
EYES NEGATIVE: 1
CHILLS: 1
NERVOUS/ANXIOUS: 1
NEUROLOGICAL NEGATIVE: 1
PALPITATIONS: 0
VOMITING: 0
DIARRHEA: 0
CHILLS: 1
NAUSEA: 0
ABDOMINAL PAIN: 1
VOMITING: 0
PHOTOPHOBIA: 0
COUGH: 0
PALPITATIONS: 0
MYALGIAS: 1
EYES NEGATIVE: 1
HEARTBURN: 0
DIARRHEA: 0
ABDOMINAL PAIN: 1
EYE REDNESS: 0
BLOOD IN STOOL: 0
DIZZINESS: 0
SHORTNESS OF BREATH: 1
DIAPHORESIS: 0
NEUROLOGICAL NEGATIVE: 1
BACK PAIN: 1
DIARRHEA: 0
FEVER: 0
DEPRESSION: 0
COUGH: 1
PHOTOPHOBIA: 0
HEADACHES: 0
MYALGIAS: 1
NERVOUS/ANXIOUS: 0
MYALGIAS: 1
COUGH: 1
ABDOMINAL PAIN: 0
CONSTIPATION: 0
FEVER: 1
BLOOD IN STOOL: 0
CARDIOVASCULAR NEGATIVE: 1
NAUSEA: 0
DIZZINESS: 0
NAUSEA: 0
DEPRESSION: 1
EYES NEGATIVE: 1
FOCAL WEAKNESS: 0
BACK PAIN: 1
SPUTUM PRODUCTION: 0
DIZZINESS: 0
FEVER: 1
FEVER: 1
DIZZINESS: 0
NERVOUS/ANXIOUS: 1
SORE THROAT: 0
DIZZINESS: 0
VOMITING: 0
PHOTOPHOBIA: 0
CHILLS: 0
WEAKNESS: 1
SORE THROAT: 0
HEARTBURN: 0
FOCAL WEAKNESS: 0
NEUROLOGICAL NEGATIVE: 1
HEADACHES: 0
WEAKNESS: 0
BLOOD IN STOOL: 0
NERVOUS/ANXIOUS: 1
NAUSEA: 0
PALPITATIONS: 0
SHORTNESS OF BREATH: 0
PHOTOPHOBIA: 0
CHILLS: 1
SHORTNESS OF BREATH: 1
EYES NEGATIVE: 1
WEAKNESS: 1
EYE PAIN: 0
HEADACHES: 0
DEPRESSION: 0
BACK PAIN: 1
PALPITATIONS: 0
NEUROLOGICAL NEGATIVE: 1
SHORTNESS OF BREATH: 1
DEPRESSION: 0
SHORTNESS OF BREATH: 1
PALPITATIONS: 0
CHILLS: 1
VOMITING: 0
HEARTBURN: 0
BLURRED VISION: 0
DIARRHEA: 0
TINGLING: 0
SHORTNESS OF BREATH: 0
DEPRESSION: 1
SORE THROAT: 0
GASTROINTESTINAL NEGATIVE: 1
NAUSEA: 0
HEADACHES: 0
DIZZINESS: 0
VOMITING: 0
POLYDIPSIA: 0
HEARTBURN: 0
NEUROLOGICAL NEGATIVE: 1
CHILLS: 0
EYES NEGATIVE: 1
BACK PAIN: 1
ABDOMINAL PAIN: 0
HEADACHES: 0
NAUSEA: 0
DIZZINESS: 0
DIZZINESS: 0
COUGH: 1
DIARRHEA: 1
CHILLS: 1
DIARRHEA: 0
MYALGIAS: 0
DIZZINESS: 0
EYE PAIN: 0
DEPRESSION: 1
BLURRED VISION: 0
WEIGHT LOSS: 1
COUGH: 1
PALPITATIONS: 0
EYE PAIN: 0
CHILLS: 0
HEADACHES: 0
BRUISES/BLEEDS EASILY: 0
CONSTIPATION: 0
FOCAL WEAKNESS: 0

## 2020-01-01 ASSESSMENT — COGNITIVE AND FUNCTIONAL STATUS - GENERAL
TOILETING: A LITTLE
STANDING UP FROM CHAIR USING ARMS: A LITTLE
WALKING IN HOSPITAL ROOM: A LITTLE
MOBILITY SCORE: 21
DAILY ACTIVITIY SCORE: 24
SUGGESTED CMS G CODE MODIFIER MOBILITY: CJ
SUGGESTED CMS G CODE MODIFIER DAILY ACTIVITY: CJ
SUGGESTED CMS G CODE MODIFIER DAILY ACTIVITY: CH
HELP NEEDED FOR BATHING: A LITTLE
CLIMB 3 TO 5 STEPS WITH RAILING: A LITTLE
DAILY ACTIVITIY SCORE: 21
DRESSING REGULAR LOWER BODY CLOTHING: A LITTLE

## 2020-01-01 ASSESSMENT — FIBROSIS 4 INDEX
FIB4 SCORE: 1.97
FIB4 SCORE: 1.75
FIB4 SCORE: 2.64

## 2020-01-01 ASSESSMENT — PAIN SCALES - WONG BAKER
WONGBAKER_NUMERICALRESPONSE: DOESN'T HURT AT ALL
WONGBAKER_NUMERICALRESPONSE: DOESN'T HURT AT ALL

## 2020-01-01 ASSESSMENT — PAIN DESCRIPTION - PAIN TYPE
TYPE: ACUTE PAIN
TYPE: CHRONIC PAIN
TYPE: ACUTE PAIN
TYPE: CHRONIC PAIN
TYPE: ACUTE PAIN
TYPE: CHRONIC PAIN
TYPE: CHRONIC PAIN
TYPE: ACUTE PAIN
TYPE: ACUTE PAIN
TYPE: CHRONIC PAIN
TYPE: ACUTE PAIN
TYPE: ACUTE PAIN
TYPE: CHRONIC PAIN
TYPE: CHRONIC PAIN
TYPE: ACUTE PAIN
TYPE: CHRONIC PAIN
TYPE: CHRONIC PAIN
TYPE: ACUTE PAIN
TYPE: CHRONIC PAIN
TYPE: ACUTE PAIN

## 2020-01-01 ASSESSMENT — CHA2DS2 SCORE
AGE 65 TO 74: YES
HYPERTENSION: YES
PRIOR STROKE OR TIA OR THROMBOEMBOLISM: YES
CHA2DS2 VASC SCORE: 6
AGE 75 OR GREATER: NO
CHF OR LEFT VENTRICULAR DYSFUNCTION: NO
SEX: MALE
DIABETES: YES
VASCULAR DISEASE: YES

## 2020-01-01 ASSESSMENT — GAIT ASSESSMENTS
DISTANCE (FEET): 50
DEVIATION: BRADYKINETIC;SHUFFLED GAIT;DECREASED HEEL STRIKE;DECREASED TOE OFF
ASSISTIVE DEVICE: FRONT WHEEL WALKER

## 2020-01-01 ASSESSMENT — ACTIVITIES OF DAILY LIVING (ADL): TOILETING: INDEPENDENT

## 2020-11-22 ENCOUNTER — HOSPITAL ENCOUNTER (EMERGENCY)
Dept: HOSPITAL 8 - ED | Age: 65
Discharge: HOME | End: 2020-11-22
Payer: MEDICARE

## 2020-11-22 VITALS — SYSTOLIC BLOOD PRESSURE: 166 MMHG | DIASTOLIC BLOOD PRESSURE: 93 MMHG

## 2020-11-22 VITALS — HEIGHT: 66 IN | WEIGHT: 171.96 LBS | BODY MASS INDEX: 27.64 KG/M2

## 2020-11-22 DIAGNOSIS — R07.9: Primary | ICD-10-CM

## 2020-11-22 DIAGNOSIS — I25.2: ICD-10-CM

## 2020-11-22 DIAGNOSIS — E11.9: ICD-10-CM

## 2020-11-22 DIAGNOSIS — I11.0: ICD-10-CM

## 2020-11-22 DIAGNOSIS — I50.9: ICD-10-CM

## 2020-11-22 DIAGNOSIS — R94.31: ICD-10-CM

## 2020-11-22 DIAGNOSIS — I25.10: ICD-10-CM

## 2020-11-22 DIAGNOSIS — Z86.73: ICD-10-CM

## 2020-11-22 DIAGNOSIS — Z95.5: ICD-10-CM

## 2020-11-22 LAB — TROPONIN I SERPL-MCNC: < 0.015 NG/ML (ref 0–0.04)

## 2020-11-22 PROCEDURE — 84484 ASSAY OF TROPONIN QUANT: CPT

## 2020-11-22 PROCEDURE — 36415 COLL VENOUS BLD VENIPUNCTURE: CPT

## 2020-11-22 PROCEDURE — 93005 ELECTROCARDIOGRAM TRACING: CPT

## 2020-11-22 PROCEDURE — 99284 EMERGENCY DEPT VISIT MOD MDM: CPT

## 2020-11-22 NOTE — NUR
ASSUMED CARE OF PT. RECEIVED REPORT FROM YADIEL COYLE. PATIENT COMPLAINS OF 
CHEST PAIN/PRESSURE STARTING AT 0400 TODAY WHICH WAS RELIEVED WITH NITRO. NO 
CHEST PAIN AT THIS TIME. DISCUSSED PLAN OF CARE. NO FAMILY AT BEDSIDE. CALL 
LIGHT WITHIN REACH. PT ON CONTINUOUS CARDIAC MONIOR.

## 2020-12-14 NOTE — PROGRESS NOTES
Medical history of alcohol abuse, atrial fibrillation, systolic heart failure, severe MR, presents to the [facility] ER for ataxia, aphasia and left arm weakness that started 2 days prior.  Patient does have a history of prior stroke.  CT scan today found subacute right temporal and occipital strokes.  NIHSS score was 6.  The facility did not get an MRI of the brain, cardiac echo or a CTA.  Patient is alert and oriented and vital signs are stable.  Patient be transferred to a neuro telemetry bed.  He is Covid positive.

## 2020-12-15 PROBLEM — I25.10 CORONARY ARTERY DISEASE INVOLVING NATIVE CORONARY ARTERY OF NATIVE HEART: Status: ACTIVE | Noted: 2020-01-01

## 2020-12-15 PROBLEM — U07.1 COVID-19 VIRUS INFECTION: Status: ACTIVE | Noted: 2020-01-01

## 2020-12-15 NOTE — PROGRESS NOTES
"Pt is verbally aggressive to staff, wants to leave AMA. Swung at RN when attempting to take his monitor off. Refusing to let staff remove his IV, says he \"likes them.\" When trying to calm him down he says he jut wants to go to a bar. Calling all staff \"assholes\" very loudly. Manager at bedside.    "

## 2020-12-15 NOTE — PROGRESS NOTES
[facility], assumed care of pt. Call light within reach, bed locked and in lowest position. All fall precautions and hourly rounding in place. Will continue to monitor.

## 2020-12-15 NOTE — DISCHARGE PLANNING
Anticipated Discharge Disposition: TBD    Action: LSW spoke with Jason,  form [facility] to tell me that Pt has been working with  there to possibly go to a rehab facility in [place] for 's recovering from strokes. Pt had been reticent according to Jason. She would like us to talk to him about this option for discharge as well. According to Jason pt had told them at his last discharge this weekend that he may not be able to go back to his home now. She isn't sure if he had a disagreement with his roommate or another issue.     The facility is [facility]. [phone]    LSW attempted to call Pt but his room line was busy.     Barriers to Discharge: Medical Clearance    Plan: LSW to continue coordinating with Pt and medical team for discharge planning.

## 2020-12-15 NOTE — THERAPY
Missed Therapy     Patient Name: Emeterio Christianson  Age:  65 y.o., Sex:  male  Medical Record #: [id number]  Today's Date: 12/15/2020    Discussed missed therapy with RN       12/15/20 1045   Treatment Variance   Reason For Missed Therapy Medical - Patient Agitated   Total Time Spent   Total Time Spent (Mins) 2   Interdisciplinary Plan of Care Collaboration   IDT Collaboration with  Nursing   Collaboration Comments This SLP attempted to see patient for clinical swallow evaluation. Per RN, evaluation not warranted, as patient appears to be tolerating regular diet without difficulty. RN reported patient is also combative and agitated this morning. Will hold cog eval. SLP will re-attempt as able and appropriate.

## 2020-12-15 NOTE — PROGRESS NOTES
Report received. Patient oriented x4. Left upper and lower leg weakness, right hip pain per patient. Mild left lower facial droop. Speech clear. Denies SOB. Fall risk interventions in place, bed in low position, and bed alarm on. Assessment completed.

## 2020-12-15 NOTE — CONSULTS
"Cardiology Initial Consultation    Date of Service  12/15/2020    Referring Physician  Clementina Beauchamp M.D.    Reason for Consultation  Management of antiplatelet therapy    History of Presenting Illness  Emeterio Christianson is a 65 y.o. male with known coronary artery disease status post PCI who presented 12/14/2020 with left-sided weakness.  Patient is a poor historian.  He reports having 4 stents to his \"right side\" in October but does not recall any of his medications.  He believes that he is only on aspirin.  He cannot provide his history prior to his PCI.  Currently reports being chest pain-free but has some chest discomfort last night.  Denies any dyspnea.  Reports having a history of 2 strokes since his PCI and one in 1991.  Reports that he has never been on anticoagulation.    Further history could not be obtained as the patient is a poor historian.    Review of Systems  Review of Systems   Unable to perform ROS: Other   Patient is a poor historian.    The following past medical, past surgical, family and social history is based on chart review.    Past Medical History   has a past medical history of A-fib (HCC), A-fib (HCC) (09/17/2020), Backpain, CAD (coronary artery disease), Congestive heart failure (HCC) (10/08/2020), Diabetes, Fall, Headache, HTN (hypertension), Hypertension, Myocardial infarct (HCC), Neck pain, and Stroke (HCC).    Surgical History   has a past surgical history that includes other orthopedic surgery; colonoscopy; and fusion.    Family History  family history is not on file.    Social History   reports that he has never smoked. He does not have any smokeless tobacco history on file. He reports that he does not drink alcohol or use drugs.    Home Medications   Prior to Admission Medications   Prescriptions Last Dose Informant Patient Reported? Taking?   MULTIPLE VITAMINS-MINERALS PO 12/14/2020 at 0800  Yes No   Sig: Take 1 Tab by mouth every day with lunch.   acetaminophen (TYLENOL) 500 MG " Tab   Yes Yes   Sig: Take 500 mg by mouth 4 times a day as needed.   amlodipine (NORVASC) 10 MG TABS 12/14/2020 at 0800  No No   Sig: Take 1 Tab by mouth every day.   aspirin (ASA) 325 MG TABS 12/14/2020 at 0800  No No   Sig: Take 1 Tab by mouth every day.   hydrOXYzine HCl (ATARAX) 25 MG Tab   Yes Yes   Sig: Take 25 mg by mouth every bedtime.   hydrochlorothiazide (HYDRODIURIL) 25 MG TABS 12/14/2020 at 0800  No No   Sig: Take 1 Tab by mouth every day.   hydrocodone-acetaminophen (NORCO) 5-325 MG TABS per tablet   No No   Sig: Take 1-2 Tabs by mouth every four hours as needed for Mild Pain.   lisinopril (PRINIVIL) 20 MG TABS 12/14/2020 at 0800  No No   Sig: Take 1 Tab by mouth 2 Times a Day.   prasugrel (EFFIENT) 10 MG Tab 12/14/2020 at 0800  Yes Yes   Sig: Take 10 mg by mouth every day.   simvastatin (ZOCOR) 40 MG TABS 12/13/2020 at 0900  No No   Sig: Take 1 Tab by mouth every evening.   spironolactone (ALDACTONE) 25 MG Tab 12/14/2020 at 0800  Yes Yes   Sig: Take 25 mg by mouth every day.   tizanidine (ZANAFLEX) 4 MG Tab 12/14/2020 at 0800  Yes Yes   Sig: Take 4 mg by mouth 3 times a day.   topiramate (TOPAMAX) 25 MG Tab 12/13/2020 at 0800  Yes Yes   Sig: Take 25 mg by mouth every day.      Facility-Administered Medications: None       Inpatient Medications  • Pharmacy   PHARMACY TO DOSE   • aspirin  325 mg DAILY    Or   • aspirin  324 mg DAILY    Or   • aspirin  300 mg DAILY   • atorvastatin  80 mg Q EVENING   • tizanidine  4 mg Q6HRS PRN   • acetaminophen  650 mg Q4HRS PRN       Allergies  No Known Allergies    Vital signs in last 24 hours  Temp:  [36.5 °C (97.7 °F)-38.6 °C (101.4 °F)] 36.5 °C (97.7 °F)  Pulse:  [] 85  Resp:  [16-23] 20  BP: ()/(60-95) 129/71  SpO2:  [94 %-98 %] 95 %    Physical Exam  Physical Exam   Constitutional: He is oriented to person, place, and time and well-developed, well-nourished, and in no distress. No distress.   HENT:   Head: Normocephalic and atraumatic.   Eyes:  Conjunctivae are normal.   Neck: Neck supple.   Cardiovascular: Normal rate and regular rhythm.   Pulmonary/Chest: Effort normal. No respiratory distress.   Abdominal: He exhibits no distension.   Musculoskeletal:         General: No edema.   Neurological: He is alert and oriented to person, place, and time.   Skin: Skin is warm and dry. He is not diaphoretic.   Psychiatric: Mood and affect normal.   Poor historian   Nursing note and vitals reviewed.    Lab Review  Recent Labs     12/15/20  0423   WBC 9.6   RBC 4.31*   HEMOGLOBIN 13.6*   HEMATOCRIT 40.8*   PLATELETCT 131*   MCV 94.7   MPV 9.8     Recent Labs     12/14/20 1958   SODIUM 136   POTASSIUM 4.3   CHLORIDE 106   CO2 17*   GLUCOSE 89   BUN 19   CREATININE 1.09      No results found for: TROPONINI, TROPONINT, CKMB    Lab Results   Component Value Date/Time    ASTSGOT 19 12/14/2020 07:58 PM    ALTSGPT 23 12/14/2020 07:58 PM     Lab Results   Component Value Date/Time    CHOLSTRLTOT 105 12/15/2020 04:23 AM    LDL 48 12/15/2020 04:23 AM    HDL 35 (A) 12/15/2020 04:23 AM    TRIGLYCERIDE 110 12/15/2020 04:23 AM       Labs reviewed as noted above. Normal creatinine.     Cardiac Imaging and Procedures Review  EKG performed yesterday at 2059 hours was personally reviewed and per my interpretation shows sinus rhythm with PVCs.    Echocardiogram performed today was personally reviewed and per my interpretation shows normal LV systolic function.     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    [facility].  Patient recently moved from [place].  In August 2020, he had a CVA at [facility] and was reportedly started on Eliquis.  In October 2020 he had 4 stents to his RCA at [facility] following which he was started on aspirin  "and Plavix.  He is followed by Dr. Betancourt at [facility] as well.  At some point, his Eliquis was changed to Pradaxa.    No clear note in the patient's chart as to the reason for anticoagulation.  A note by JOSEP Maravilla reports that he is on Pradaxa and also lists his history of \"thrombotic CVA\".  The same ARGELIA lists atrial fibrillation in his problem list as of September 2020 however atrial fibrillation is not mentioned in any of the notes that were reviewed.    2 weeks ago, [facility] however he also mentioned to the PCP that he was not not supposed to be on Pradaxa.  Due to the confusing history, PCP did not refill his Pradaxa and asked him to follow-up with Dr. Betancourt.  Another note mentions that he was only on Pradaxa once a day even though he was supposed to take it twice daily.      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  Given his history of recurrent CVA and this possible history of atrial fibrillation, he will be restarted on Pradaxa 150 mg twice daily.    He was on prasugrel which is contraindicated in patients with a prior CVA.  Given his recent PCI with 4 stents, will transition him to Brilinta instead.  He is more than 30 days out from his PCI, therefore discontinue aspirin.    He is high risk for bleeding with his Pradaxa and Brilinta.  Will defer further management of his antiplatelet therapy to his outpatient cardiologist, Dr. Betancourt.    Will sign off. Please call with any questions. Thank you.     36 minutes of non-face-to-face time were spent discussing the case with primary team Dr. Beauchamp and IM resident along with chart review as noted above.  Start and stop time: 3:30 PM to 3:46 PM, 4:20 PM to 4:40 PM.    Brittany eLe MD, [facility]  Cardiologist  [facility]          "

## 2020-12-15 NOTE — PROGRESS NOTES
Notified md of b/p on left 87/60 and 85/60, patient awake talking, patient complained of left chest pain increased with inspiration. ekg negative. Mild diaphoresis patient did have fever earlier and was given medication for it, fever now subsided. Patient still complains of hip pain. Per md give IS and no intervention for b/p at this time.

## 2020-12-15 NOTE — ASSESSMENT & PLAN NOTE
Likely embolic in etiology.  He was not taking his antiplatelet and anticoagulant PTA.  Neurology saw and signed off.  He was back to his baseline and all appropriate medications were restarted and [facility].    Effient changed to Brilinta.  Pradaxa restarted during this admission.   Now with Comfort Care status started, will stop these meds

## 2020-12-15 NOTE — DISCHARGE PLANNING
TCC  Rehab Impairment Code: 0001.1 - Stroke: Left Body Involvement (Right Brain)  -Pending therapy eval, but patient with NIHSS of 0 on 12/15 per neurology and he wants to go home  -Also not a candidate for inpatient rehab at this time due to +Covid (will need 2 neg covid tests or >20 days since first positive test)  -Anticipate patient will discharge home with  when medically cleared     Note indicates patient was evaluated at the [facility] at because he had a positive COVID-19 test he required transfer to our facility as they did not have any more Covid beds.     TCC will follow

## 2020-12-15 NOTE — DISCHARGE PLANNING
[facility] Rehabilitation Transitional Care Coordination     Referral from:  Dr. Delaney    Facesheet indicates: [facility].  Unfortunately, [facility] Acute Rehab is not a benefit.      Potential Rehab Diagnosis: CVA?    Chart review indicates patient may have on going medical management and may have therapy needs to possibly meet inpatient rehab facility criteria with the goal of returning to community.    D/C support: TBD     Physiatry consultation forwarded per protocol.      CVA?  W/U & TX pending.  Forwarded to Physiatry to consult.  Waiting on additional information to determine appropriateness for acute inpatient rehabilitation. Will continue to follow.     Last Covid test date: TBD    Thank you for the referral.     Addendum:  [facility] is now showing and is primary per PAR [id number].

## 2020-12-15 NOTE — CONSULTS
Physical Medicine and Rehabilitation Consultation         Initial Consult      Initial Consultation Date: 12/15/2020  Consulting provider: Dr. Delaney   Reason for consultation: assess for acute inpatient rehab appropriateness  LOS: 1 Day(s)      Chief complaint: left sided weakness      HPI:   The patient is a 65 y.o. male with a past medical history of CAD s/p stent (10/2020) on prasugrel and asa, right eye blindness, AFib (supposed to be on AC, but not on it), hx of alcohol abuse, severe MR, HL, HTN;  who presented on 12/14/2020  5:50 PM with sudden onset left-sided weakness on 12/12 and fell over to side of the bed. [facility], found to have +Covid, and then transferred to [facility] due to not having any covid beds. CT head showed right temporal and occipital subacute strokes. Outside TPA and thrombectomy window.        Social Hx:  Info pending       Current level of function:   Therapy eval pending         PMH:  Past Medical History:   Diagnosis Date   • A-fib (HCC)    • A-fib (HCC) 09/17/2020   • Backpain    • CAD (coronary artery disease)    • Congestive heart failure (HCC) 10/08/2020   • Diabetes    • Fall    • Headache    • HTN (hypertension)    • Hypertension    • Myocardial infarct (HCC)     per pt 4 stents    • Neck pain    • Stroke (HCC)          PSH:  Past Surgical History:   Procedure Laterality Date   • COLONOSCOPY     • FUSION      lumbar   • OTHER ORTHOPEDIC SURGERY           FHX: Reviewed.  No family history on file.              Medications:  Current Facility-Administered Medications   Medication Dose   • Pharmacy consult request - Allow for permissive hypertension: SBP up to 220 mmHg/DBP up to 120 mmHg x 48 hours     • aspirin (ASA) tablet 325 mg  325 mg    Or   • aspirin (ASA) chewable tab 324 mg  324 mg    Or   • aspirin (ASA) suppository 300 mg  300 mg   • atorvastatin (LIPITOR) tablet 80 mg  80 mg   • tizanidine (ZANAFLEX) tablet 4 mg  4 mg   • acetaminophen  "(Tylenol) tablet 650 mg  650 mg       Allergies:  No Known Allergies      Vitals: /71   Pulse 85   Temp 36.5 °C (97.7 °F) (Temporal)   Resp 20   Ht 1.676 m (5' 6\")   Wt 70.4 kg (155 lb 3.3 oz)   SpO2 95%           Labs: Reviewed and significant for 12/15: Hgb 13.6, Na 136, K 4.3, Cr 1.09  Recent Labs     12/15/20  0423   RBC 4.31*   HEMOGLOBIN 13.6*   HEMATOCRIT 40.8*   PLATELETCT 131*     Recent Labs     20   SODIUM 136   POTASSIUM 4.3   CHLORIDE 106   CO2 17*   GLUCOSE 89   BUN 19   CREATININE 1.09   CALCIUM 9.7     Recent Results (from the past 24 hour(s))   Comp Metabolic Panel    Collection Time: 20  7:58 PM   Result Value Ref Range    Sodium 136 135 - 145 mmol/L    Potassium 4.3 3.6 - 5.5 mmol/L    Chloride 106 96 - 112 mmol/L    Co2 17 (L) 20 - 33 mmol/L    Anion Gap 13.0 7.0 - 16.0    Glucose 89 65 - 99 mg/dL    Bun 19 8 - 22 mg/dL    Creatinine 1.09 0.50 - 1.40 mg/dL    Calcium 9.7 8.5 - 10.5 mg/dL    AST(SGOT) 19 12 - 45 U/L    ALT(SGPT) 23 2 - 50 U/L    Alkaline Phosphatase 84 30 - 99 U/L    Total Bilirubin 0.4 0.1 - 1.5 mg/dL    Albumin 4.7 3.2 - 4.9 g/dL    Total Protein 7.5 6.0 - 8.2 g/dL    Globulin 2.8 1.9 - 3.5 g/dL    A-G Ratio 1.7 g/dL   ESTIMATED GFR    Collection Time: 20  7:58 PM   Result Value Ref Range    GFR If African American >60 >60 mL/min/1.73 m 2    GFR If Non African American >60 >60 mL/min/1.73 m 2   EKG    Collection Time: 20  8:59 PM   Result Value Ref Range    Report       [facility]    Test Date:  2020  Pt Name:    HA CEJA               Department: 171  MRN:        [id number]                      Room:       T728  Gender:     Male                         Technician: MAHNAZ  :        1955                   Requested By:NUPUR RITCHIE  Order #:    [id number]                    Reading MD: Tanner Maurice    Measurements  Intervals                                Axis  Rate:       93                           P:          " 8  MD:         176                          QRS:        103  QRSD:       92                           T:          39  QT:         360  QTc:        448    Interpretive Statements  SINUS RHYTHM  PAIRED VENTRICULAR PREMATURE COMPLEXES  RIGHT AXIS DEVIATION  Compared to ECG 02/11/2011 17:57:25  Ventricular premature complex(es) now present  Right-axis deviation now present  Prolonged QT interval no longer present  Myocardial infarct finding no longer present  Electronically Signed On 12- 23:45:35 PST by Tanner Richards     Lipid Profile    Collection Time: 12/15/20  4:23 AM   Result Value Ref Range    Cholesterol,Tot 105 100 - 199 mg/dL    Triglycerides 110 0 - 149 mg/dL    HDL 35 (A) >=40 mg/dL    LDL 48 <100 mg/dL   Hemoglobin A1C    Collection Time: 12/15/20  4:23 AM   Result Value Ref Range    Glycohemoglobin 5.5 0.0 - 5.6 %    Est Avg Glucose 111 mg/dL   CBC WITH DIFFERENTIAL    Collection Time: 12/15/20  4:23 AM   Result Value Ref Range    WBC 9.6 4.8 - 10.8 K/uL    RBC 4.31 (L) 4.70 - 6.10 M/uL    Hemoglobin 13.6 (L) 14.0 - 18.0 g/dL    Hematocrit 40.8 (L) 42.0 - 52.0 %    MCV 94.7 81.4 - 97.8 fL    MCH 31.6 27.0 - 33.0 pg    MCHC 33.3 (L) 33.7 - 35.3 g/dL    RDW 45.6 35.9 - 50.0 fL    Platelet Count 131 (L) 164 - 446 K/uL    MPV 9.8 9.0 - 12.9 fL    Neutrophils-Polys 76.60 (H) 44.00 - 72.00 %    Lymphocytes 8.70 (L) 22.00 - 41.00 %    Monocytes 13.50 (H) 0.00 - 13.40 %    Eosinophils 0.50 0.00 - 6.90 %    Basophils 0.40 0.00 - 1.80 %    Immature Granulocytes 0.30 0.00 - 0.90 %    Nucleated RBC 0.00 /100 WBC    Neutrophils (Absolute) 7.37 1.82 - 7.42 K/uL    Lymphs (Absolute) 0.84 (L) 1.00 - 4.80 K/uL    Monos (Absolute) 1.30 (H) 0.00 - 0.85 K/uL    Eos (Absolute) 0.05 0.00 - 0.51 K/uL    Baso (Absolute) 0.04 0.00 - 0.12 K/uL    Immature Granulocytes (abs) 0.03 0.00 - 0.11 K/uL    NRBC (Absolute) 0.00 K/uL           Imaging:  Ct-cta Head With & W/o-post Process  Result Date: 12/15/2020  12/15/2020 12:57 AM  HISTORY/REASON FOR EXAM:  Stroke, follow up TECHNIQUE/EXAM DESCRIPTION: CT angiogram of the [place] without and with contrast.  Initial precontrast images were obtained of the head from the skull base through the vertex.  Postcontrast images were obtained of the [place] following the power injection of nonionic contrast at 5.0 mL/sec. Thin-section helical images were obtained with overlapping reconstruction interval. Coronal and sagittal multiplanar volume reformats were generated.  3D angiographic images were reviewed on PACS.  Maximum intensity projection (MIP) images were generated and reviewed. 80 mL of Omnipaque 350 nonionic contrast was injected intravenously. Low dose optimization technique was utilized for this CT exam including automated exposure control and adjustment of the mA and/or kV according to patient size. COMPARISON:  None. FINDINGS: The posterior circulation shows the distal vertebral arteries to be patent. The vertebrobasilar confluence is intact. The basilar artery is patent but narrowed 50% distally. No aneurysm or occlusive lesion is evident. Atherosclerotic changes of the bilateral intracranial internal carotid arteries are seen with less than 50% stenosis. There is occlusion of the right M1 segment with poor opacification of the remaining right total cerebral arterial tree. Moderate diffuse parenchymal volume loss is seen. Scattered periventricular and subcortical white matter low-attenuation changes are observed. Low-density encephalomalacia changes in the right frontal and parietal lobe is seen. Mild bilateral ventricular  dilatation seen, right greater than left. 3D angiographic/MIP images of the vasculature confirm the vascular findings as described above.     1.  Occlusion at the right M1 segment with no significant reconstitution to the right middle cerebral artery distribution 2.  50% narrowing of the distal basilar artery 3.  Changes of atrophy with nonspecific  white matter changes most commonly associated with small vessel ischemia. 4.  Right frontal and parietal encephalomalacia. These findings were discussed with the patient's clinician, Dr. Galindo, on 12/15/2020 3:14 AM.      Ct-cta Neck With & W/o-post Processing  Result Date: 12/15/2020  12/15/2020 12:57 AM HISTORY/REASON FOR EXAM:  Stroke, follow up TECHNIQUE/EXAM DESCRIPTION:  CT angiogram of the neck with contrast. Postcontrast images were obtained of the neck from the great vessels through the skull base following the power injection of nonionic contrast at 5.0 mL/sec. Thin-section helical images were obtained with overlapping reconstruction interval. Coronal and oblique multiplanar volume reformats were generated. Cervical internal carotid artery percent stenosis is calculated using the standard method according to the NASCET criteria wherein a segment of uniform caliber mid or distal cervical internal carotid is used as the reference denominator. 3D angiographic images were reviewed on PACS.  Maximum intensity projection (MIP) images were generated and reviewed 80 mL of Omnipaque 350 nonionic contrast was injected intravenously. Low dose optimization technique was utilized for this CT exam including automated exposure control and adjustment of the mA and/or kV according to patient size. COMPARISON: None FINDINGS: The arch origins of the great vessels appear intact. The aortic arch shows no abnormality. Scattered atherosclerotic changes are seen with less than 50% stenosis, otherwise the right common carotid artery, cervical carotid bifurcation, and cervical internal carotid artery are within normal limits. There is no evidence of significant stenosis, thrombus, or other filling defect or ulceration. There is no evidence of dissection or aneurysm. Scattered atherosclerotic changes are seen with less than 50% stenosis, otherwise the left common carotid artery, cervical carotid bifurcation, and cervical internal  carotid artery are within normal limits. There is no evidence of significant stenosis, thrombus, or other filling defect or ulceration. There is no evidence of dissection or aneurysm. The cervical vertebral arteries are normal bilaterally. Lung apices in the visualized field of view demonstrate reticular opacity in the right upper lobe. See dedicated CT angiogram of the head for intracranial finding 3D angiographic/MIP images of the vasculature confirm the vascular findings as described above.     1.  No high-grade stenosis, aneurysm, dissection, or occlusion. See dedicated CT angiogram the head for intracranial findings 2.  Reticular opacities in the right upper lobe, appearance suggests changes of Covid infiltrate.               ASSESSMENT:  Patient is a 65 y.o. male admitted with sudden onset left-sided weakness on 12/12 and fell over to side of the bed. [facility], found to have +Covid, and [facility] CT head showed right temporal and occipital subacute strokes. Outside TPA and thrombectomy window.       #Rehab:   -Vitals: stable, RA  -Insurance: [facility]  -Rehab Impairment Code: 0001.1 - Stroke: Left Body Involvement (Right Brain)  -Pending therapy eval, but patient with NIHSS of 0 on 12/15 per neurology and he wants to go home  -Also not a candidate for inpatient rehab at this time due to +Covid (will need 2 neg covid tests or >20 days since first positive test)  -Anticipate patient will discharge home with  when medically cleared      #Neuro: right temporal occipital infarcts, M1 occlusion of the right MCA; A1C 5.5% on 12/15  -asa   -atorvastatin  -pending MRI brain  to determine anticoag  -pending ECHO    #CV: AFib     #ID: +Covid     #Pain: Tylenol, tizanadine     #Bowel: none since admit 12/14    #Bladder: voiding     #DVT PPX: pending brain MRI           Thank you for allowing us to participate in the care of this patient.              Shilo Celestin MD  Physical Medicine and Rehabilitation   12/15/2020

## 2020-12-15 NOTE — PROGRESS NOTES
Patient angry and agitated stating he has not had anything to eat or drink and nothing for pain stating the doctor is taking to long, educated patient since he had a stroke we have to assess that he can swallow ok before giving fluids or food. Notified patient MD is putting orders in at this time.patient getting out of bed without help patient yelling he does not want the bed alarm on, educated patient is for his safety so he does not fall especially with the left sided weakness, patient states that he rather fall. Swallow eval done- patient passed, MD notified diet ordered, pain medication ordered for patient.

## 2020-12-15 NOTE — CONSULTS
Neurology STROKE CODE H&P  Neurohospitalist Service, [facility]    Referring Physician: Clementina Beauchamp M.D.    STROKE CODE: No chief complaint on file.      To obtain the most accurate data regarding the time called, and time patient seen, refer to the stroke run-sheet and chart.  For time of CT, refer to the radiology report. See A&P below for TPA Decision and door to needle time if and when applicable.    HPI: 65-year-old male was transferred from the [facility].  He carries diagnosis of cystic heart failure, MR, atrial fibrillation.  He had left sided weakness that started this past Saturday.  Now resolved.  Outside CT scan showed a subacute right temporal occipital infarct.  They said NIH scale was 6.  Currently is 0.  He says is back to normal.  However he is very combative.  Using cuss words also giving multiple staff the bird during interview.  He wants to go home.  When asked about anticoagulation he says he was on Pradaxa or Eliquis.  And that he is supposed be on it.  However is not listed in his home medications.    CTA head and neck last night showed an occluded right MCA artery with 50% stenosis the basilar artery.    Review of systems: In addition to what is detailed in the HPI above, (and scanned into the chart if and when applicable), all other systems reviewed and are negative.    Past Medical History:    has a past medical history of A-fib (HCC), A-fib (HCC) (09/17/2020), Backpain, CAD (coronary artery disease), Congestive heart failure (HCC) (10/08/2020), Diabetes, Fall, Headache, HTN (hypertension), Hypertension, Myocardial infarct (HCC), Neck pain, and Stroke (HCC).    FHx:  family history is not on file.    SHx:   reports that he has never smoked. He does not have any smokeless tobacco history on file. He reports that he does not drink alcohol or use drugs.    Allergies:  No Known Allergies    Medications:    Current Facility-Administered Medications:   •  Pharmacy  consult request - Allow for permissive hypertension: SBP up to 220 mmHg/DBP up to 120 mmHg x 48 hours, , Other, PHARMACY TO DOSE, Roe Delaney M.D.  •  aspirin (ASA) tablet 325 mg, 325 mg, Oral, DAILY, 325 mg at 12/15/20 0610 **OR** aspirin (ASA) chewable tab 324 mg, 324 mg, Oral, DAILY **OR** aspirin (ASA) suppository 300 mg, 300 mg, Rectal, DAILY, Roe Delaney M.D.  •  atorvastatin (LIPITOR) tablet 80 mg, 80 mg, Oral, Q EVENING, Roe Delaney M.D., 80 mg at 12/14/20 2022  •  tizanidine (ZANAFLEX) tablet 4 mg, 4 mg, Oral, Q6HRS PRN, Triston Galindo M.D., 4 mg at 12/15/20 0359  •  acetaminophen (Tylenol) tablet 650 mg, 650 mg, Oral, Q4HRS PRN, Triston Galindo M.D., 650 mg at 12/15/20 0438    Physical Examination:    Vitals:    12/15/20 0000 12/15/20 0049 12/15/20 0352 12/15/20 0745   BP: (!) 87/60 106/78 105/64 129/71   Pulse: 87  85    Resp: (!) 22  20 20   Temp: 37.1 °C (98.7 °F)  37.6 °C (99.6 °F) 36.5 °C (97.7 °F)   TempSrc: Temporal  Temporal Temporal   SpO2: 96%  94% 95%   Weight:       Height:           General: Patient is awake and in no acute distress  Eyes: examination of optic disks not indicated at this time  CV: RRR    NEUROLOGICAL EXAM:     Mental status: Awake, alert and fully oriented, follows commands  Speech and language: speech is clear and fluent. The patient is able to name and repeat.  Cranial nerve exam: Left pupil is equal reactive.  Right pupil disfigured to be blind chronic.  Visual limited on the right side appears to be his baseline.. Extraocular muscles are intact. Sensation in the face is intact to light touch. Face is symmetric. Hearing to finger rub equal. Palate elevates symmetrically. Shoulder shrug is full. Tongue is midline.  Motor exam: Left side 4+ on the arm and leg.  Right side is 5 and 5.  Sensory exam: No sensory deficits identified   Deep tendon reflexes:  1 and symmetric. Toes down-going bilaterally.  Coordination: no ataxia   Gait: Normal    NIH  Stroke Scale:    1a. Level of Consciousness (Alert, drowsy, etc): 0= Alert    1b. LOC Questions (Month, age): 0= Answers both correctly    1c. LOC Commands (Open/close eyes make fist/let go): 0= Obeys both correctly    2.   Best Gaze (Eyes open - patient follows examiner's finger on face): 0= Normal    3.   Visual Fields (introduce visual stimulus/threat to patient's field quadrants): 0= No visual loss  4.   Facial Paresis (Show teeth, raise eyebrows and squeeze eyes shut): 0= Normal     5a. Motor Arm - Left (Elevate arm to 90 degrees if patient is sitting, 45 degrees if  supine): 0= No drift    5b. Motor Arm - Right (Elevate arm to 90 degrees if patient is sitting, 45 degrees if supine): 0= No drift    6a. Motor Leg - Left (Elevate leg 30 degrees with patient supine): 0= No drift    6b. Motor Leg - Right  (Elevate leg 30 degrees with patient supine): 0= No drift    7.   Limb Ataxia (Finger-nose, heel down shin): 0= No ataxia    8.   Sensory (Pin prick to face, arm, trunk and leg - compare side to side): 0= Normal    9.  Best Language (Name item, describe a picture and read sentences): 0= No aphasia    10. Dysarthria (Evaluate speech clarity by patient repeating listed words): 0= Normal articulation    11. Extinction and Inattention (Use information from prior testing to identify neglect or  double simultaneous stimuli testing): 0= No neglect    Total NIH Score: 0    Modified Bonner Scale (MRS): 1 = No significant disability, despite symptoms; able to perform all usual duties and activities      Objective Data:    Labs:  Lab Results   Component Value Date/Time    PROTHROMBTM 10.6 02/11/2011 03:10 PM    INR 0.93 02/11/2011 03:10 PM      Lab Results   Component Value Date/Time    WBC 9.6 12/15/2020 04:23 AM    RBC 4.31 (L) 12/15/2020 04:23 AM    HEMOGLOBIN 13.6 (L) 12/15/2020 04:23 AM    HEMATOCRIT 40.8 (L) 12/15/2020 04:23 AM    MCV 94.7 12/15/2020 04:23 AM    MCH 31.6 12/15/2020 04:23 AM    MCHC 33.3 (L) 12/15/2020  04:23 AM    MPV 9.8 12/15/2020 04:23 AM    NEUTSPOLYS 76.60 (H) 12/15/2020 04:23 AM    LYMPHOCYTES 8.70 (L) 12/15/2020 04:23 AM    MONOCYTES 13.50 (H) 12/15/2020 04:23 AM    EOSINOPHILS 0.50 12/15/2020 04:23 AM    BASOPHILS 0.40 12/15/2020 04:23 AM      Lab Results   Component Value Date/Time    SODIUM 136 12/14/2020 07:58 PM    POTASSIUM 4.3 12/14/2020 07:58 PM    CHLORIDE 106 12/14/2020 07:58 PM    CO2 17 (L) 12/14/2020 07:58 PM    GLUCOSE 89 12/14/2020 07:58 PM    BUN 19 12/14/2020 07:58 PM    CREATININE 1.09 12/14/2020 07:58 PM      Lab Results   Component Value Date/Time    CHOLSTRLTOT 105 12/15/2020 04:23 AM    LDL 48 12/15/2020 04:23 AM    HDL 35 (A) 12/15/2020 04:23 AM    TRIGLYCERIDE 110 12/15/2020 04:23 AM       Lab Results   Component Value Date/Time    ALKPHOSPHAT 84 12/14/2020 07:58 PM    ASTSGOT 19 12/14/2020 07:58 PM    ALTSGPT 23 12/14/2020 07:58 PM    TBILIRUBIN 0.4 12/14/2020 07:58 PM        Imaging/Testing:  CT-CTA HEAD WITH & W/O-POST PROCESS   Final Result         1.  Occlusion at the right M1 segment with no significant reconstitution to the right middle cerebral artery distribution   2.  50% narrowing of the distal basilar artery   3.  Changes of atrophy with nonspecific white matter changes most commonly associated with small vessel ischemia.   4.  Right frontal and parietal encephalomalacia.      These findings were discussed with the patient's clinician, Dr. Galindo, on 12/15/2020 3:14 AM.      CT-CTA NECK WITH & W/O-POST PROCESSING   Final Result         1.  No high-grade stenosis, aneurysm, dissection, or occlusion. See dedicated CT angiogram the head for intracranial findings   2.  Reticular opacities in the right upper lobe, appearance suggests changes of Covid infiltrate.         MR-BRAIN-W/O    (Results Pending)   EC-ECHOCARDIOGRAM COMPLETE W/O CONT    (Results Pending)     Onset was Saturday, December 12, 2020  Not a TPA candidate due to outside window    Assessment and  Plan:    65-year-old male presenting with right temporal occipital infarcts.  Carry a diagnosis of atrial fibrillation.  Recently diagnosed Covid.  Etiology of the infarct most likely underlying A. fib with a M1 occlusion of the right MCA.  Will need MRI brain to evaluate when to start anticoagulation.      Plan:  1.  Continue aspirin 81 mg daily  2.  MRI brain  3.  Continue telemetry monitoring.  So far he is in normal sinus rhythm however reports that [facility].  I do not have reports to review.  4.  Starting anticoagulation will be dictated upon MRI brain results  5.  Blood pressure goal should be normotensive patients outside the window for permissive hypertension  6.  Glucose control per primary recommendations normal glycemia  7.  High intensity statin, continue Lipitor for an LDL goal below 70  8.  PT/OT/speech  9.  TTE with bubble    Update 3:30 PM    MRI brain shows small acute infarcts over the right parietal lobe.  Very distal.  Appears to be cardioembolic.  Patient has a right M1 occlusion.  I think this is chronic given the lack of large infarct.      Also recommend cardiology consult for evaluation of underlying atrial fibrillation.  Consider loop/Holter/Zio patch.  From neurology standpoint it is okay to start anticoagulation if the patient does have atrial fibrillation.  Also from neurology standpoint okay to be on dual antiplatelet therapy for his cardiac stents in the mean time.  I will leave decision in terms of leaving the patient on triple therapy up to cardiology.  Or to discontinue 1 antiplatelets if he does need anticoagulation.      We will sign off this time.  Patient can follow-up in neurology [facility].    The evaluation of the patient, and recommended management, was discussed with the resident staff.     This chart was partially generated using voice recognition technology and sound alike word replacement may be present, best efforts were made  to make the chart accurate.    Roni Purcell MD  Board Certified Neurology, ABPN  t) [phone]

## 2020-12-15 NOTE — PROGRESS NOTES
Assumed care of PT A&O 4. Pt is speaking with Neuro at this time. On RA. Tele monitor in place, cardiac rhythm being monitored. Call light within reach, bed in lowest position, upper bed rails up. Pt was updated on plan of care for the day. Will continue to monitor.

## 2020-12-15 NOTE — PROGRESS NOTES
HOSPITAL MEDICINE PROGRESS NOTE    Date of service  12/15/2020    Chief Complaint  No chief complaint on file.      Hospital Course:     65 years old man with cardiac history, risk factor presented with left-sided weakness secondary to acute CVA.  CT angio of the head and neck show occluded right MCA with 50% stenosis of the basilar artery.           Interval History Updates:  No event overnight.  Afebrile.    Seen by neurology, pending MRI brain to determine antiplatelet or anticoagulation.    Consultants/Specialty  Neurology    Review of Systems   Review of Systems   Constitutional: Negative for chills and fever.   HENT: Negative for ear pain and sore throat.    Eyes: Negative for blurred vision.   Respiratory: Negative for shortness of breath.    Cardiovascular: Negative for chest pain, palpitations and leg swelling.   Gastrointestinal: Negative for abdominal pain, blood in stool, constipation, diarrhea, heartburn, melena, nausea and vomiting.   Genitourinary: Negative for dysuria, hematuria and urgency.   Musculoskeletal: Negative for myalgias.   Skin: Negative for rash.   Neurological: Negative for dizziness, focal weakness, weakness and headaches.   Endo/Heme/Allergies: Negative.    Psychiatric/Behavioral: Negative for depression.   All other systems reviewed and are negative.       Medications:  • Pharmacy   PHARMACY TO DOSE   • aspirin  325 mg DAILY    Or   • aspirin  324 mg DAILY    Or   • aspirin  300 mg DAILY   • atorvastatin  80 mg Q EVENING   • tizanidine  4 mg Q6HRS PRN   • acetaminophen  650 mg Q4HRS PRN         •  Pharmacy, , Other, PHARMACY TO DOSE    •  aspirin, 325 mg, Oral, DAILY    Or    •  aspirin, 324 mg, Oral, DAILY    Or    •  aspirin, 300 mg, Rectal, DAILY    •  atorvastatin, 80 mg, Oral, Q EVENING        Physical Exam   Vitals:    12/15/20 0000 12/15/20 0049 12/15/20 0352 12/15/20 0745   BP: (!) 87/60 106/78 105/64 129/71   Pulse: 87  85    Resp: (!) 22  20 20   Temp: 37.1 °C (98.7 °F)   37.6 °C (99.6 °F) 36.5 °C (97.7 °F)   TempSrc: Temporal  Temporal Temporal   SpO2: 96%  94% 95%   Weight:       Height:           Physical Exam   Constitutional: He is oriented to person, place, and time and well-developed, well-nourished, and in no distress. No distress.   HENT:   Head: Normocephalic and atraumatic.   Eyes: Pupils are equal, round, and reactive to light. Conjunctivae are normal. No scleral icterus.   Neck: Normal range of motion. Neck supple. No JVD present.   Cardiovascular: Normal rate, regular rhythm and normal heart sounds. Exam reveals no gallop and no friction rub.   No murmur heard.  Pulmonary/Chest: Effort normal and breath sounds normal. No respiratory distress. He has no wheezes. He has no rales. He exhibits no tenderness.   Abdominal: Soft. Bowel sounds are normal. There is no abdominal tenderness. There is no rebound.   Musculoskeletal: Normal range of motion.         General: No tenderness, deformity or edema.   Neurological: He is alert and oriented to person, place, and time.   Possibly very slight weakness in the left upper extremity.   Skin: Skin is warm and dry. No rash noted. He is not diaphoretic. No erythema.   Psychiatric: Mood and affect normal.   Nursing note and vitals reviewed.         Laboratory   Recent Labs     12/15/20  0423   WBC 9.6   RBC 4.31*   HEMOGLOBIN 13.6*   HEMATOCRIT 40.8*   PLATELETCT 131*     Recent Labs     12/14/20 1958   SODIUM 136   POTASSIUM 4.3   CHLORIDE 106   CO2 17*   GLUCOSE 89   BUN 19   CREATININE 1.09      Recent Labs     12/14/20 1958   ALTSGPT 23   ASTSGOT 19   ALKPHOSPHAT 84   TBILIRUBIN 0.4   GLUCOSE 89                Microbiology  Results     ** No results found for the last 168 hours. **             Labs reviewed by me and noted above.    Radiology  EC-ECHOCARDIOGRAM COMPLETE W/O CONT  Results Will be Available after Interpretation by Cardiologist.  CT-CTA NECK WITH & W/O-POST PROCESSING  Narrative: 12/15/2020 12:57  AM    HISTORY/REASON FOR EXAM:  Stroke, follow up    TECHNIQUE/EXAM DESCRIPTION:  CT angiogram of the neck with contrast.    Postcontrast images were obtained of the neck from the great vessels through the skull base following the power injection of nonionic contrast at 5.0 mL/sec. Thin-section helical images were obtained with overlapping reconstruction interval. Coronal and   oblique multiplanar volume reformats were generated.    Cervical internal carotid artery percent stenosis is calculated using the standard method according to the NASCET criteria wherein a segment of uniform caliber mid or distal cervical internal carotid is used as the reference denominator.    3D angiographic images were reviewed on PACS.  Maximum intensity projection (MIP) images were generated and reviewed    80 mL of Omnipaque 350 nonionic contrast was injected intravenously.    Low dose optimization technique was utilized for this CT exam including automated exposure control and adjustment of the mA and/or kV according to patient size.    COMPARISON: None    FINDINGS:    The arch origins of the great vessels appear intact. The aortic arch shows no abnormality.    Scattered atherosclerotic changes are seen with less than 50% stenosis, otherwise the right common carotid artery, cervical carotid bifurcation, and cervical internal carotid artery are within normal limits. There is no evidence of significant stenosis,   thrombus, or other filling defect or ulceration. There is no evidence of dissection or aneurysm.    Scattered atherosclerotic changes are seen with less than 50% stenosis, otherwise the left common carotid artery, cervical carotid bifurcation, and cervical internal carotid artery are within normal limits. There is no evidence of significant stenosis,   thrombus, or other filling defect or ulceration. There is no evidence of dissection or aneurysm.    The cervical vertebral arteries are normal bilaterally.    Lung apices in the  visualized field of view demonstrate reticular opacity in the right upper lobe.    See dedicated CT angiogram of the head for intracranial finding    3D angiographic/MIP images of the vasculature confirm the vascular findings as described above.  Impression: 1.  No high-grade stenosis, aneurysm, dissection, or occlusion. See dedicated CT angiogram the head for intracranial findings  2.  Reticular opacities in the right upper lobe, appearance suggests changes of Covid infiltrate.  CT-CTA HEAD WITH & W/O-POST PROCESS  Narrative: 12/15/2020 12:57 AM    HISTORY/REASON FOR EXAM:  Stroke, follow up    TECHNIQUE/EXAM DESCRIPTION:    CT angiogram of the [place] without and with contrast.  Initial precontrast images were obtained of the head from the skull base through the vertex.  Postcontrast images were obtained of the [place] following the power injection of   nonionic contrast at 5.0 mL/sec. Thin-section helical images were obtained with overlapping reconstruction interval. Coronal and sagittal multiplanar volume reformats were generated.  3D angiographic images were reviewed on PACS.  Maximum intensity   projection (MIP) images were generated and reviewed.    80 mL of Omnipaque 350 nonionic contrast was injected intravenously.    Low dose optimization technique was utilized for this CT exam including automated exposure control and adjustment of the mA and/or kV according to patient size.    COMPARISON:  None.    FINDINGS:    The posterior circulation shows the distal vertebral arteries to be patent. The vertebrobasilar confluence is intact. The basilar artery is patent but narrowed 50% distally. No aneurysm or occlusive lesion is evident.    Atherosclerotic changes of the bilateral intracranial internal carotid arteries are seen with less than 50% stenosis. There is occlusion of the right M1 segment with poor opacification of the remaining right total cerebral arterial tree.    Moderate diffuse  "parenchymal volume loss is seen. Scattered periventricular and subcortical white matter low-attenuation changes are observed. Low-density encephalomalacia changes in the right frontal and parietal lobe is seen. Mild bilateral ventricular   dilatation seen, right greater than left.    3D angiographic/MIP images of the vasculature confirm the vascular findings as described above.  Impression: 1.  Occlusion at the right M1 segment with no significant reconstitution to the right middle cerebral artery distribution  2.  50% narrowing of the distal basilar artery  3.  Changes of atrophy with nonspecific white matter changes most commonly associated with small vessel ischemia.  4.  Right frontal and parietal encephalomalacia.    These findings were discussed with the patient's clinician, Dr. Galindo, on 12/15/2020 3:14 AM.      No results found for this or any previous visit.       Assessment and Plan      Active Problems:    CVA (cerebrovascular accident) (HCC) POA: Yes    Hypertension POA: Yes    Hyperlipemia POA: Yes    COVID-19 virus infection POA: Yes    Coronary artery disease involving native coronary artery of native heart POA: Yes  Resolved Problems:    * No resolved hospital problems. *      Continue supportive care, aspirin.  Lipitor 80 mg p.o. daily.  PT, OT, speech pathology eval.  His NIHSS score is 0 for neurology evaluation this morning.  MRI brain pending.  Result will determine anticoagulation per neurology.  Effient contraindicated in CVA patients, so either Brillinta/Plavix.    Need to clarify of possible ?afib? history.  Patient is not a reliable historian.  He told me today that he is \"NOT taking any blood thinner except for aspirin.\"  Appears to be back to his normal baseline.  Echo is pending.    DVT prophylaxis: SCD    CODE STATUS:  FULL CODE    Disposition: Patient wants to go home.      Addendum    Case discussed with Neuro and Cardiology.  [facility] \"history of [facility].\"  " Patient not a reliable historian.  If he has afib history, then medical management will change regarding anticoagulant.      Addendum 5:50 PM  EKG reviewed. ST lateral leads depressed slightly compared to yesterday's EKG.    Order repeat STAT EKG, troponin.  RN reports more confused.  Order CT head NON-CONT STAT.    Cardiology starting anticoagulation after [facility] reviewed.  Patient was on Pradaxa PTA.  Switch to Brilinta from Effient given CVA.  Stop ASA to lower bleeding risk.    Check CXR, DDimer, CRP.

## 2020-12-15 NOTE — H&P
Hospital Medicine History & Physical Note    Date of Service  12/14/2020    Primary Care Physician  Pcp Pt States None    Consultants  Neurology Dr. Purcell    Code Status  Full Code    Chief Complaint  Left-sided weakness    History of Presenting Illness  65 y.o. male who presented 12/14/2020 with sudden onset left-sided weakness upon awakening on 12/12/2020.  The patient is a 65-year-old  male with a significant cardiac history including right-sided cardiac stenting in October 2020 times for unknown distribution who [facility]  Apparently patient was in his usual state of health until approximately 12/12/2020 when he awoke from bed and fell over on his left side secondary to weakness.  Patient had never had episode like this before however awaited least day and a half before presenting to [facility].  [facility] because he had a positive COVID-19 test he required transfer to our facility as they did not have any more Covid beds.  They did do a CT of the head which was significant for right temporal and occipital subacute strokes.    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  Patient also has significant history of atrial fibrillation he is not on anticoagulation however is on prasugrel and also aspirin for recent cardiac stenting.  Patient denies subjective fevers rigors chills denies any nausea vomiting diarrhea.  Should be noted patient was febrile upon examination 101.4.  Patient will be admitted for post CVA evaluation and further work-up patient is clearly outside the window for thrombectomy and TPA.    I personally spoke with Dr. Purcell who graciously see this patient the morning of 12/15/2020.        Review of Systems  Review of Systems   Constitutional: Negative for diaphoresis and malaise/fatigue.   HENT: Negative  for congestion, sinus pain and sore throat.    Eyes: Positive for blurred vision and double vision. Negative for pain, discharge and redness.   Respiratory: Negative for cough, sputum production, shortness of breath, wheezing and stridor.    Cardiovascular: Negative for chest pain, palpitations and leg swelling.   Gastrointestinal: Negative for blood in stool, diarrhea, nausea and vomiting.   Genitourinary: Negative for flank pain, frequency and urgency.   Musculoskeletal: Negative for back pain, joint pain, myalgias and neck pain.   Skin: Negative for itching and rash.   Neurological: Positive for focal weakness and weakness. Negative for dizziness, tingling, seizures and headaches.   Endo/Heme/Allergies: Negative for polydipsia. Does not bruise/bleed easily.   Psychiatric/Behavioral: Negative for memory loss. The patient is not nervous/anxious and does not have insomnia.        Past Medical History   has a past medical history of Backpain, Diabetes, Fall, and Hypertension.    Surgical History   has a past surgical history that includes other orthopedic surgery.     Family History  family history is not on file.     Social History   reports that he has never smoked. He does not have any smokeless tobacco history on file. He reports that he does not drink alcohol or use drugs.    Allergies  No Known Allergies    Medications  Prior to Admission Medications   Prescriptions Last Dose Informant Patient Reported? Taking?   MULTIPLE VITAMINS-MINERALS PO 12/14/2020 at 0800  Yes No   Sig: Take 1 Tab by mouth every day with lunch.   amlodipine (NORVASC) 10 MG TABS 12/14/2020 at 0800  No No   Sig: Take 1 Tab by mouth every day.   aspirin (ASA) 325 MG TABS 12/14/2020 at 0800  No No   Sig: Take 1 Tab by mouth every day.   hydrochlorothiazide (HYDRODIURIL) 25 MG TABS 12/14/2020 at 0800  No No   Sig: Take 1 Tab by mouth every day.   hydrocodone-acetaminophen (NORCO) 5-325 MG TABS per tablet   No No   Sig: Take 1-2 Tabs by mouth  every four hours as needed for Mild Pain.   lisinopril (PRINIVIL) 20 MG TABS 12/14/2020 at 0800  No No   Sig: Take 1 Tab by mouth 2 Times a Day.   prasugrel (EFFIENT) 10 MG Tab 12/14/2020 at 0800  Yes Yes   Sig: Take 10 mg by mouth every day.   simvastatin (ZOCOR) 40 MG TABS 12/13/2020 at 0900  No No   Sig: Take 1 Tab by mouth every evening.   spironolactone (ALDACTONE) 25 MG Tab 12/14/2020 at 0800  Yes Yes   Sig: Take 25 mg by mouth every day.   tizanidine (ZANAFLEX) 4 MG Tab 12/14/2020 at 0800  Yes Yes   Sig: Take 4 mg by mouth 3 times a day.   topiramate (TOPAMAX) 25 MG Tab 12/13/2020 at 0800  Yes Yes   Sig: Take 25 mg by mouth every day.      Facility-Administered Medications: None       Physical Exam  Temp:  [38.6 °C (101.4 °F)] 38.6 °C (101.4 °F)  Pulse:  [93] 93  Resp:  [16] 16  BP: (141)/(95) 141/95  SpO2:  [96 %] 96 %    Physical Exam  Vitals signs reviewed.   Constitutional:       General: He is not in acute distress.     Appearance: He is obese. He is not ill-appearing.   HENT:      Head: Normocephalic and atraumatic.      Right Ear: External ear normal.      Left Ear: External ear normal.      Nose: No congestion or rhinorrhea.      Mouth/Throat:      Mouth: Mucous membranes are moist.   Eyes:      General: No scleral icterus.        Right eye: No discharge.         Left eye: No discharge.      Extraocular Movements: Extraocular movements intact.      Comments: Opacification right eye   Neck:      Musculoskeletal: Normal range of motion and neck supple. No neck rigidity or muscular tenderness.   Cardiovascular:      Rate and Rhythm: Normal rate and regular rhythm.      Pulses: Normal pulses.      Heart sounds: No murmur.   Pulmonary:      Effort: Pulmonary effort is normal. No respiratory distress.      Breath sounds: Normal breath sounds. No stridor. No wheezing.   Abdominal:      General: Abdomen is flat. There is no distension.      Palpations: Abdomen is soft.      Tenderness: There is no abdominal  tenderness.   Musculoskeletal: Normal range of motion.         General: No swelling or deformity.   Skin:     General: Skin is warm and dry.      Coloration: Skin is not jaundiced or pale.   Neurological:      Mental Status: He is alert and oriented to person, place, and time.      Comments: Patient has 4 5 strength left lower extremity patient has 4 5 strength left upper extremity   Psychiatric:         Mood and Affect: Mood normal.         Thought Content: Thought content normal.         Laboratory:          No results for input(s): ALTSGPT, ASTSGOT, ALKPHOSPHAT, TBILIRUBIN, DBILIRUBIN, GAMMAGT, AMYLASE, LIPASE, ALB, PREALBUMIN, GLUCOSE in the last 72 hours.      No results for input(s): NTPROBNP in the last 72 hours.      No results for input(s): TROPONINT in the last 72 hours.    Imaging:  CT-CTA HEAD WITH & W/O-POST PROCESS    (Results Pending)   CT-CTA NECK WITH & W/O-POST PROCESSING    (Results Pending)   MR-BRAIN-W/O    (Results Pending)   EC-ECHOCARDIOGRAM COMPLETE W/O CONT    (Results Pending)         Assessment/Plan:  I anticipate this patient will require at least two midnights for appropriate medical management, necessitating inpatient admission.    CVA (cerebrovascular accident) (HCC)- (present on admission)  Assessment & Plan  Patient with evidence of subacute right temporal and right occipital infarcts.  Neurology Dr. Purcell aware patient    Plan:  1.  Patient to get a CTA of the head and neck with and without contrast, MRI without contrast  2.  Aspirin was in first 24 to 48 hours  3.  Physical therapy, Occupational Therapy, speech therapy  4.  As soon as possible when cleared by neurology patient may resume his prasugrel as documented in his chart    DIABETES MELLITUS  Assessment & Plan  Patient will be on insulin sliding scale to keep blood sugars less than 180 while hospitalized    Hyperlipemia- (present on admission)  Assessment & Plan  Continue high intensity statins    Hypertension- (present  on admission)  Assessment & Plan  Patient will have labetalol 10 mg as needed to keep blood pressure less than 180 and diastolic blood pressure less than 110

## 2020-12-16 PROBLEM — J18.9 PNEUMONIA DUE TO INFECTIOUS ORGANISM: Status: ACTIVE | Noted: 2020-01-01

## 2020-12-16 NOTE — PROGRESS NOTES
"Pharmacy Kinetics 65 y.o. male on vancomycin day # 1 2020    Currently on Vancomycin 1400 mg iv q24hr - starting 20 hrs post loading dose  Provider specified end date: TBD    Indication for Treatment: Empiric - R/O PNA    Pertinent history per medical record: [facility], found to have CVA likely embolic.  Found to be + COVID and [facility].  12/15/2020 began spiking fevers, 2020 broad spectrum antibiotics initiated.    PMH: CAD w/stents, HTN, DM          Other antibiotics: Rocephin 1 g IV daily    Allergies: Patient has no known allergies.     List concerns for renal function: BUN/SCr ratio 20:1, contrast 12/15/20    Pertinent cultures to date:   12/15/2020:  PBC x 2 - in process    MRSA nares swab if pneumonia is a concern: Yes 20 - RN to collect    Recent Labs     12/15/20  0423 20  0237   WBC 9.6 8.7   NEUTSPOLYS 76.60* 70.30     Recent Labs     20  1958 20  0237   BUN 19 23*   CREATININE 1.09 1.15   ALBUMIN 4.7  --      No results for input(s): VANCOTROUGH, VANCOPEAK, VANCORANDOM in the last 72 hours.    Intake/Output Summary (Last 24 hours) at 2020 1558  Last data filed at 2020 0700  Gross per 24 hour   Intake 480 ml   Output 500 ml   Net -20 ml      /66   Pulse 82   Temp 36.5 °C (97.7 °F) (Temporal)   Resp 15   Ht 1.676 m (5' 6\")   Wt 70.4 kg (155 lb 3.3 oz)   SpO2 95%  Temp (24hrs), Av.8 °C (101.9 °F), Min:36.5 °C (97.7 °F), Max:41.1 °C (106 °F)      A/P Indication:  Empiric - R/O PNA  1. Vancomycin dose change: new start  2. Next vancomycin level: 2020 (priort to 4th total dose) - not currently ordered  3. Goal trough: 15-20 mcg/mL  4. Comments: Patient on room air, no leukocytosis, lactic acid < 2.5, CXr RUL opacities worrisome for PNA.  [facility].  Fevers improved overnight with tylenol.  MRSA nares ordered.  D/W MD " recommend de-escalation of antibiotic therapy to Unasyn versus Rocephin.  Rocephin started, MD would like to keep vancomycin pending MRSA nares and will check PCT.  Renal function stable, UOP documented but question completeness.  Will give loading dose and plan to start q 24 hr dose 20 hrs post loading dose (to be entered in AM).  Renal labs daily x 2 ordered.  Plan trough 12/19/2020 prior to 4th total dose if vancomycin to continue.  Pharmacy to monitor and adjust as needed.              Toma Bhandari, PharmD, BCPS

## 2020-12-16 NOTE — THERAPY
Occupational Therapy   Initial Evaluation     Patient Name: Emeterio Christianson  Age:  65 y.o., Sex:  male  Medical Record #: [id number]  Today's Date: 12/16/2020     Precautions  Precautions: Fall Risk  Comments: hx of CVA with residual L-sided deficits    Assessment  Patient is 65 y.o. male with a diagnosis of COVID 19.   Pt currently limited by decreased activity tolerance, cognition, coordination, balance,which are affecting pt's ability to complete ADLs/IADLs at baseline. Pt would benefit from OT services in the acute care setting to maximize functional recovery.       Plan    Recommend Occupational Therapy 2 times per week for 2 visits for the following treatments:  Self Care/Activities of Daily Living.       Discharge Recommendations: (P) Recommend home health for continued occupational therapy services        12/16/20 0819   Prior Living Situation   Prior Services Unable To Determine At This Time   Housing / Facility 1 Story House   Steps Into Home 1   Equipment Owned Front-Wheel Walker;Single Point Cane   Lives with - Patient's Self Care Capacity Friends   Comments states that roommated do not want him returning home if he is positive   Prior Level of ADL Function   Self Feeding Independent   Grooming / Hygiene Independent   Bathing Independent   Dressing Independent   Toileting Independent   ADL Assessment   Grooming Supervision   Upper Body Dressing Supervision   Lower Body Dressing Minimal Assist   Functional Mobility   Sit to Stand Supervised   Bed, Chair, Wheelchair Transfer   (SBA)   Short Term Goals   Short Term Goal # 1 supervised with LB dressing

## 2020-12-16 NOTE — PROGRESS NOTES
Patient continues to try and get out of bed, patient then states he wants to fall and purposely bends knees to go to the floor able to place patient back in bed. Lap belt placed. Ice packs placed for fever

## 2020-12-16 NOTE — PROGRESS NOTES
HOSPITAL MEDICINE PROGRESS NOTE    Date of service  12/16/2020    Chief Complaint  No chief complaint on file.      Hospital Course:     65 years old man with cardiac history, risk factor presented with left-sided weakness secondary to acute CVA.  CT angio of the head and neck show occluded right MCA with 50% stenosis of the basilar artery.   On HD#3, develop fever with CXR consistent with PNA, started on abx.         Interval History Updates:  Spiking fever over night, as high as 40 C      Consultants/Specialty  Neurology    Review of Systems   Review of Systems   Constitutional: Negative for chills and fever.   HENT: Negative for ear pain and sore throat.    Eyes: Negative for blurred vision.   Respiratory: Negative for shortness of breath.    Cardiovascular: Negative for chest pain, palpitations and leg swelling.   Gastrointestinal: Negative for abdominal pain, blood in stool, constipation, diarrhea, heartburn, melena, nausea and vomiting.   Genitourinary: Negative for dysuria, hematuria and urgency.   Musculoskeletal: Negative for myalgias.   Skin: Negative for rash.   Neurological: Negative for dizziness, focal weakness, weakness and headaches.   Endo/Heme/Allergies: Negative.    Psychiatric/Behavioral: Negative for depression.   All other systems reviewed and are negative.       Medications:  • ticagrelor  90 mg BID   • dabigatran  150 mg BID   • lisinopril  20 mg Q DAY   • amLODIPine  10 mg Q DAY   • morphine injection  2 mg Q4HRS PRN   • melatonin  5 mg HS PRN   • labetalol  10 mg Q4HRS PRN   • acetaminophen  325 mg Once   • LORazepam  0.5 mg Once   • atorvastatin  80 mg Q EVENING   • tizanidine  4 mg Q6HRS PRN   • acetaminophen  650 mg Q4HRS PRN         •  ticagrelor, 90 mg, Oral, BID    •  dabigatran, 150 mg, Oral, BID    •  lisinopril, 20 mg, Oral, Q DAY    •  amLODIPine, 10 mg, Oral, Q DAY    •  acetaminophen, 325 mg, Rectal, Once    •  LORazepam, 0.5 mg, Intravenous, Once    •  atorvastatin, 80 mg,  Oral, Q EVENING        Physical Exam   Vitals:    12/16/20 0342 12/16/20 0619 12/16/20 0709 12/16/20 1224   BP: 125/81  115/74 110/66   Pulse: (!) 106  92 82   Resp: 19  18 15   Temp: (!) 38.7 °C (101.6 °F) 37.6 °C (99.7 °F) 37.2 °C (99 °F) 36.5 °C (97.7 °F)   TempSrc: Temporal Temporal Temporal Temporal   SpO2: 94%  95% 95%   Weight:       Height:           Physical Exam   Constitutional: He is oriented to person, place, and time and well-developed, well-nourished, and in no distress. No distress.   HENT:   Head: Normocephalic and atraumatic.   Eyes: Pupils are equal, round, and reactive to light. Conjunctivae are normal. No scleral icterus.   Neck: Normal range of motion. Neck supple. No JVD present.   Cardiovascular: Normal rate, regular rhythm and normal heart sounds. Exam reveals no gallop and no friction rub.   No murmur heard.  Pulmonary/Chest: Effort normal and breath sounds normal. No respiratory distress. He has no wheezes. He has no rales. He exhibits no tenderness.   Abdominal: Soft. Bowel sounds are normal. There is no abdominal tenderness. There is no rebound.   Musculoskeletal: Normal range of motion.         General: No tenderness, deformity or edema.   Neurological: He is alert and oriented to person, place, and time.   Possibly very slight weakness in the left upper extremity.   Skin: Skin is warm and dry. No rash noted. He is not diaphoretic. No erythema.   Psychiatric: Mood and affect normal.   Nursing note and vitals reviewed.         Laboratory   Recent Labs     12/15/20  0423 12/16/20 0237   WBC 9.6 8.7   RBC 4.31* 4.74   HEMOGLOBIN 13.6* 15.0   HEMATOCRIT 40.8* 45.6   PLATELETCT 131* 133*     Recent Labs     12/14/20 1958 12/16/20 0237   SODIUM 136 133*   POTASSIUM 4.3 4.1   CHLORIDE 106 100   CO2 17* 18*   GLUCOSE 89 88   BUN 19 23*   CREATININE 1.09 1.15      Recent Labs     12/14/20 1958 12/16/20 0237   ALTSGPT 23  --    ASTSGOT 19  --    ALKPHOSPHAT 84  --    TBILIRUBIN 0.4  --   "  GLUCOSE 89 88                Microbiology  Results     Procedure Component Value Units Date/Time    URINALYSIS [[id number]]  (Abnormal) Collected: 12/15/20 2236    Order Status: Completed Specimen: Urine, Clean Catch Updated: 12/15/20 2318     Color Yellow     Character Clear     Specific Gravity 1.020     Ph 5.5     Glucose Negative mg/dL      Ketones Negative mg/dL      Protein 100 mg/dL      Bilirubin Negative     Urobilinogen, Urine 0.2     Nitrite Negative     Leukocyte Esterase Negative     Occult Blood Negative     Micro Urine Req Microscopic    Narrative:      Droplet, Contact, and Eye [id number] MARY HEADLEY    BLOOD CULTURE [[id number]] Collected: 12/15/20 2100    Order Status: Completed Specimen: Blood from Peripheral Updated: 12/15/20 2123    Narrative:      Droplet, Contact, and Eye Protection  Per Hospital Policy: Only change Specimen Src: to \"Line\" if  specified by physician order.    BLOOD CULTURE [[id number]] Collected: 12/15/20 2100    Order Status: Completed Specimen: Blood from Peripheral Updated: 12/15/20 2121    Narrative:      Droplet, Contact, and Eye Protection  Per Hospital Policy: Only change Specimen Src: to \"Line\" if  specified by physician order.    BLOOD CULTURE [[id number]]     Order Status: Canceled Specimen: Blood from Peripheral     BLOOD CULTURE [[id number]]     Order Status: Canceled Specimen: Blood from Peripheral     BLOOD CULTURE [[id number]]     Order Status: Canceled Specimen: Blood from Peripheral     BLOOD CULTURE [[id number]]     Order Status: Canceled Specimen: Blood from Peripheral              Labs reviewed by me and noted above.    Radiology  DX-CHEST-PORTABLE (1 VIEW)  Narrative: 12/15/2020 6:38 PM    HISTORY/REASON FOR EXAM:  Fever.    TECHNIQUE/EXAM DESCRIPTION AND NUMBER OF VIEWS:  Single portable view of the chest.    COMPARISON: 2/17/2011    FINDINGS:    The cardiomediastinal silhouette is stable. There are ill-defined airspace opacities in the right upper " lobe. No pleural effusion or pneumothorax is identified. There is mild elevation of the right hemidiaphragm. Costophrenic angles are clear.  Impression: Ill-defined airspace opacities in the right upper lobe worrisome for pneumonia.    Mild cardiomegaly.  CT-HEAD W/O  Narrative: 12/15/2020 6:07 PM    HISTORY/REASON FOR EXAM:  Altered mental status.    TECHNIQUE/EXAM DESCRIPTION AND NUMBER OF VIEWS:  CT of the head without contrast.    Contiguous axial sections were obtained from the skull base through the vertex.    Up to date radiation dose reduction adjustments have been utilized to meet ALARA standards for radiation dose reduction.    COMPARISON:  12/15/2020    FINDINGS:    The is no evidence of intraparenchymal, intraventricular and extra-axial hemorrhage. Chronic lacunar infarcts are seen in the right frontoparietal white matter. There is an area of encephalomalacia in the right basal ganglia involving the posterior   insula. There are periventricular and subcortical white matter changes present.  There is cortical atrophy. The ventricles are stable in size with ex vacuo dilatation of the right lateral ventricle. Atherosclerotic calcification is seen. There is an old   lacunar infarct in the right baldo. There is a mucous retention cyst in the right maxillary sinus. Remaining visualized paranasal sinuses and mastoid air cells are clear. Calvarium is intact.  Impression: Small acute right frontoparietal infarcts were better assessed on same day MRI.    Old lacunar infarcts in the right frontoparietal white matter, basal ganglia, right insula and right baldo.    Atrophy  MR-BRAIN-W/O  Narrative: 12/15/2020 2:56 PM    HISTORY/REASON FOR EXAM:  Stroke, follow up.    TECHNIQUE/EXAM DESCRIPTION:  MRI of the brain without contrast.    T1 sagittal, T2 fast spin-echo axial, T1 coronal, FLAIR coronal, diffusion-weighted and apparent diffusion coefficient (ADC map) axial images were obtained of the whole brain.    The study  was performed on a [employer] 1.5 [employer]oke MRI scanner.    COMPARISON:  CT angiogram 12/15/2020. Brain MRI 2/12/2011    FINDINGS:  Correlating with prior CTA there are findings of right MCA occlusion, possibly subacute or chronic. There are multiple small chronic infarcts and asymmetric volume loss within the right cerebral hemisphere. There is chronic cortical infarcts most   conspicuous involving the posterior superior right temporal lobe and insular ribbon with numerous small white matter infarcts involving the right periventricular white matter in the corona radiata and centrum semiovale. There are also chronic lacunar   infarcts in the bilateral thalami, left basal ganglia and right baldo.    There multifocal small acute right cerebral infarcts with a punctate infarct in the right frontal white matter, small cortical infarcts in the posterior lateral right parietal lobe. There may also be a punctate recent infarct in the left basal ganglia.    There is generalized cerebral atrophy, more pronounced in the right cerebral hemisphere with ex vacuo dilatation of the right lateral ventricle. There are additional confluent and scattered areas of T2 FLAIR hyperintensity in the cerebral white matter   most likely representing mild to moderate chronic microvascular ischemic changes. There is no acute intracranial hemorrhage or extra-axial fluid collection. No mass effect, midline shift or hydrocephalus.    Small mucous retention cyst in the right maxillary sinus.    Right globe demonstrates prior lens surgery and there is abnormal contour of the right globe.  Impression: 1.  Findings of right MCA occlusion with acute/subacute punctate infarcts in the right frontal white matter and small right parietal region cortical infarcts and several chronic right cerebral infarcts as detailed above.  2.  Probable additional punctate recent left basal ganglia lacunar infarct.  3.  Additional chronic lacunar infarcts in the basal ganglia,  thalami and right baldo.  4.  Generalized cerebral atrophy and chronic microvascular ischemic type changes.  5.  No acute intracranial hemorrhage.  EC-ECHOCARDIOGRAM COMPLETE W/O CONT  Transthoracic  Echo Report    Echocardiography Laboratory    CONCLUSIONS  Prior study done 2011, compared to the report of the study done -   there has been no significant change.   Normal left ventricular systolic function.  Left ventricular ejection fraction is visually estimated to be 65%.  No significant valve abnormalities.   A definite cardiac source for a systemic thromboembolic event is not   identified, failure to do so does not exclude its presence. If   clinically indicated, a transesophageal study would be useful.     VICKYJENNIEHA CASILLAS  Exam Date:         12/15/2020                      09:55  Exam Location:     Inpatient  Priority:          Routine    Ordering Physician:        PRATIK GARCIA  Referring Physician:       [id number] Meraz  Sonographer:               Aisha Ledesma JAIRO    Age:    65     Gender:    M  MRN:    [id number]  :    1955  BSA:    1.79   Ht (in):    66     Wt (lb):    155  Exam Type:     Complete    Indications:     CVA  ICD Codes:       436    CPT Codes:       24477    BP:   141    /   95     HR:   73  Technical Quality:       Fair    MEASUREMENTS  (Male / Female) Normal Values  2D ECHO  LV Diastolic Diameter PLAX        2.8 cm                4.2 - 5.9 / 3.9 - 5.3   cm  LV Systolic Diameter PLAX         1.4 cm                2.1 - 4.0 cm  IVS Diastolic Thickness           1.1 cm                  LVPW Diastolic Thickness          1.2 cm                  LVOT Diameter                     2.2 cm                  Estimated LV Ejection Fraction    65 %                    LV Ejection Fraction MOD BP       47.2 %                >= 55  %  LV Ejection Fraction MOD 4C       56.8 %                  LV Ejection Fraction MOD 2C       31.4 %                  IVC Diameter                      1.3 cm                     DOPPLER  AV Peak Velocity                  1.5 m/s                 AV Peak Gradient                  8.7 mmHg                AV Mean Gradient                  4.9 mmHg                LVOT Peak Velocity                1.3 m/s                 AV Area Cont Eq vti               3.3 cm2                 Mitral E Point Velocity           0.57 m/s                Mitral E to A Ratio               0.53                    MV Pressure Half Time             75.3 ms                 MV Area PHT                       2.9 cm2                 MV Deceleration Time              260 ms                    * Indicates values subject to auto-interpretation  LV EF:  65    %    FINDINGS  Left Ventricle  Normal left ventricular chamber size. Mild concentric left ventricular   hypertrophy. Normal left ventricular systolic function. Left   ventricular ejection fraction is visually estimated to be 65%. Normal   regional wall motion. Normal diastolic function.    Right Ventricle  The right ventricle was normal in size and function.    Right Atrium  The right atrium is normal in size.    Left Atrium  The left atrium is normal in size.  Left atrial volume index is 21   mL/sq m.    Mitral Valve  Structurally normal mitral valve without significant stenosis or   regurgitation.    Aortic Valve  Structurally normal aortic valve without significant stenosis or   regurgitation.    Tricuspid Valve  Structurally normal tricuspid valve without significant stenosis or   regurgitation.    Pulmonic Valve  Structurally normal pulmonic valve without significant stenosis or   regurgitation.    Pericardium  Normal pericardium without effusion.    Aorta  The aortic root is normal.  Ascending aorta diameter is 2.6 cm.    Randall Shepherd M.D.  (Electronically Signed)  Final Date:     15 December 2020                   11:55  CT-CTA NECK WITH & W/O-POST PROCESSING  Narrative: 12/15/2020 12:57 AM    HISTORY/REASON FOR EXAM:  Stroke, follow  up    TECHNIQUE/EXAM DESCRIPTION:  CT angiogram of the neck with contrast.    Postcontrast images were obtained of the neck from the great vessels through the skull base following the power injection of nonionic contrast at 5.0 mL/sec. Thin-section helical images were obtained with overlapping reconstruction interval. Coronal and   oblique multiplanar volume reformats were generated.    Cervical internal carotid artery percent stenosis is calculated using the standard method according to the NASCET criteria wherein a segment of uniform caliber mid or distal cervical internal carotid is used as the reference denominator.    3D angiographic images were reviewed on PACS.  Maximum intensity projection (MIP) images were generated and reviewed    80 mL of Omnipaque 350 nonionic contrast was injected intravenously.    Low dose optimization technique was utilized for this CT exam including automated exposure control and adjustment of the mA and/or kV according to patient size.    COMPARISON: None    FINDINGS:    The arch origins of the great vessels appear intact. The aortic arch shows no abnormality.    Scattered atherosclerotic changes are seen with less than 50% stenosis, otherwise the right common carotid artery, cervical carotid bifurcation, and cervical internal carotid artery are within normal limits. There is no evidence of significant stenosis,   thrombus, or other filling defect or ulceration. There is no evidence of dissection or aneurysm.    Scattered atherosclerotic changes are seen with less than 50% stenosis, otherwise the left common carotid artery, cervical carotid bifurcation, and cervical internal carotid artery are within normal limits. There is no evidence of significant stenosis,   thrombus, or other filling defect or ulceration. There is no evidence of dissection or aneurysm.    The cervical vertebral arteries are normal bilaterally.    Lung apices in the visualized field of view demonstrate reticular  opacity in the right upper lobe.    See dedicated CT angiogram of the head for intracranial finding    3D angiographic/MIP images of the vasculature confirm the vascular findings as described above.  Impression: 1.  No high-grade stenosis, aneurysm, dissection, or occlusion. See dedicated CT angiogram the head for intracranial findings  2.  Reticular opacities in the right upper lobe, appearance suggests changes of Covid infiltrate.  CT-CTA HEAD WITH & W/O-POST PROCESS  Narrative: 12/15/2020 12:57 AM    HISTORY/REASON FOR EXAM:  Stroke, follow up    TECHNIQUE/EXAM DESCRIPTION:    CT angiogram of the [place] without and with contrast.  Initial precontrast images were obtained of the head from the skull base through the vertex.  Postcontrast images were obtained of the [place] following the power injection of   nonionic contrast at 5.0 mL/sec. Thin-section helical images were obtained with overlapping reconstruction interval. Coronal and sagittal multiplanar volume reformats were generated.  3D angiographic images were reviewed on PACS.  Maximum intensity   projection (MIP) images were generated and reviewed.    80 mL of Omnipaque 350 nonionic contrast was injected intravenously.    Low dose optimization technique was utilized for this CT exam including automated exposure control and adjustment of the mA and/or kV according to patient size.    COMPARISON:  None.    FINDINGS:    The posterior circulation shows the distal vertebral arteries to be patent. The vertebrobasilar confluence is intact. The basilar artery is patent but narrowed 50% distally. No aneurysm or occlusive lesion is evident.    Atherosclerotic changes of the bilateral intracranial internal carotid arteries are seen with less than 50% stenosis. There is occlusion of the right M1 segment with poor opacification of the remaining right total cerebral arterial tree.    Moderate diffuse parenchymal volume loss is seen. Scattered  periventricular and subcortical white matter low-attenuation changes are observed. Low-density encephalomalacia changes in the right frontal and parietal lobe is seen. Mild bilateral ventricular   dilatation seen, right greater than left.    3D angiographic/MIP images of the vasculature confirm the vascular findings as described above.  Impression: 1.  Occlusion at the right M1 segment with no significant reconstitution to the right middle cerebral artery distribution  2.  50% narrowing of the distal basilar artery  3.  Changes of atrophy with nonspecific white matter changes most commonly associated with small vessel ischemia.  4.  Right frontal and parietal encephalomalacia.    These findings were discussed with the patient's clinician, Dr. Galindo, on 12/15/2020 3:14 AM.      No results found for this or any previous visit.       Assessment and Plan      Active Problems:    CVA (cerebrovascular accident) (HCC) POA: Yes    Hypertension POA: Yes    Hyperlipemia POA: Yes    COVID-19 virus infection POA: Yes    Coronary artery disease involving native coronary artery of native heart POA: Yes    Pneumonia due to infectious organism POA: No  Resolved Problems:    * No resolved hospital problems. *      CXR yesterday show RUL infiltrate.  No previous CXR to compare to.  Appearance consistent with PNA, not so much COVID-19 pulmonary disease.  Hemodynamics stable.  No leukocytosis.  Not hypoxic.    Start abx for oscar-care associated PNA with cefepime, vanco.    Screen for MRSA and if NEG, then can DC MRSA coverage.    Check inflammatory markers.  Cont Brilinta, Pradaxa for his CVA, source suspect embolic.  Cont amlodipine, lisinopril for HTN  Repeat CT head NEG for bleed.    Continue supportive care, aspirin.  Lipitor 80 mg p.o. daily.  PT, OT, speech pathology eval.    DVT prophylaxis: SCD and Fully Anticoagulated.    CODE STATUS:  FULL CODE    Disposition: He says that his roomates will not let him return to their  home, so he has no place to go after discharge.  Discussed with MSW to find alternative DC housing.

## 2020-12-16 NOTE — THERAPY
Physical Therapy   Initial Evaluation     Patient Name: Emeterio Christianson  Age:  65 y.o., Sex:  male  Medical Record #: [id number]  Today's Date: 12/16/2020     Precautions: Fall Risk    Assessment  Patient is 65 y.o. male presents acutely on 12/14 with L-sided weakness, MRI revealed no acute infarct. Pt also found to be COVID +. Pt demonstrating RLE strength 5/5 and LLE strength 4/5 with MMT. He reports no sensation deficit BLE but does report tingling up back. Unable to obtain orientation questions from pt as he appears to be joking and [place]. He tolerated 50ft of amb with FWW and SBA. Fxnl mob appears at/near fxnl baseline and therefore pt does not require acute skilled PT intervention. Recommend HHPT once DC. Pt appears to have baseline cognitive deficits impacting his safety, would recommend 24/7 SPV once discharged.     Plan    Recommend Physical Therapy for Evaluation only. Patient will not be actively followed for physical therapy services at this time, however may be seen if requested by physician for 1 more visit within 30 days to address any discharge or equipment needs.    DC Equipment Recommendations: None  Discharge Recommendations: Recommend home health for continued physical therapy services(see above)     Objective     12/16/20 0831   Prior Living Situation   Prior Services Unable To Determine At This Time   Housing / Facility 1 Story House   Steps Into Home 1   Equipment Owned Front-Wheel Walker;Single Point Cane   Lives with - Patient's Self Care Capacity Friends   Comments Pt states he has 4 roommate friends he lives with. He reports his roommates do not want him to return home due to him having COVID. Pt reports his roommates assist him with groceries.   Prior Level of Functional Mobility   Bed Mobility Independent   Transfer Status Independent   Ambulation Independent   Distance Ambulation (Feet) (limited community distances)   Assistive Devices Used Front-Wheel Walker  "(intermittently uses SPC)   Cognition    Speech/ Communication Delayed Responses   Level of Consciousness Alert   Safety Awareness Impaired   Attention Impaired   Sequencing Impaired   Comments Unable to obtain orientation as pt appeared to be joking when answering. When asked where he was he stated \"[facility] [place] because [employer] wanted to speak with me\"   Strength Lower Body   Comments RLE 5/5, LLE 4/5   Sensation Lower Body   Lower Extremity Sensation   WDL   Comments reports tingling only in his back   Balance Assessment   Sitting Balance (Static) Fair +   Sitting Balance (Dynamic) Fair +   Standing Balance (Static) Fair   Standing Balance (Dynamic) Fair -   Gait Analysis   Gait Level Of Assist (SBA 2/2 impulsivity)   Assistive Device Front Wheel Walker   Distance (Feet) 50   Deviation Bradykinetic;Shuffled Gait;Decreased Heel Strike;Decreased Toe Off   # of Stairs Climbed 0   Weight Bearing Status No restrictions   Comments Distance limited by fatigue   Bed Mobility    Supine to Sit Supervised   Sit to Supine Supervised   Comments VC to sequence bed mob to increase ind   Functional Mobility   Sit to Stand Supervised   Bed, Chair, Wheelchair Transfer (SBA)         "

## 2020-12-16 NOTE — PROGRESS NOTES
Patient now agreeable to taking tylenol, prn b/p medication provided, per MD for fall no new orders, orders for suppository tylenol, and blood cultures on top of labs already being ordered.

## 2020-12-16 NOTE — PROGRESS NOTES
"Pt fund trying to get out of bed, unaware of where he is, asking for Kenneth (his roommate at home). Shuffle to bathroom to pee but then he said he had no idea why he needed to pee and then tried washing his hands. Claims head is 10/10 pain but speaking normally. No dysphagia or dysarthria. Talking \"in circles\" and just wants \"to shut off his mind.\" CT w/o contrast was ordered, awaiting transport.   "

## 2020-12-16 NOTE — DISCHARGE PLANNING
[facility]    MRI 12/15   1.  Findings of right MCA occlusion with acute/subacute punctate infarcts in the right frontal white matter and small right parietal region cortical infarcts and several chronic right cerebral infarcts as detailed above.    Dc disposition / support is uncertain at this time as pt was living w/ roomates who are concerned about pt returning home.      Post acute recommendations are for a facility with [facility]    Thank you for referral.

## 2020-12-16 NOTE — PROGRESS NOTES
Called to patients room, that patient had fallen, patient on the floor besides bed on knees, patient agitated and yelling not following directions, vitals taken b/p elevated, fever.  patient refusing to assess after 10 minutes patient let this rn assess patient. Aox3, denies hitting head he said he slipped, onto his knees. No injury seen patient stated he needed to use the restroom. Assisted patient back to bed. md paged

## 2020-12-16 NOTE — PROGRESS NOTES
"Pt says he is going to leave AMA because his \"heart is going to explode.\" MD aware, does not want to order EKG, discussing next steps with neuro, cards and awaiting [facility] rcords  "

## 2020-12-16 NOTE — ASSESSMENT & PLAN NOTE
CXR on admission show RUP infiltrate  Was on ceftriaxone which was changed to Augmentin  However on 12/22 noted to be septic with worsening respiratory failure, differentials include aspiration and COVID19 pneumonia

## 2020-12-16 NOTE — PROGRESS NOTES
Assumed care of PT A&O 3. Pt. Mildly agitated, q4 neuro checks. On RA. Tele monitor in place, cardiac rhythm being monitored. Call light within reach, bed in lowest position, upper bed rails up. Pt was updated on plan of care for the day. Will continue to monitor.

## 2020-12-17 PROBLEM — R19.7 DIARRHEA OF PRESUMED INFECTIOUS ORIGIN: Status: ACTIVE | Noted: 2020-01-01

## 2020-12-17 NOTE — ASSESSMENT & PLAN NOTE
Reports diarrhea started last night.  He reports eating foul food PTA.  I doubt this is C dif.  Monitor.  Encourage PO intake.

## 2020-12-17 NOTE — PROGRESS NOTES
Spoke with Dr. Beauchamp. Informed him patient has become altered and disoriented to self, time and place. MD believes this is secondary to his neurological history. MD wants to continue to monitor.

## 2020-12-17 NOTE — PROGRESS NOTES
HOSPITAL MEDICINE PROGRESS NOTE    Date of service  12/17/2020    Chief Complaint  No chief complaint on file.      Hospital Course:     65 years old man with cardiac history, risk factor presented with left-sided weakness secondary to acute CVA.  CT angio of the head and neck show occluded right MCA with 50% stenosis of the basilar artery.   On HD#3, develop fever with CXR consistent with PNA, started on abx.         Interval History Updates:  No event overnight.  Afebrile.        Consultants/Specialty  Neurology    Review of Systems   Review of Systems   Constitutional: Negative for chills and fever.   HENT: Negative for ear pain and sore throat.    Eyes: Negative for blurred vision.   Respiratory: Negative for shortness of breath.    Cardiovascular: Negative for chest pain, palpitations and leg swelling.   Gastrointestinal: Negative for abdominal pain, blood in stool, constipation, diarrhea, heartburn, melena, nausea and vomiting.   Genitourinary: Negative for dysuria, hematuria and urgency.   Musculoskeletal: Negative for myalgias.   Skin: Negative for rash.   Neurological: Negative for dizziness, focal weakness, weakness and headaches.   Endo/Heme/Allergies: Negative.    Psychiatric/Behavioral: Negative for depression.   All other systems reviewed and are negative.       Medications:  • calcium carbonate  1,000 mg TID PRN   • cefTRIAXone (ROCEPHIN) IV  1 g Q24HRS   • ticagrelor  90 mg BID   • dabigatran  150 mg BID   • lisinopril  20 mg Q DAY   • amLODIPine  10 mg Q DAY   • morphine injection  2 mg Q4HRS PRN   • melatonin  5 mg HS PRN   • labetalol  10 mg Q4HRS PRN   • atorvastatin  80 mg Q EVENING   • tizanidine  4 mg Q6HRS PRN   • acetaminophen  650 mg Q4HRS PRN         •  cefTRIAXone (ROCEPHIN) IV, 1 g, Intravenous, Q24HRS    •  ticagrelor, 90 mg, Oral, BID    •  dabigatran, 150 mg, Oral, BID    •  lisinopril, 20 mg, Oral, Q DAY    •  amLODIPine, 10 mg, Oral, Q DAY    •  atorvastatin, 80 mg, Oral, Q  EVENING        Physical Exam   Vitals:    12/16/20 1948 12/16/20 2349 12/17/20 0457 12/17/20 0700   BP: 118/79 121/90 123/75 110/69   Pulse: 90 86 94 92   Resp: 17 18 (!) 21 17   Temp: 37.6 °C (99.7 °F) 36.9 °C (98.4 °F) 37.2 °C (98.9 °F) 36.7 °C (98 °F)   TempSrc: Oral Temporal Temporal Temporal   SpO2: 90% 92% 92% 92%   Weight: 73.2 kg (161 lb 6 oz)      Height:           Physical Exam   Constitutional: He is oriented to person, place, and time and well-developed, well-nourished, and in no distress. No distress.   HENT:   Head: Normocephalic and atraumatic.   Eyes: Pupils are equal, round, and reactive to light. Conjunctivae are normal. No scleral icterus.   Neck: Normal range of motion. Neck supple. No JVD present.   Cardiovascular: Normal rate, regular rhythm and normal heart sounds. Exam reveals no gallop and no friction rub.   No murmur heard.  Pulmonary/Chest: Effort normal and breath sounds normal. No respiratory distress. He has no wheezes. He has no rales. He exhibits no tenderness.   Abdominal: Soft. Bowel sounds are normal. There is no abdominal tenderness. There is no rebound.   Musculoskeletal: Normal range of motion.         General: No tenderness, deformity or edema.   Neurological: He is alert and oriented to person, place, and time.   Skin: Skin is warm and dry. No rash noted. He is not diaphoretic. No erythema.   Psychiatric: Mood and affect normal.   Nursing note and vitals reviewed.         Laboratory   Recent Labs     12/15/20  0423 12/16/20  0237 12/17/20  0605   WBC 9.6 8.7 6.2   RBC 4.31* 4.74 4.54*   HEMOGLOBIN 13.6* 15.0 14.4   HEMATOCRIT 40.8* 45.6 42.1   PLATELETCT 131* 133* 149*     Recent Labs     12/14/20 1958 12/16/20  0237 12/17/20  0605   SODIUM 136 133* 133*   POTASSIUM 4.3 4.1 3.8   CHLORIDE 106 100 104   CO2 17* 18* 14*   GLUCOSE 89 88 111*   BUN 19 23* 31*   CREATININE 1.09 1.15 1.25      Recent Labs     12/14/20 1958 12/16/20 0237 12/17/20  0605   ALTSGPT 23  --   --   "  ASTSGOT 19  --   --    ALKPHOSPHAT 84  --   --    TBILIRUBIN 0.4  --   --    GLUCOSE 89 88 111*                Microbiology  Results     Procedure Component Value Units Date/Time    BLOOD CULTURE [[id number]] Collected: 12/15/20 2100    Order Status: Completed Specimen: Blood from Peripheral Updated: 12/17/20 0914     Significant Indicator NEG     Source BLD     Site PERIPHERAL     Culture Result No Growth  Note: Blood cultures are incubated for 5 days and  are monitored continuously.Positive blood cultures  are called to the RN and reported as soon as  they are identified.      Narrative:      Droplet, Contact, and Eye Protection  Per Hospital Policy: Only change Specimen Src: to \"Line\" if  specified by physician order.  Left Hand    BLOOD CULTURE [[id number]] Collected: 12/15/20 2100    Order Status: Completed Specimen: Blood from Peripheral Updated: 12/17/20 0914     Significant Indicator NEG     Source BLD     Site PERIPHERAL     Culture Result No Growth  Note: Blood cultures are incubated for 5 days and  are monitored continuously.Positive blood cultures  are called to the RN and reported as soon as  they are identified.      Narrative:      Droplet, Contact, and Eye Protection  Per Hospital Policy: Only change Specimen Src: to \"Line\" if  specified by physician order.  Right Hand    MRSA By PCR (Amp) [[id number]] Collected: 12/16/20 1621    Order Status: Completed Specimen: Respirate from Nares Updated: 12/16/20 2038     Significant Indicator NEG     Source RESP     Site NARES     MRSA PCR Negative for MRSA by PCR.    Narrative:      Droplet, Contact, and Eye [id number] HOLLY BLADE  Droplet, Contact, and Eye [id number] HOLLY MURGUIA    URINALYSIS [[id number]]  (Abnormal) Collected: 12/15/20 2236    Order Status: Completed Specimen: Urine, Clean Catch Updated: 12/15/20 2318     Color Yellow     Character Clear     Specific Gravity 1.020     Ph 5.5     Glucose Negative mg/dL      Ketones Negative mg/dL      " Protein 100 mg/dL      Bilirubin Negative     Urobilinogen, Urine 0.2     Nitrite Negative     Leukocyte Esterase Negative     Occult Blood Negative     Micro Urine Req Microscopic    Narrative:      Droplet, Contact, and Eye [id number] MARY HEADLEY    BLOOD CULTURE [[id number]]     Order Status: Canceled Specimen: Blood from Peripheral     BLOOD CULTURE [[id number]]     Order Status: Canceled Specimen: Blood from Peripheral     BLOOD CULTURE [[id number]]     Order Status: Canceled Specimen: Blood from Peripheral     BLOOD CULTURE [[id number]]     Order Status: Canceled Specimen: Blood from Peripheral              Labs reviewed by me and noted above.    Radiology  DX-CHEST-PORTABLE (1 VIEW)  Narrative: 12/15/2020 6:38 PM    HISTORY/REASON FOR EXAM:  Fever.    TECHNIQUE/EXAM DESCRIPTION AND NUMBER OF VIEWS:  Single portable view of the chest.    COMPARISON: 2/17/2011    FINDINGS:    The cardiomediastinal silhouette is stable. There are ill-defined airspace opacities in the right upper lobe. No pleural effusion or pneumothorax is identified. There is mild elevation of the right hemidiaphragm. Costophrenic angles are clear.  Impression: Ill-defined airspace opacities in the right upper lobe worrisome for pneumonia.    Mild cardiomegaly.  CT-HEAD W/O  Narrative: 12/15/2020 6:07 PM    HISTORY/REASON FOR EXAM:  Altered mental status.    TECHNIQUE/EXAM DESCRIPTION AND NUMBER OF VIEWS:  CT of the head without contrast.    Contiguous axial sections were obtained from the skull base through the vertex.    Up to date radiation dose reduction adjustments have been utilized to meet ALARA standards for radiation dose reduction.    COMPARISON:  12/15/2020    FINDINGS:    The is no evidence of intraparenchymal, intraventricular and extra-axial hemorrhage. Chronic lacunar infarcts are seen in the right frontoparietal white matter. There is an area of encephalomalacia in the right basal ganglia involving the posterior   insula.  There are periventricular and subcortical white matter changes present.  There is cortical atrophy. The ventricles are stable in size with ex vacuo dilatation of the right lateral ventricle. Atherosclerotic calcification is seen. There is an old   lacunar infarct in the right baldo. There is a mucous retention cyst in the right maxillary sinus. Remaining visualized paranasal sinuses and mastoid air cells are clear. Calvarium is intact.  Impression: Small acute right frontoparietal infarcts were better assessed on same day MRI.    Old lacunar infarcts in the right frontoparietal white matter, basal ganglia, right insula and right baldo.    Atrophy  MR-BRAIN-W/O  Narrative: 12/15/2020 2:56 PM    HISTORY/REASON FOR EXAM:  Stroke, follow up.    TECHNIQUE/EXAM DESCRIPTION:  MRI of the brain without contrast.    T1 sagittal, T2 fast spin-echo axial, T1 coronal, FLAIR coronal, diffusion-weighted and apparent diffusion coefficient (ADC map) axial images were obtained of the whole brain.    The study was performed on a [employer] 1.5 Brooke MRI scanner.    COMPARISON:  CT angiogram 12/15/2020. Brain MRI 2/12/2011    FINDINGS:  Correlating with prior CTA there are findings of right MCA occlusion, possibly subacute or chronic. There are multiple small chronic infarcts and asymmetric volume loss within the right cerebral hemisphere. There is chronic cortical infarcts most   conspicuous involving the posterior superior right temporal lobe and insular ribbon with numerous small white matter infarcts involving the right periventricular white matter in the corona radiata and centrum semiovale. There are also chronic lacunar   infarcts in the bilateral thalami, left basal ganglia and right baldo.    There multifocal small acute right cerebral infarcts with a punctate infarct in the right frontal white matter, small cortical infarcts in the posterior lateral right parietal lobe. There may also be a punctate recent infarct in the left  basal ganglia.    There is generalized cerebral atrophy, more pronounced in the right cerebral hemisphere with ex vacuo dilatation of the right lateral ventricle. There are additional confluent and scattered areas of T2 FLAIR hyperintensity in the cerebral white matter   most likely representing mild to moderate chronic microvascular ischemic changes. There is no acute intracranial hemorrhage or extra-axial fluid collection. No mass effect, midline shift or hydrocephalus.    Small mucous retention cyst in the right maxillary sinus.    Right globe demonstrates prior lens surgery and there is abnormal contour of the right globe.  Impression: 1.  Findings of right MCA occlusion with acute/subacute punctate infarcts in the right frontal white matter and small right parietal region cortical infarcts and several chronic right cerebral infarcts as detailed above.  2.  Probable additional punctate recent left basal ganglia lacunar infarct.  3.  Additional chronic lacunar infarcts in the basal ganglia, thalami and right baldo.  4.  Generalized cerebral atrophy and chronic microvascular ischemic type changes.  5.  No acute intracranial hemorrhage.  EC-ECHOCARDIOGRAM COMPLETE W/O CONT  Transthoracic  Echo Report    Echocardiography Laboratory    CONCLUSIONS  Prior study done 02/12/2011, compared to the report of the study done -   there has been no significant change.   Normal left ventricular systolic function.  Left ventricular ejection fraction is visually estimated to be 65%.  No significant valve abnormalities.   A definite cardiac source for a systemic thromboembolic event is not   identified, failure to do so does not exclude its presence. If   clinically indicated, a transesophageal study would be useful.     HA CEJA  Exam Date:         12/15/2020                      09:55  Exam Location:     Inpatient  Priority:          Routine    Ordering Physician:        PRATIK GARCIA  Referring Physician:        [id number], [facility]  Sonographer:               Aisha Ledesma [facility]    Age:    65     Gender:    M  MRN:    [id number]  :    1955  BSA:    1.79   Ht (in):    66     Wt (lb):    155  Exam Type:     Complete    Indications:     CVA  ICD Codes:       436    CPT Codes:       99433    BP:   141    /   95     HR:   73  Technical Quality:       Fair    MEASUREMENTS  (Male / Female) Normal Values  2D ECHO  LV Diastolic Diameter PLAX        2.8 cm                4.2 - 5.9 / 3.9 - 5.3   cm  LV Systolic Diameter PLAX         1.4 cm                2.1 - 4.0 cm  IVS Diastolic Thickness           1.1 cm                  LVPW Diastolic Thickness          1.2 cm                  LVOT Diameter                     2.2 cm                  Estimated LV Ejection Fraction    65 %                    LV Ejection Fraction MOD BP       47.2 %                >= 55  %  LV Ejection Fraction MOD 4C       56.8 %                  LV Ejection Fraction MOD 2C       31.4 %                  IVC Diameter                      1.3 cm                    DOPPLER  AV Peak Velocity                  1.5 m/s                 AV Peak Gradient                  8.7 mmHg                AV Mean Gradient                  4.9 mmHg                LVOT Peak Velocity                1.3 m/s                 AV Area Cont Eq vti               3.3 cm2                 Mitral E Point Velocity           0.57 m/s                Mitral E to A Ratio               0.53                    MV Pressure Half Time             75.3 ms                 MV Area PHT                       2.9 cm2                 MV Deceleration Time              260 ms                    * Indicates values subject to auto-interpretation  LV EF:  65    %    FINDINGS  Left Ventricle  Normal left ventricular chamber size. Mild concentric left ventricular   hypertrophy. Normal left ventricular systolic function. Left   ventricular ejection fraction is visually estimated to be 65%. Normal   regional wall motion.  Normal diastolic function.    Right Ventricle  The right ventricle was normal in size and function.    Right Atrium  The right atrium is normal in size.    Left Atrium  The left atrium is normal in size.  Left atrial volume index is 21   mL/sq m.    Mitral Valve  Structurally normal mitral valve without significant stenosis or   regurgitation.    Aortic Valve  Structurally normal aortic valve without significant stenosis or   regurgitation.    Tricuspid Valve  Structurally normal tricuspid valve without significant stenosis or   regurgitation.    Pulmonic Valve  Structurally normal pulmonic valve without significant stenosis or   regurgitation.    Pericardium  Normal pericardium without effusion.    Aorta  The aortic root is normal.  Ascending aorta diameter is 2.6 cm.    Randall Shepherd M.D.  (Electronically Signed)  Final Date:     15 December 2020                   11:55  CT-CTA NECK WITH & W/O-POST PROCESSING  Narrative: 12/15/2020 12:57 AM    HISTORY/REASON FOR EXAM:  Stroke, follow up    TECHNIQUE/EXAM DESCRIPTION:  CT angiogram of the neck with contrast.    Postcontrast images were obtained of the neck from the great vessels through the skull base following the power injection of nonionic contrast at 5.0 mL/sec. Thin-section helical images were obtained with overlapping reconstruction interval. Coronal and   oblique multiplanar volume reformats were generated.    Cervical internal carotid artery percent stenosis is calculated using the standard method according to the NASCET criteria wherein a segment of uniform caliber mid or distal cervical internal carotid is used as the reference denominator.    3D angiographic images were reviewed on PACS.  Maximum intensity projection (MIP) images were generated and reviewed    80 mL of Omnipaque 350 nonionic contrast was injected intravenously.    Low dose optimization technique was utilized for this CT exam including automated exposure control and adjustment of the mA  and/or kV according to patient size.    COMPARISON: None    FINDINGS:    The arch origins of the great vessels appear intact. The aortic arch shows no abnormality.    Scattered atherosclerotic changes are seen with less than 50% stenosis, otherwise the right common carotid artery, cervical carotid bifurcation, and cervical internal carotid artery are within normal limits. There is no evidence of significant stenosis,   thrombus, or other filling defect or ulceration. There is no evidence of dissection or aneurysm.    Scattered atherosclerotic changes are seen with less than 50% stenosis, otherwise the left common carotid artery, cervical carotid bifurcation, and cervical internal carotid artery are within normal limits. There is no evidence of significant stenosis,   thrombus, or other filling defect or ulceration. There is no evidence of dissection or aneurysm.    The cervical vertebral arteries are normal bilaterally.    Lung apices in the visualized field of view demonstrate reticular opacity in the right upper lobe.    See dedicated CT angiogram of the head for intracranial finding    3D angiographic/MIP images of the vasculature confirm the vascular findings as described above.  Impression: 1.  No high-grade stenosis, aneurysm, dissection, or occlusion. See dedicated CT angiogram the head for intracranial findings  2.  Reticular opacities in the right upper lobe, appearance suggests changes of Covid infiltrate.  CT-CTA HEAD WITH & W/O-POST PROCESS  Narrative: 12/15/2020 12:57 AM    HISTORY/REASON FOR EXAM:  Stroke, follow up    TECHNIQUE/EXAM DESCRIPTION:    CT angiogram of the [place] without and with contrast.  Initial precontrast images were obtained of the head from the skull base through the vertex.  Postcontrast images were obtained of the [place] following the power injection of   nonionic contrast at 5.0 mL/sec. Thin-section helical images were obtained with overlapping reconstruction  interval. Coronal and sagittal multiplanar volume reformats were generated.  3D angiographic images were reviewed on PACS.  Maximum intensity   projection (MIP) images were generated and reviewed.    80 mL of Omnipaque 350 nonionic contrast was injected intravenously.    Low dose optimization technique was utilized for this CT exam including automated exposure control and adjustment of the mA and/or kV according to patient size.    COMPARISON:  None.    FINDINGS:    The posterior circulation shows the distal vertebral arteries to be patent. The vertebrobasilar confluence is intact. The basilar artery is patent but narrowed 50% distally. No aneurysm or occlusive lesion is evident.    Atherosclerotic changes of the bilateral intracranial internal carotid arteries are seen with less than 50% stenosis. There is occlusion of the right M1 segment with poor opacification of the remaining right total cerebral arterial tree.    Moderate diffuse parenchymal volume loss is seen. Scattered periventricular and subcortical white matter low-attenuation changes are observed. Low-density encephalomalacia changes in the right frontal and parietal lobe is seen. Mild bilateral ventricular   dilatation seen, right greater than left.    3D angiographic/MIP images of the vasculature confirm the vascular findings as described above.  Impression: 1.  Occlusion at the right M1 segment with no significant reconstitution to the right middle cerebral artery distribution  2.  50% narrowing of the distal basilar artery  3.  Changes of atrophy with nonspecific white matter changes most commonly associated with small vessel ischemia.  4.  Right frontal and parietal encephalomalacia.    These findings were discussed with the patient's clinician, Dr. Galindo, on 12/15/2020 3:14 AM.      No results found for this or any previous visit.       Assessment and Plan      Active Problems:    CVA (cerebrovascular accident) (HCC) POA: Yes    Hypertension POA:  Yes    Hyperlipemia POA: Yes    COVID-19 virus infection POA: Yes    Coronary artery disease involving native coronary artery of native heart POA: Yes    Pneumonia due to infectious organism POA: No    Diarrhea of presumed infectious origin POA: No  Resolved Problems:    * No resolved hospital problems. *      Reports diarrhea started last night.  He reports eating foul food PTA.  I doubt this is C dif.  Monitor.  Encourage PO intake.    Cont CFTX for PNA.  Stop vanc as MRSA screen NEG.  Fever seems to resolved.  Monitor.    Inflammatory markers unimpressive from a COVID-19 POV.    Cont Brilinta, Pradaxa for his CVA, source suspect embolic.  Cont amlodipine, lisinopril for HTN  Continue supportive care, aspirin.  Lipitor 80 mg p.o. daily.  PT, OT, speech pathology eval.    DVT prophylaxis: SCD and Fully Anticoagulated.    CODE STATUS:  FULL CODE    Disposition: He says that his roomates will not let him return to their home, so he has no place to go after discharge.  Discussed with MSW to find alternative DC housing.

## 2020-12-17 NOTE — DISCHARGE PLANNING
Anticipated Discharge Disposition: TBD    Action: LSW attempted to call Pt on his cell phone but number says it's disconnected. LSW tried twice to call Pt on his room phone but both times Pt didn't answer. LSW tried one final time and phone line was busy.     Barriers to Discharge: Placement for Pt as he cannot go home to stay with roommates but isn't candidate for Covid housing due to needing HH services.     Plan: LSW to continue trying to reach Pt for assessment and discharge planning.

## 2020-12-17 NOTE — PROGRESS NOTES
Report received from night shift RN. Assumed patient care. No signs of distress at this time. Tele box on and rhythm verified. Safety precautions in place. Call light and personal belongings within reach. Educated patient to use call light if assistance is needed. Will continue to monitor.

## 2020-12-17 NOTE — DISCHARGE PLANNING
Anticipated Discharge Disposition: Home    Action: LSW discussed discharge plan with Pt. His roommates have gone to get tested from Covid and if they are also positive they will allow him to return to the home they share. Pt does not want to go back there if he is going to spread Covid to them as they share spaces like the bathroom and kitchen.     According to pt he has never had O2 or HH services. [facility], but he wants to get over Covid and go home for a bit first. He could not think of anything else he needs at this time except maybe a shower chair. He gets meal on wheels and is able to pay his bills with his social security income.     Barriers to Discharge: Pending roommates Covid tests. Pt won't go back to his house if they are negative because he is afraid he'll make them sick too.     Plan: Continue to communicate with Pt to find out about his roommates tests.

## 2020-12-17 NOTE — PROGRESS NOTES
Informed Dr. Beauchamp that patient has has an incident of hematuria secondary to the condom cath. Informed MD that it was spotting and not consistent.

## 2020-12-18 NOTE — DISCHARGE PLANNING
Anticipated Discharge Disposition: TBD    Action: LSW called [facility] and spoke with Deja in admissions. They did have a referral for Pt and believe he is a good candidate but cannot accept him while he is Covid +. In the future if he tests negative and still has therapy needs he can have a new referral sent and be admitted there.     Barriers to Discharge: Pending roommates test results    Plan: LSW to continue coordinating with Pt for discharge planning.

## 2020-12-18 NOTE — PROGRESS NOTES
"Pt. Called stating that he \"felt like he was going to freak out, heart racing, felling anxious.\" He said it happens \"on the weekends\". He said talking helps make him feel better so we talked about his interest in mechanics. Vitals were stable, no diaphoresis, A&O x 4. Pt calmed down after about 10 minutes.   "

## 2020-12-18 NOTE — PROGRESS NOTES
HOSPITAL MEDICINE PROGRESS NOTE    Date of service  12/18/2020    Chief Complaint  No chief complaint on file.      Hospital Course:     65 years old man with cardiac history, risk factor presented with left-sided weakness secondary to acute CVA.  CT angio of the head and neck show occluded right MCA with 50% stenosis of the basilar artery.   On HD#3, develop fever with CXR consistent with PNA, started on abx.         Interval History Updates:  No event overnight.  Afebrile.    Pt seen with PT.  He walked with FWW more stable today.    Consultants/Specialty  Neurology    Review of Systems   Review of Systems   Constitutional: Negative for chills and fever.   HENT: Negative for ear pain and sore throat.    Eyes: Negative for blurred vision.   Respiratory: Negative for shortness of breath.    Cardiovascular: Negative for chest pain, palpitations and leg swelling.   Gastrointestinal: Negative for abdominal pain, blood in stool, constipation, diarrhea, heartburn, melena, nausea and vomiting.   Genitourinary: Negative for dysuria, hematuria and urgency.   Musculoskeletal: Negative for myalgias.   Skin: Negative for rash.   Neurological: Negative for dizziness, focal weakness, weakness and headaches.   Endo/Heme/Allergies: Negative.    Psychiatric/Behavioral: Negative for depression.   All other systems reviewed and are negative.       Medications:  • calcium carbonate  1,000 mg TID PRN   • cefTRIAXone (ROCEPHIN) IV  1 g Q24HRS   • ticagrelor  90 mg BID   • dabigatran  150 mg BID   • lisinopril  20 mg Q DAY   • amLODIPine  10 mg Q DAY   • morphine injection  2 mg Q4HRS PRN   • melatonin  5 mg HS PRN   • labetalol  10 mg Q4HRS PRN   • atorvastatin  80 mg Q EVENING   • tizanidine  4 mg Q6HRS PRN   • acetaminophen  650 mg Q4HRS PRN         •  cefTRIAXone (ROCEPHIN) IV, 1 g, Intravenous, Q24HRS    •  ticagrelor, 90 mg, Oral, BID    •  dabigatran, 150 mg, Oral, BID    •  lisinopril, 20 mg, Oral, Q DAY    •  amLODIPine, 10 mg,  Oral, Q DAY    •  atorvastatin, 80 mg, Oral, Q EVENING        Physical Exam   Vitals:    12/17/20 1916 12/17/20 2340 12/18/20 0351 12/18/20 0743   BP: 123/69 115/77 110/71 124/92   Pulse: 65 92 66    Resp: 18 18 18 18   Temp: 36.2 °C (97.1 °F) 36.2 °C (97.2 °F) 36.7 °C (98.1 °F) 37 °C (98.6 °F)   TempSrc: Temporal Temporal Temporal Temporal   SpO2: 95% 99% 94% 93%   Weight:       Height:           Physical Exam   Constitutional: He is oriented to person, place, and time and well-developed, well-nourished, and in no distress. No distress.   HENT:   Head: Normocephalic and atraumatic.   Eyes: Pupils are equal, round, and reactive to light. Conjunctivae are normal. No scleral icterus.   Neck: Normal range of motion. Neck supple. No JVD present.   Cardiovascular: Normal rate, regular rhythm and normal heart sounds. Exam reveals no gallop and no friction rub.   No murmur heard.  Pulmonary/Chest: Effort normal and breath sounds normal. No respiratory distress. He has no wheezes. He has no rales. He exhibits no tenderness.   Abdominal: Soft. Bowel sounds are normal. There is no abdominal tenderness. There is no rebound.   Musculoskeletal: Normal range of motion.         General: No tenderness, deformity or edema.   Neurological: He is alert and oriented to person, place, and time. Gait normal. Coordination normal.   Skin: Skin is warm and dry. No rash noted. He is not diaphoretic. No erythema.   Psychiatric: Mood and affect normal.   Nursing note and vitals reviewed.         Laboratory   Recent Labs     12/16/20  0237 12/17/20  0605 12/18/20  0015   WBC 8.7 6.2 5.2   RBC 4.74 4.54* 4.52*   HEMOGLOBIN 15.0 14.4 14.1   HEMATOCRIT 45.6 42.1 42.1   PLATELETCT 133* 149* 150*     Recent Labs     12/16/20  0237 12/17/20  0605 12/18/20  0015   SODIUM 133* 133* 132*   POTASSIUM 4.1 3.8 3.7   CHLORIDE 100 104 106   CO2 18* 14* 18*   GLUCOSE 88 111* 150*   BUN 23* 31* 32*   CREATININE 1.15 1.25 1.22      Recent Labs      "12/16/20  0237 12/17/20  0605 12/18/20  0015   GLUCOSE 88 111* 150*                Microbiology  Results     Procedure Component Value Units Date/Time    BLOOD CULTURE [[id number]] Collected: 12/15/20 2100    Order Status: Completed Specimen: Blood from Peripheral Updated: 12/17/20 0914     Significant Indicator NEG     Source BLD     Site PERIPHERAL     Culture Result No Growth  Note: Blood cultures are incubated for 5 days and  are monitored continuously.Positive blood cultures  are called to the RN and reported as soon as  they are identified.      Narrative:      Droplet, Contact, and Eye Protection  Per Hospital Policy: Only change Specimen Src: to \"Line\" if  specified by physician order.  Left Hand    BLOOD CULTURE [[id number]] Collected: 12/15/20 2100    Order Status: Completed Specimen: Blood from Peripheral Updated: 12/17/20 0914     Significant Indicator NEG     Source BLD     Site PERIPHERAL     Culture Result No Growth  Note: Blood cultures are incubated for 5 days and  are monitored continuously.Positive blood cultures  are called to the RN and reported as soon as  they are identified.      Narrative:      Droplet, Contact, and Eye Protection  Per Hospital Policy: Only change Specimen Src: to \"Line\" if  specified by physician order.  Right Hand    MRSA By PCR (Amp) [[id number]] Collected: 12/16/20 1621    Order Status: Completed Specimen: Respirate from Nares Updated: 12/16/20 2038     Significant Indicator NEG     Source RESP     Site NARES     MRSA PCR Negative for MRSA by PCR.    Narrative:      Droplet, Contact, and Eye [id number] HOLLY BLADE  Droplet, Contact, and Eye [id number] HOLLY LBADE    URINALYSIS [[id number]]  (Abnormal) Collected: 12/15/20 2236    Order Status: Completed Specimen: Urine, Clean Catch Updated: 12/15/20 2318     Color Yellow     Character Clear     Specific Gravity 1.020     Ph 5.5     Glucose Negative mg/dL      Ketones Negative mg/dL      Protein 100 mg/dL      " Bilirubin Negative     Urobilinogen, Urine 0.2     Nitrite Negative     Leukocyte Esterase Negative     Occult Blood Negative     Micro Urine Req Microscopic    Narrative:      Droplet, Contact, and Eye [id number] MARY HEADLEY    BLOOD CULTURE [[id number]]     Order Status: Canceled Specimen: Blood from Peripheral     BLOOD CULTURE [[id number]]     Order Status: Canceled Specimen: Blood from Peripheral     BLOOD CULTURE [[id number]]     Order Status: Canceled Specimen: Blood from Peripheral     BLOOD CULTURE [[id number]]     Order Status: Canceled Specimen: Blood from Peripheral              Labs reviewed by me and noted above.    Radiology  DX-CHEST-PORTABLE (1 VIEW)  Narrative: 12/15/2020 6:38 PM    HISTORY/REASON FOR EXAM:  Fever.    TECHNIQUE/EXAM DESCRIPTION AND NUMBER OF VIEWS:  Single portable view of the chest.    COMPARISON: 2/17/2011    FINDINGS:    The cardiomediastinal silhouette is stable. There are ill-defined airspace opacities in the right upper lobe. No pleural effusion or pneumothorax is identified. There is mild elevation of the right hemidiaphragm. Costophrenic angles are clear.  Impression: Ill-defined airspace opacities in the right upper lobe worrisome for pneumonia.    Mild cardiomegaly.  CT-HEAD W/O  Narrative: 12/15/2020 6:07 PM    HISTORY/REASON FOR EXAM:  Altered mental status.    TECHNIQUE/EXAM DESCRIPTION AND NUMBER OF VIEWS:  CT of the head without contrast.    Contiguous axial sections were obtained from the skull base through the vertex.    Up to date radiation dose reduction adjustments have been utilized to meet ALARA standards for radiation dose reduction.    COMPARISON:  12/15/2020    FINDINGS:    The is no evidence of intraparenchymal, intraventricular and extra-axial hemorrhage. Chronic lacunar infarcts are seen in the right frontoparietal white matter. There is an area of encephalomalacia in the right basal ganglia involving the posterior   insula. There are  periventricular and subcortical white matter changes present.  There is cortical atrophy. The ventricles are stable in size with ex vacuo dilatation of the right lateral ventricle. Atherosclerotic calcification is seen. There is an old   lacunar infarct in the right baldo. There is a mucous retention cyst in the right maxillary sinus. Remaining visualized paranasal sinuses and mastoid air cells are clear. Calvarium is intact.  Impression: Small acute right frontoparietal infarcts were better assessed on same day MRI.    Old lacunar infarcts in the right frontoparietal white matter, basal ganglia, right insula and right baldo.    Atrophy  MR-BRAIN-W/O  Narrative: 12/15/2020 2:56 PM    HISTORY/REASON FOR EXAM:  Stroke, follow up.    TECHNIQUE/EXAM DESCRIPTION:  MRI of the brain without contrast.    T1 sagittal, T2 fast spin-echo axial, T1 coronal, FLAIR coronal, diffusion-weighted and apparent diffusion coefficient (ADC map) axial images were obtained of the whole brain.    The study was performed on a [employer] Signa 1.5 Brooke MRI scanner.    COMPARISON:  CT angiogram 12/15/2020. Brain MRI 2/12/2011    FINDINGS:  Correlating with prior CTA there are findings of right MCA occlusion, possibly subacute or chronic. There are multiple small chronic infarcts and asymmetric volume loss within the right cerebral hemisphere. There is chronic cortical infarcts most   conspicuous involving the posterior superior right temporal lobe and insular ribbon with numerous small white matter infarcts involving the right periventricular white matter in the corona radiata and centrum semiovale. There are also chronic lacunar   infarcts in the bilateral thalami, left basal ganglia and right baldo.    There multifocal small acute right cerebral infarcts with a punctate infarct in the right frontal white matter, small cortical infarcts in the posterior lateral right parietal lobe. There may also be a punctate recent infarct in the left basal  ganglia.    There is generalized cerebral atrophy, more pronounced in the right cerebral hemisphere with ex vacuo dilatation of the right lateral ventricle. There are additional confluent and scattered areas of T2 FLAIR hyperintensity in the cerebral white matter   most likely representing mild to moderate chronic microvascular ischemic changes. There is no acute intracranial hemorrhage or extra-axial fluid collection. No mass effect, midline shift or hydrocephalus.    Small mucous retention cyst in the right maxillary sinus.    Right globe demonstrates prior lens surgery and there is abnormal contour of the right globe.  Impression: 1.  Findings of right MCA occlusion with acute/subacute punctate infarcts in the right frontal white matter and small right parietal region cortical infarcts and several chronic right cerebral infarcts as detailed above.  2.  Probable additional punctate recent left basal ganglia lacunar infarct.  3.  Additional chronic lacunar infarcts in the basal ganglia, thalami and right baldo.  4.  Generalized cerebral atrophy and chronic microvascular ischemic type changes.  5.  No acute intracranial hemorrhage.  EC-ECHOCARDIOGRAM COMPLETE W/O CONT  Transthoracic  Echo Report    Echocardiography Laboratory    CONCLUSIONS  Prior study done 02/12/2011, compared to the report of the study done -   there has been no significant change.   Normal left ventricular systolic function.  Left ventricular ejection fraction is visually estimated to be 65%.  No significant valve abnormalities.   A definite cardiac source for a systemic thromboembolic event is not   identified, failure to do so does not exclude its presence. If   clinically indicated, a transesophageal study would be useful.     HA CEJA  Exam Date:         12/15/2020                      09:55  Exam Location:     Inpatient  Priority:          Routine    Ordering Physician:        PRATIK GARCIA  Referring Physician:       [id number],  RADHA  Sonographer:               Aisha Ledesma [facility]    Age:    65     Gender:    M  MRN:    [id number]  :    1955  BSA:    1.79   Ht (in):    66     Wt (lb):    155  Exam Type:     Complete    Indications:     CVA  ICD Codes:       436    CPT Codes:       80768    BP:   141    /   95     HR:   73  Technical Quality:       Fair    MEASUREMENTS  (Male / Female) Normal Values  2D ECHO  LV Diastolic Diameter PLAX        2.8 cm                4.2 - 5.9 / 3.9 - 5.3   cm  LV Systolic Diameter PLAX         1.4 cm                2.1 - 4.0 cm  IVS Diastolic Thickness           1.1 cm                  LVPW Diastolic Thickness          1.2 cm                  LVOT Diameter                     2.2 cm                  Estimated LV Ejection Fraction    65 %                    LV Ejection Fraction MOD BP       47.2 %                >= 55  %  LV Ejection Fraction MOD 4C       56.8 %                  LV Ejection Fraction MOD 2C       31.4 %                  IVC Diameter                      1.3 cm                    DOPPLER  AV Peak Velocity                  1.5 m/s                 AV Peak Gradient                  8.7 mmHg                AV Mean Gradient                  4.9 mmHg                LVOT Peak Velocity                1.3 m/s                 AV Area Cont Eq vti               3.3 cm2                 Mitral E Point Velocity           0.57 m/s                Mitral E to A Ratio               0.53                    MV Pressure Half Time             75.3 ms                 MV Area PHT                       2.9 cm2                 MV Deceleration Time              260 ms                    * Indicates values subject to auto-interpretation  LV EF:  65    %    FINDINGS  Left Ventricle  Normal left ventricular chamber size. Mild concentric left ventricular   hypertrophy. Normal left ventricular systolic function. Left   ventricular ejection fraction is visually estimated to be 65%. Normal   regional wall motion. Normal  diastolic function.    Right Ventricle  The right ventricle was normal in size and function.    Right Atrium  The right atrium is normal in size.    Left Atrium  The left atrium is normal in size.  Left atrial volume index is 21   mL/sq m.    Mitral Valve  Structurally normal mitral valve without significant stenosis or   regurgitation.    Aortic Valve  Structurally normal aortic valve without significant stenosis or   regurgitation.    Tricuspid Valve  Structurally normal tricuspid valve without significant stenosis or   regurgitation.    Pulmonic Valve  Structurally normal pulmonic valve without significant stenosis or   regurgitation.    Pericardium  Normal pericardium without effusion.    Aorta  The aortic root is normal.  Ascending aorta diameter is 2.6 cm.    Randall Shepherd M.D.  (Electronically Signed)  Final Date:     15 December 2020                   11:55  CT-CTA NECK WITH & W/O-POST PROCESSING  Narrative: 12/15/2020 12:57 AM    HISTORY/REASON FOR EXAM:  Stroke, follow up    TECHNIQUE/EXAM DESCRIPTION:  CT angiogram of the neck with contrast.    Postcontrast images were obtained of the neck from the great vessels through the skull base following the power injection of nonionic contrast at 5.0 mL/sec. Thin-section helical images were obtained with overlapping reconstruction interval. Coronal and   oblique multiplanar volume reformats were generated.    Cervical internal carotid artery percent stenosis is calculated using the standard method according to the NASCET criteria wherein a segment of uniform caliber mid or distal cervical internal carotid is used as the reference denominator.    3D angiographic images were reviewed on PACS.  Maximum intensity projection (MIP) images were generated and reviewed    80 mL of Omnipaque 350 nonionic contrast was injected intravenously.    Low dose optimization technique was utilized for this CT exam including automated exposure control and adjustment of the mA and/or  kV according to patient size.    COMPARISON: None    FINDINGS:    The arch origins of the great vessels appear intact. The aortic arch shows no abnormality.    Scattered atherosclerotic changes are seen with less than 50% stenosis, otherwise the right common carotid artery, cervical carotid bifurcation, and cervical internal carotid artery are within normal limits. There is no evidence of significant stenosis,   thrombus, or other filling defect or ulceration. There is no evidence of dissection or aneurysm.    Scattered atherosclerotic changes are seen with less than 50% stenosis, otherwise the left common carotid artery, cervical carotid bifurcation, and cervical internal carotid artery are within normal limits. There is no evidence of significant stenosis,   thrombus, or other filling defect or ulceration. There is no evidence of dissection or aneurysm.    The cervical vertebral arteries are normal bilaterally.    Lung apices in the visualized field of view demonstrate reticular opacity in the right upper lobe.    See dedicated CT angiogram of the head for intracranial finding    3D angiographic/MIP images of the vasculature confirm the vascular findings as described above.  Impression: 1.  No high-grade stenosis, aneurysm, dissection, or occlusion. See dedicated CT angiogram the head for intracranial findings  2.  Reticular opacities in the right upper lobe, appearance suggests changes of Covid infiltrate.  CT-CTA HEAD WITH & W/O-POST PROCESS  Narrative: 12/15/2020 12:57 AM    HISTORY/REASON FOR EXAM:  Stroke, follow up    TECHNIQUE/EXAM DESCRIPTION:    CT angiogram of the [place] without and with contrast.  Initial precontrast images were obtained of the head from the skull base through the vertex.  Postcontrast images were obtained of the [place] following the power injection of   nonionic contrast at 5.0 mL/sec. Thin-section helical images were obtained with overlapping reconstruction  interval. Coronal and sagittal multiplanar volume reformats were generated.  3D angiographic images were reviewed on PACS.  Maximum intensity   projection (MIP) images were generated and reviewed.    80 mL of Omnipaque 350 nonionic contrast was injected intravenously.    Low dose optimization technique was utilized for this CT exam including automated exposure control and adjustment of the mA and/or kV according to patient size.    COMPARISON:  None.    FINDINGS:    The posterior circulation shows the distal vertebral arteries to be patent. The vertebrobasilar confluence is intact. The basilar artery is patent but narrowed 50% distally. No aneurysm or occlusive lesion is evident.    Atherosclerotic changes of the bilateral intracranial internal carotid arteries are seen with less than 50% stenosis. There is occlusion of the right M1 segment with poor opacification of the remaining right total cerebral arterial tree.    Moderate diffuse parenchymal volume loss is seen. Scattered periventricular and subcortical white matter low-attenuation changes are observed. Low-density encephalomalacia changes in the right frontal and parietal lobe is seen. Mild bilateral ventricular   dilatation seen, right greater than left.    3D angiographic/MIP images of the vasculature confirm the vascular findings as described above.  Impression: 1.  Occlusion at the right M1 segment with no significant reconstitution to the right middle cerebral artery distribution  2.  50% narrowing of the distal basilar artery  3.  Changes of atrophy with nonspecific white matter changes most commonly associated with small vessel ischemia.  4.  Right frontal and parietal encephalomalacia.    These findings were discussed with the patient's clinician, Dr. Galindo, on 12/15/2020 3:14 AM.      No results found for this or any previous visit.       Assessment and Plan      Active Problems:    CVA (cerebrovascular accident) (HCC) POA: Yes    Hypertension POA:  Yes    Hyperlipemia POA: Yes    COVID-19 virus infection POA: Yes    Coronary artery disease involving native coronary artery of native heart POA: Yes    Pneumonia due to infectious organism POA: No    Diarrhea of presumed infectious origin POA: No  Resolved Problems:    * No resolved hospital problems. *      Cont CFTX for PNA for today, then switch to Augmetin tomorrow AM for another 4 days for a 7-day course.  He is afebrile for 48 hours.  Monitor.    Inflammatory markers unimpressive from a COVID-19 POV.    Cont Brilinta, Pradaxa for his CVA, source suspect embolic.  Cont amlodipine, lisinopril for HTN  Continue supportive care, aspirin.  Lipitor 80 mg p.o. daily.  PT, OT, speech pathology eval and treat.      DVT prophylaxis: SCD and Fully Anticoagulated.    CODE STATUS:  FULL CODE    Disposition: He says that his roomates will not let him return to their home, so he has no place to go after discharge.  Discussed with MSW to find alternative DC housing.  Pt reports that his roommates are testing for SarCoV2.

## 2020-12-18 NOTE — PROGRESS NOTES
Assumed care of PT A&O 4. Pt resting in bed with no signs of labored breathing. On RA. Q4 neuro checks. Tele monitor in place, cardiac rhythm being monitored. Call light within reach, bed in lowest position, upper bed rails up. Pt was updated on plan of care for the day. Will continue to monitor.

## 2020-12-18 NOTE — THERAPY
Occupational Therapy  Daily Treatment     Patient Name: Emeterio Christianson  Age:  65 y.o., Sex:  male  Medical Record #: [id number]  Today's Date: 12/18/2020     Precautions  Precautions: (P) Fall Risk  Comments: hx of CVA with residual L-sided deficits    Assessment     Pt is at or near his/her functional baseline. Pt with no further skilled OT needs in the acute care setting at this time.     Plan    Continue current treatment plan.       Discharge Recommendations: (P) Anticipate that the patient will have no further occupational therapy needs after discharge from the hospital       12/18/20 0755   Activities of Daily Living   Grooming Supervision   Upper Body Dressing Supervision   Lower Body Dressing Supervision   Functional Mobility   Sit to Stand Supervised   Bed, Chair, Wheelchair Transfer Supervised   Short Term Goals   Short Term Goal # 1 supervised with LB dressing   Goal Outcome # 1 Goal met

## 2020-12-19 NOTE — PROGRESS NOTES
HOSPITAL MEDICINE PROGRESS NOTE    Date of service  12/19/2020    Chief Complaint  No chief complaint on file.      Hospital Course:     65 years old man with cardiac history, risk factor presented with left-sided weakness secondary to acute CVA.  CT angio of the head and neck show occluded right MCA with 50% stenosis of the basilar artery.   On HD#3, develop fever with CXR consistent with PNA, started on abx.         Interval History Updates:  No event overnight.  Afebrile.      Consultants/Specialty  Neurology    Review of Systems   Review of Systems   Constitutional: Negative for chills and fever.   HENT: Negative for ear pain and sore throat.    Eyes: Negative for blurred vision.   Respiratory: Negative for shortness of breath.    Cardiovascular: Negative for chest pain, palpitations and leg swelling.   Gastrointestinal: Negative for abdominal pain, blood in stool, constipation, diarrhea, heartburn, melena, nausea and vomiting.   Genitourinary: Negative for dysuria, hematuria and urgency.   Musculoskeletal: Negative for myalgias.   Skin: Negative for rash.   Neurological: Negative for dizziness, focal weakness, weakness and headaches.   Endo/Heme/Allergies: Negative.    Psychiatric/Behavioral: Negative for depression.   All other systems reviewed and are negative.       Medications:  • amoxicillin-clavulanate  1 Tab Q12HRS   • acetaminophen  650 mg Q4HRS PRN   • calcium carbonate  1,000 mg TID PRN   • ticagrelor  90 mg BID   • dabigatran  150 mg BID   • lisinopril  20 mg Q DAY   • amLODIPine  10 mg Q DAY   • morphine injection  2 mg Q4HRS PRN   • melatonin  5 mg HS PRN   • labetalol  10 mg Q4HRS PRN   • atorvastatin  80 mg Q EVENING   • tizanidine  4 mg Q6HRS PRN         •  amoxicillin-clavulanate, 1 Tab, Oral, Q12HRS    •  ticagrelor, 90 mg, Oral, BID    •  dabigatran, 150 mg, Oral, BID    •  lisinopril, 20 mg, Oral, Q DAY    •  amLODIPine, 10 mg, Oral, Q DAY    •  atorvastatin, 80 mg, Oral, Q  EVENING        Physical Exam   Vitals:    12/18/20 1935 12/18/20 2350 12/19/20 0457 12/19/20 0800   BP: 114/71 100/64 111/70 106/69   Pulse: (!) 106 89 99 (!) 108   Resp: 18 18 17 18   Temp: 37.3 °C (99.1 °F) 37.1 °C (98.7 °F) 37.3 °C (99.2 °F) 37.3 °C (99.2 °F)   TempSrc: Temporal Temporal Temporal Temporal   SpO2: 90% 93% 96% 94%   Weight:       Height:           Physical Exam   Constitutional: He is oriented to person, place, and time and well-developed, well-nourished, and in no distress. No distress.   HENT:   Head: Normocephalic and atraumatic.   Eyes: Pupils are equal, round, and reactive to light. Conjunctivae are normal. No scleral icterus.   Neck: Normal range of motion. Neck supple. No JVD present.   Cardiovascular: Normal rate, regular rhythm and normal heart sounds. Exam reveals no gallop and no friction rub.   No murmur heard.  Pulmonary/Chest: Effort normal and breath sounds normal. No respiratory distress. He has no wheezes. He has no rales. He exhibits no tenderness.   Abdominal: Soft. Bowel sounds are normal. There is no abdominal tenderness. There is no rebound.   Musculoskeletal: Normal range of motion.         General: No tenderness, deformity or edema.   Neurological: He is alert and oriented to person, place, and time. Gait normal. Coordination normal.   Skin: Skin is warm and dry. No rash noted. He is not diaphoretic. No erythema.   Psychiatric: Mood and affect normal.   Nursing note and vitals reviewed.         Laboratory   Recent Labs     12/17/20  0605 12/18/20  0015 12/19/20  0549   WBC 6.2 5.2 5.2   RBC 4.54* 4.52* 4.73   HEMOGLOBIN 14.4 14.1 14.4   HEMATOCRIT 42.1 42.1 45.0   PLATELETCT 149* 150* 168     Recent Labs     12/17/20  0605 12/18/20  0015 12/19/20  0549   SODIUM 133* 132* 132*   POTASSIUM 3.8 3.7 4.6   CHLORIDE 104 106 101   CO2 14* 18* 19*   GLUCOSE 111* 150* 106*   BUN 31* 32* 25*   CREATININE 1.25 1.22 1.38      Recent Labs     12/17/20  0605 12/18/20  0015 12/19/20  0549  "  GLUCOSE 111* 150* 106*                Microbiology  Results     Procedure Component Value Units Date/Time    BLOOD CULTURE [[id number]] Collected: 12/15/20 2100    Order Status: Completed Specimen: Blood from Peripheral Updated: 12/17/20 0914     Significant Indicator NEG     Source BLD     Site PERIPHERAL     Culture Result No Growth  Note: Blood cultures are incubated for 5 days and  are monitored continuously.Positive blood cultures  are called to the RN and reported as soon as  they are identified.      Narrative:      Droplet, Contact, and Eye Protection  Per Hospital Policy: Only change Specimen Src: to \"Line\" if  specified by physician order.  Left Hand    BLOOD CULTURE [[id number]] Collected: 12/15/20 2100    Order Status: Completed Specimen: Blood from Peripheral Updated: 12/17/20 0914     Significant Indicator NEG     Source BLD     Site PERIPHERAL     Culture Result No Growth  Note: Blood cultures are incubated for 5 days and  are monitored continuously.Positive blood cultures  are called to the RN and reported as soon as  they are identified.      Narrative:      Droplet, Contact, and Eye Protection  Per Hospital Policy: Only change Specimen Src: to \"Line\" if  specified by physician order.  Right Hand    MRSA By PCR (Amp) [[id number]] Collected: 12/16/20 1621    Order Status: Completed Specimen: Respirate from Nares Updated: 12/16/20 2038     Significant Indicator NEG     Source RESP     Site NARES     MRSA PCR Negative for MRSA by PCR.    Narrative:      Droplet, Contact, and Eye [id number] HOLLY MURGUIA  Droplet, Contact, and Eye [id number] HOLLY MURGUIA    URINALYSIS [[id number]]  (Abnormal) Collected: 12/15/20 2236    Order Status: Completed Specimen: Urine, Clean Catch Updated: 12/15/20 2318     Color Yellow     Character Clear     Specific Gravity 1.020     Ph 5.5     Glucose Negative mg/dL      Ketones Negative mg/dL      Protein 100 mg/dL      Bilirubin Negative     Urobilinogen, Urine 0.2    "  Nitrite Negative     Leukocyte Esterase Negative     Occult Blood Negative     Micro Urine Req Microscopic    Narrative:      Droplet, Contact, and Eye [id number] AMRY HEADLEY    BLOOD CULTURE [[id number]]     Order Status: Canceled Specimen: Blood from Peripheral     BLOOD CULTURE [[id number]]     Order Status: Canceled Specimen: Blood from Peripheral     BLOOD CULTURE [[id number]]     Order Status: Canceled Specimen: Blood from Peripheral     BLOOD CULTURE [[id number]]     Order Status: Canceled Specimen: Blood from Peripheral              Labs reviewed by me and noted above.    Radiology  DX-CHEST-PORTABLE (1 VIEW)  Narrative: 12/15/2020 6:38 PM    HISTORY/REASON FOR EXAM:  Fever.    TECHNIQUE/EXAM DESCRIPTION AND NUMBER OF VIEWS:  Single portable view of the chest.    COMPARISON: 2/17/2011    FINDINGS:    The cardiomediastinal silhouette is stable. There are ill-defined airspace opacities in the right upper lobe. No pleural effusion or pneumothorax is identified. There is mild elevation of the right hemidiaphragm. Costophrenic angles are clear.  Impression: Ill-defined airspace opacities in the right upper lobe worrisome for pneumonia.    Mild cardiomegaly.  CT-HEAD W/O  Narrative: 12/15/2020 6:07 PM    HISTORY/REASON FOR EXAM:  Altered mental status.    TECHNIQUE/EXAM DESCRIPTION AND NUMBER OF VIEWS:  CT of the head without contrast.    Contiguous axial sections were obtained from the skull base through the vertex.    Up to date radiation dose reduction adjustments have been utilized to meet ALARA standards for radiation dose reduction.    COMPARISON:  12/15/2020    FINDINGS:    The is no evidence of intraparenchymal, intraventricular and extra-axial hemorrhage. Chronic lacunar infarcts are seen in the right frontoparietal white matter. There is an area of encephalomalacia in the right basal ganglia involving the posterior   insula. There are periventricular and subcortical white matter changes present.   There is cortical atrophy. The ventricles are stable in size with ex vacuo dilatation of the right lateral ventricle. Atherosclerotic calcification is seen. There is an old   lacunar infarct in the right baldo. There is a mucous retention cyst in the right maxillary sinus. Remaining visualized paranasal sinuses and mastoid air cells are clear. Calvarium is intact.  Impression: Small acute right frontoparietal infarcts were better assessed on same day MRI.    Old lacunar infarcts in the right frontoparietal white matter, basal ganglia, right insula and right baldo.    Atrophy  MR-BRAIN-W/O  Narrative: 12/15/2020 2:56 PM    HISTORY/REASON FOR EXAM:  Stroke, follow up.    TECHNIQUE/EXAM DESCRIPTION:  MRI of the brain without contrast.    T1 sagittal, T2 fast spin-echo axial, T1 coronal, FLAIR coronal, diffusion-weighted and apparent diffusion coefficient (ADC map) axial images were obtained of the whole brain.    The study was performed on a [employer] 1.5 Brooke MRI scanner.    COMPARISON:  CT angiogram 12/15/2020. Brain MRI 2/12/2011    FINDINGS:  Correlating with prior CTA there are findings of right MCA occlusion, possibly subacute or chronic. There are multiple small chronic infarcts and asymmetric volume loss within the right cerebral hemisphere. There is chronic cortical infarcts most   conspicuous involving the posterior superior right temporal lobe and insular ribbon with numerous small white matter infarcts involving the right periventricular white matter in the corona radiata and centrum semiovale. There are also chronic lacunar   infarcts in the bilateral thalami, left basal ganglia and right baldo.    There multifocal small acute right cerebral infarcts with a punctate infarct in the right frontal white matter, small cortical infarcts in the posterior lateral right parietal lobe. There may also be a punctate recent infarct in the left basal ganglia.    There is generalized cerebral atrophy, more pronounced in  the right cerebral hemisphere with ex vacuo dilatation of the right lateral ventricle. There are additional confluent and scattered areas of T2 FLAIR hyperintensity in the cerebral white matter   most likely representing mild to moderate chronic microvascular ischemic changes. There is no acute intracranial hemorrhage or extra-axial fluid collection. No mass effect, midline shift or hydrocephalus.    Small mucous retention cyst in the right maxillary sinus.    Right globe demonstrates prior lens surgery and there is abnormal contour of the right globe.  Impression: 1.  Findings of right MCA occlusion with acute/subacute punctate infarcts in the right frontal white matter and small right parietal region cortical infarcts and several chronic right cerebral infarcts as detailed above.  2.  Probable additional punctate recent left basal ganglia lacunar infarct.  3.  Additional chronic lacunar infarcts in the basal ganglia, thalami and right baldo.  4.  Generalized cerebral atrophy and chronic microvascular ischemic type changes.  5.  No acute intracranial hemorrhage.  EC-ECHOCARDIOGRAM COMPLETE W/O CONT  Transthoracic  Echo Report    Echocardiography Laboratory    CONCLUSIONS  Prior study done 02/12/2011, compared to the report of the study done -   there has been no significant change.   Normal left ventricular systolic function.  Left ventricular ejection fraction is visually estimated to be 65%.  No significant valve abnormalities.   A definite cardiac source for a systemic thromboembolic event is not   identified, failure to do so does not exclude its presence. If   clinically indicated, a transesophageal study would be useful.     HA CEJA  Exam Date:         12/15/2020                      09:55  Exam Location:     Inpatient  Priority:          Routine    Ordering Physician:        PRATIK GARCIA  Referring Physician:       [id number] Meraz  Sonographer:               [facility]    Age:    65      Gender:    M  MRN:    [id number]  :    1955  BSA:    1.79   Ht (in):    66     Wt (lb):    155  Exam Type:     Complete    Indications:     CVA  ICD Codes:       436    CPT Codes:       77843    BP:   141    /   95     HR:   73  Technical Quality:       Fair    MEASUREMENTS  (Male / Female) Normal Values  2D ECHO  LV Diastolic Diameter PLAX        2.8 cm                4.2 - 5.9 / 3.9 - 5.3   cm  LV Systolic Diameter PLAX         1.4 cm                2.1 - 4.0 cm  IVS Diastolic Thickness           1.1 cm                  LVPW Diastolic Thickness          1.2 cm                  LVOT Diameter                     2.2 cm                  Estimated LV Ejection Fraction    65 %                    LV Ejection Fraction MOD BP       47.2 %                >= 55  %  LV Ejection Fraction MOD 4C       56.8 %                  LV Ejection Fraction MOD 2C       31.4 %                  IVC Diameter                      1.3 cm                    DOPPLER  AV Peak Velocity                  1.5 m/s                 AV Peak Gradient                  8.7 mmHg                AV Mean Gradient                  4.9 mmHg                LVOT Peak Velocity                1.3 m/s                 AV Area Cont Eq vti               3.3 cm2                 Mitral E Point Velocity           0.57 m/s                Mitral E to A Ratio               0.53                    MV Pressure Half Time             75.3 ms                 MV Area PHT                       2.9 cm2                 MV Deceleration Time              260 ms                    * Indicates values subject to auto-interpretation  LV EF:  65    %    FINDINGS  Left Ventricle  Normal left ventricular chamber size. Mild concentric left ventricular   hypertrophy. Normal left ventricular systolic function. Left   ventricular ejection fraction is visually estimated to be 65%. Normal   regional wall motion. Normal diastolic function.    Right Ventricle  The right ventricle was normal in  size and function.    Right Atrium  The right atrium is normal in size.    Left Atrium  The left atrium is normal in size.  Left atrial volume index is 21   mL/sq m.    Mitral Valve  Structurally normal mitral valve without significant stenosis or   regurgitation.    Aortic Valve  Structurally normal aortic valve without significant stenosis or   regurgitation.    Tricuspid Valve  Structurally normal tricuspid valve without significant stenosis or   regurgitation.    Pulmonic Valve  Structurally normal pulmonic valve without significant stenosis or   regurgitation.    Pericardium  Normal pericardium without effusion.    Aorta  The aortic root is normal.  Ascending aorta diameter is 2.6 cm.    Randall Shepherd M.D.  (Electronically Signed)  Final Date:     15 December 2020                   11:55  CT-CTA NECK WITH & W/O-POST PROCESSING  Narrative: 12/15/2020 12:57 AM    HISTORY/REASON FOR EXAM:  Stroke, follow up    TECHNIQUE/EXAM DESCRIPTION:  CT angiogram of the neck with contrast.    Postcontrast images were obtained of the neck from the great vessels through the skull base following the power injection of nonionic contrast at 5.0 mL/sec. Thin-section helical images were obtained with overlapping reconstruction interval. Coronal and   oblique multiplanar volume reformats were generated.    Cervical internal carotid artery percent stenosis is calculated using the standard method according to the NASCET criteria wherein a segment of uniform caliber mid or distal cervical internal carotid is used as the reference denominator.    3D angiographic images were reviewed on PACS.  Maximum intensity projection (MIP) images were generated and reviewed    80 mL of Omnipaque 350 nonionic contrast was injected intravenously.    Low dose optimization technique was utilized for this CT exam including automated exposure control and adjustment of the mA and/or kV according to patient size.    COMPARISON: None    FINDINGS:    The arch  origins of the great vessels appear intact. The aortic arch shows no abnormality.    Scattered atherosclerotic changes are seen with less than 50% stenosis, otherwise the right common carotid artery, cervical carotid bifurcation, and cervical internal carotid artery are within normal limits. There is no evidence of significant stenosis,   thrombus, or other filling defect or ulceration. There is no evidence of dissection or aneurysm.    Scattered atherosclerotic changes are seen with less than 50% stenosis, otherwise the left common carotid artery, cervical carotid bifurcation, and cervical internal carotid artery are within normal limits. There is no evidence of significant stenosis,   thrombus, or other filling defect or ulceration. There is no evidence of dissection or aneurysm.    The cervical vertebral arteries are normal bilaterally.    Lung apices in the visualized field of view demonstrate reticular opacity in the right upper lobe.    See dedicated CT angiogram of the head for intracranial finding    3D angiographic/MIP images of the vasculature confirm the vascular findings as described above.  Impression: 1.  No high-grade stenosis, aneurysm, dissection, or occlusion. See dedicated CT angiogram the head for intracranial findings  2.  Reticular opacities in the right upper lobe, appearance suggests changes of Covid infiltrate.  CT-CTA HEAD WITH & W/O-POST PROCESS  Narrative: 12/15/2020 12:57 AM    HISTORY/REASON FOR EXAM:  Stroke, follow up    TECHNIQUE/EXAM DESCRIPTION:    CT angiogram of the [place] without and with contrast.  Initial precontrast images were obtained of the head from the skull base through the vertex.  Postcontrast images were obtained of the [place] following the power injection of   nonionic contrast at 5.0 mL/sec. Thin-section helical images were obtained with overlapping reconstruction interval. Coronal and sagittal multiplanar volume reformats were generated.  3D  angiographic images were reviewed on PACS.  Maximum intensity   projection (MIP) images were generated and reviewed.    80 mL of Omnipaque 350 nonionic contrast was injected intravenously.    Low dose optimization technique was utilized for this CT exam including automated exposure control and adjustment of the mA and/or kV according to patient size.    COMPARISON:  None.    FINDINGS:    The posterior circulation shows the distal vertebral arteries to be patent. The vertebrobasilar confluence is intact. The basilar artery is patent but narrowed 50% distally. No aneurysm or occlusive lesion is evident.    Atherosclerotic changes of the bilateral intracranial internal carotid arteries are seen with less than 50% stenosis. There is occlusion of the right M1 segment with poor opacification of the remaining right total cerebral arterial tree.    Moderate diffuse parenchymal volume loss is seen. Scattered periventricular and subcortical white matter low-attenuation changes are observed. Low-density encephalomalacia changes in the right frontal and parietal lobe is seen. Mild bilateral ventricular   dilatation seen, right greater than left.    3D angiographic/MIP images of the vasculature confirm the vascular findings as described above.  Impression: 1.  Occlusion at the right M1 segment with no significant reconstitution to the right middle cerebral artery distribution  2.  50% narrowing of the distal basilar artery  3.  Changes of atrophy with nonspecific white matter changes most commonly associated with small vessel ischemia.  4.  Right frontal and parietal encephalomalacia.    These findings were discussed with the patient's clinician, Dr. Galindo, on 12/15/2020 3:14 AM.      No results found for this or any previous visit.       Assessment and Plan      Active Problems:    CVA (cerebrovascular accident) (HCC) POA: Yes    Hypertension POA: Yes    Hyperlipemia POA: Yes    COVID-19 virus infection POA: Yes    Coronary  artery disease involving native coronary artery of native heart POA: Yes    Pneumonia due to infectious organism POA: No    Diarrhea of presumed infectious origin POA: No  Resolved Problems:    * No resolved hospital problems. *      Start Augmetin today for another 4 days for a 7-day course of abx.  He is afebrile for 72 hours.  Monitor.    Inflammatory markers unimpressive from a COVID-19 POV.    Cont Brilinta, Pradaxa for his CVA, source suspect embolic.  Cont amlodipine, lisinopril for HTN  Continue supportive care, aspirin.  Lipitor 80 mg p.o. daily.  PT, OT, speech pathology eval and treat.      DVT prophylaxis: SCD and Fully Anticoagulated.    CODE STATUS:  FULL CODE    Disposition: He says that his roomates will not let him return to their home, so he has no place to go after discharge.  Discussed with MSW to find alternative DC housing.  Pt reports that his roommates are testing for SarCoV2.  Possibly return home with roommates?

## 2020-12-19 NOTE — PROGRESS NOTES
Assumed patient care. Patient in distress. Patient A&Ox2. Not oriented to date. On RA. Tele box on. Safety precautions in place. Call light and personal belongings within reach. Educated to call for assistance.

## 2020-12-20 NOTE — PROGRESS NOTES
I received page from RN that patient found to have confusion.  His vital signs are stable except tachycardia.  I requested RN to check his blood glucose.  I ordered ammonia, TSH and vitamin B12.  Ordered EKG due to tachycardia.  His lab work-up CBC and BMP did not show any acute abnormalities.  Found to have mild leukopenia and mild hyponatremia.    5:39 AM Addendum :    I received page from RN that patient found to have fever.  I ordered blood culture.  His fever could be secondary to COVID-19.  RN reported that his symptoms has slightly improved.

## 2020-12-20 NOTE — PROGRESS NOTES
"CNA reported to this nurse that patient reported he wanted to die and wanted the CNA to kill him. MD notified and ordering psych consult. [place] suicide scale done on patient with a moderate risk score. When asked patient if he had any thoughts about killing himself the patient stated \"I j just want someone to talk to. I was told I didn't need to have suicidal thoughts to call the hotline\".   "

## 2020-12-20 NOTE — PROGRESS NOTES
"Patient called this nurse and stated \"I need you to get my brother out of here he is trying to kill me\". When entering the patients room the patient was laying backwards in bed and stated that his tele box was a bomb. Patient is able to state he is at the hospital, who he is, and what year it is. MD notified at this time.   "

## 2020-12-20 NOTE — PROGRESS NOTES
HOSPITAL MEDICINE PROGRESS NOTE    Date of service  12/20/2020    Chief Complaint  No chief complaint on file.      Hospital Course:     65 years old man with cardiac history, risk factor presented with left-sided weakness secondary to acute CVA.  CT angio of the head and neck show occluded right MCA with 50% stenosis of the basilar artery.   On HD#3, develop fever with CXR consistent with PNA, started on abx.         Interval History Updates:  Fever to 38.5.      Consultants/Specialty  Neurology    Review of Systems   Review of Systems   Constitutional: Negative for chills and fever.   HENT: Negative for ear pain and sore throat.    Eyes: Negative for blurred vision.   Respiratory: Negative for shortness of breath.    Cardiovascular: Negative for chest pain, palpitations and leg swelling.   Gastrointestinal: Negative for abdominal pain, blood in stool, constipation, diarrhea, heartburn, melena, nausea and vomiting.   Genitourinary: Negative for dysuria, hematuria and urgency.   Musculoskeletal: Negative for myalgias.   Skin: Negative for rash.   Neurological: Negative for dizziness, focal weakness, weakness and headaches.   Endo/Heme/Allergies: Negative.    Psychiatric/Behavioral: Negative for depression.   All other systems reviewed and are negative.       Medications:  • amoxicillin-clavulanate  1 Tab Q12HRS   • acetaminophen  650 mg Q4HRS PRN   • calcium carbonate  1,000 mg TID PRN   • ticagrelor  90 mg BID   • dabigatran  150 mg BID   • lisinopril  20 mg Q DAY   • amLODIPine  10 mg Q DAY   • melatonin  5 mg HS PRN   • labetalol  10 mg Q4HRS PRN   • atorvastatin  80 mg Q EVENING   • tizanidine  4 mg Q6HRS PRN         •  amoxicillin-clavulanate, 1 Tab, Oral, Q12HRS    •  ticagrelor, 90 mg, Oral, BID    •  dabigatran, 150 mg, Oral, BID    •  lisinopril, 20 mg, Oral, Q DAY    •  amLODIPine, 10 mg, Oral, Q DAY    •  atorvastatin, 80 mg, Oral, Q EVENING        Physical Exam   Vitals:    12/20/20 0415 12/20/20 0600  12/20/20 0714 12/20/20 0724   BP:  120/76 (!) 90/52 109/70   Pulse:  (!) 112 75 (!) 110   Resp:   20 20   Temp: (!) 38.5 °C (101.3 °F) 37 °C (98.6 °F) 36 °C (96.8 °F) 36.2 °C (97.2 °F)   TempSrc: Temporal Temporal Temporal Temporal   SpO2:   91% 96%   Weight:       Height:           Physical Exam   Constitutional: He is oriented to person, place, and time and well-developed, well-nourished, and in no distress. No distress.   HENT:   Head: Normocephalic and atraumatic.   Eyes: Pupils are equal, round, and reactive to light. Conjunctivae are normal. No scleral icterus.   Neck: Normal range of motion. Neck supple. No JVD present.   Cardiovascular: Normal rate, regular rhythm and normal heart sounds. Exam reveals no gallop and no friction rub.   No murmur heard.  Pulmonary/Chest: Effort normal and breath sounds normal. No respiratory distress. He has no wheezes. He has no rales. He exhibits no tenderness.   Abdominal: Soft. Bowel sounds are normal. There is no abdominal tenderness. There is no rebound.   Musculoskeletal: Normal range of motion.         General: No tenderness, deformity or edema.   Neurological: He is alert and oriented to person, place, and time. Gait normal. Coordination normal.   Skin: Skin is warm and dry. No rash noted. He is not diaphoretic. No erythema.   Psychiatric: Mood and affect normal.   Nursing note and vitals reviewed.         Laboratory   Recent Labs     12/18/20 0015 12/19/20  0549 12/20/20  0045   WBC 5.2 5.2 4.1*   RBC 4.52* 4.73 4.50*   HEMOGLOBIN 14.1 14.4 13.8*   HEMATOCRIT 42.1 45.0 42.2   PLATELETCT 150* 168 149*     Recent Labs     12/18/20  0015 12/19/20  0549 12/20/20  0045   SODIUM 132* 132* 131*   POTASSIUM 3.7 4.6 4.5   CHLORIDE 106 101 101   CO2 18* 19* 18*   GLUCOSE 150* 106* 97   BUN 32* 25* 34*   CREATININE 1.22 1.38 1.39      Recent Labs     12/18/20 0015 12/19/20  0549 12/20/20  0045   GLUCOSE 150* 106* 97                Microbiology  Results     Procedure Component  "Value Units Date/Time    Blood Culture [[id number]]     Order Status: Sent Specimen: Blood from Peripheral     BLOOD CULTURE [[id number]] Collected: 12/15/20 2100    Order Status: Completed Specimen: Blood from Peripheral Updated: 12/17/20 0914     Significant Indicator NEG     Source BLD     Site PERIPHERAL     Culture Result No Growth  Note: Blood cultures are incubated for 5 days and  are monitored continuously.Positive blood cultures  are called to the RN and reported as soon as  they are identified.      Narrative:      Droplet, Contact, and Eye Protection  Per Hospital Policy: Only change Specimen Src: to \"Line\" if  specified by physician order.  Left Hand    BLOOD CULTURE [[id number]] Collected: 12/15/20 2100    Order Status: Completed Specimen: Blood from Peripheral Updated: 12/17/20 0914     Significant Indicator NEG     Source BLD     Site PERIPHERAL     Culture Result No Growth  Note: Blood cultures are incubated for 5 days and  are monitored continuously.Positive blood cultures  are called to the RN and reported as soon as  they are identified.      Narrative:      Droplet, Contact, and Eye Protection  Per Hospital Policy: Only change Specimen Src: to \"Line\" if  specified by physician order.  Right Hand    MRSA By PCR (Amp) [[id number]] Collected: 12/16/20 1621    Order Status: Completed Specimen: Respirate from Nares Updated: 12/16/20 2038     Significant Indicator NEG     Source RESP     Site NARES     MRSA PCR Negative for MRSA by PCR.    Narrative:      Droplet, Contact, and Eye [id number] [place]  Droplet, Contact, and Eye [id number] [place]    URINALYSIS [[id number]]  (Abnormal) Collected: 12/15/20 2236    Order Status: Completed Specimen: Urine, Clean Catch Updated: 12/15/20 2318     Color Yellow     Character Clear     Specific Gravity 1.020     Ph 5.5     Glucose Negative mg/dL      Ketones Negative mg/dL      Protein 100 mg/dL      Bilirubin Negative     Urobilinogen, Urine 0.2     " Nitrite Negative     Leukocyte Esterase Negative     Occult Blood Negative     Micro Urine Req Microscopic    Narrative:      Droplet, Contact, and Eye [id number] MARY HEADLEY    BLOOD CULTURE [[id number]]     Order Status: Canceled Specimen: Blood from Peripheral     BLOOD CULTURE [[id number]]     Order Status: Canceled Specimen: Blood from Peripheral     BLOOD CULTURE [[id number]]     Order Status: Canceled Specimen: Blood from Peripheral     BLOOD CULTURE [[id number]]     Order Status: Canceled Specimen: Blood from Peripheral              Labs reviewed by me and noted above.    Radiology  DX-CHEST-PORTABLE (1 VIEW)  Narrative: 12/15/2020 6:38 PM    HISTORY/REASON FOR EXAM:  Fever.    TECHNIQUE/EXAM DESCRIPTION AND NUMBER OF VIEWS:  Single portable view of the chest.    COMPARISON: 2/17/2011    FINDINGS:    The cardiomediastinal silhouette is stable. There are ill-defined airspace opacities in the right upper lobe. No pleural effusion or pneumothorax is identified. There is mild elevation of the right hemidiaphragm. Costophrenic angles are clear.  Impression: Ill-defined airspace opacities in the right upper lobe worrisome for pneumonia.    Mild cardiomegaly.  CT-HEAD W/O  Narrative: 12/15/2020 6:07 PM    HISTORY/REASON FOR EXAM:  Altered mental status.    TECHNIQUE/EXAM DESCRIPTION AND NUMBER OF VIEWS:  CT of the head without contrast.    Contiguous axial sections were obtained from the skull base through the vertex.    Up to date radiation dose reduction adjustments have been utilized to meet ALARA standards for radiation dose reduction.    COMPARISON:  12/15/2020    FINDINGS:    The is no evidence of intraparenchymal, intraventricular and extra-axial hemorrhage. Chronic lacunar infarcts are seen in the right frontoparietal white matter. There is an area of encephalomalacia in the right basal ganglia involving the posterior   insula. There are periventricular and subcortical white matter changes present.   There is cortical atrophy. The ventricles are stable in size with ex vacuo dilatation of the right lateral ventricle. Atherosclerotic calcification is seen. There is an old   lacunar infarct in the right baldo. There is a mucous retention cyst in the right maxillary sinus. Remaining visualized paranasal sinuses and mastoid air cells are clear. Calvarium is intact.  Impression: Small acute right frontoparietal infarcts were better assessed on same day MRI.    Old lacunar infarcts in the right frontoparietal white matter, basal ganglia, right insula and right baldo.    Atrophy  MR-BRAIN-W/O  Narrative: 12/15/2020 2:56 PM    HISTORY/REASON FOR EXAM:  Stroke, follow up.    TECHNIQUE/EXAM DESCRIPTION:  MRI of the brain without contrast.    T1 sagittal, T2 fast spin-echo axial, T1 coronal, FLAIR coronal, diffusion-weighted and apparent diffusion coefficient (ADC map) axial images were obtained of the whole brain.    The study was performed on a [employer] 1.5 Brooke MRI scanner.    COMPARISON:  CT angiogram 12/15/2020. Brain MRI 2/12/2011    FINDINGS:  Correlating with prior CTA there are findings of right MCA occlusion, possibly subacute or chronic. There are multiple small chronic infarcts and asymmetric volume loss within the right cerebral hemisphere. There is chronic cortical infarcts most   conspicuous involving the posterior superior right temporal lobe and insular ribbon with numerous small white matter infarcts involving the right periventricular white matter in the corona radiata and centrum semiovale. There are also chronic lacunar   infarcts in the bilateral thalami, left basal ganglia and right baldo.    There multifocal small acute right cerebral infarcts with a punctate infarct in the right frontal white matter, small cortical infarcts in the posterior lateral right parietal lobe. There may also be a punctate recent infarct in the left basal ganglia.    There is generalized cerebral atrophy, more pronounced in  the right cerebral hemisphere with ex vacuo dilatation of the right lateral ventricle. There are additional confluent and scattered areas of T2 FLAIR hyperintensity in the cerebral white matter   most likely representing mild to moderate chronic microvascular ischemic changes. There is no acute intracranial hemorrhage or extra-axial fluid collection. No mass effect, midline shift or hydrocephalus.    Small mucous retention cyst in the right maxillary sinus.    Right globe demonstrates prior lens surgery and there is abnormal contour of the right globe.  Impression: 1.  Findings of right MCA occlusion with acute/subacute punctate infarcts in the right frontal white matter and small right parietal region cortical infarcts and several chronic right cerebral infarcts as detailed above.  2.  Probable additional punctate recent left basal ganglia lacunar infarct.  3.  Additional chronic lacunar infarcts in the basal ganglia, thalami and right baldo.  4.  Generalized cerebral atrophy and chronic microvascular ischemic type changes.  5.  No acute intracranial hemorrhage.  EC-ECHOCARDIOGRAM COMPLETE W/O CONT  Transthoracic  Echo Report    Echocardiography Laboratory    CONCLUSIONS  Prior study done 02/12/2011, compared to the report of the study done -   there has been no significant change.   Normal left ventricular systolic function.  Left ventricular ejection fraction is visually estimated to be 65%.  No significant valve abnormalities.   A definite cardiac source for a systemic thromboembolic event is not   identified, failure to do so does not exclude its presence. If   clinically indicated, a transesophageal study would be useful.     HA CEJA  Exam Date:         12/15/2020                      09:55  Exam Location:     Inpatient  Priority:          Routine    Ordering Physician:        PRATIK GARCIA  Referring Physician:       [id number] Meraz  Sonographer:               Aisha Ledesma [facility]    Age:    65      Gender:    M  MRN:    [id number]  :    1955  BSA:    1.79   Ht (in):    66     Wt (lb):    155  Exam Type:     Complete    Indications:     CVA  ICD Codes:       436    CPT Codes:       99184    BP:   141    /   95     HR:   73  Technical Quality:       Fair    MEASUREMENTS  (Male / Female) Normal Values  2D ECHO  LV Diastolic Diameter PLAX        2.8 cm                4.2 - 5.9 / 3.9 - 5.3   cm  LV Systolic Diameter PLAX         1.4 cm                2.1 - 4.0 cm  IVS Diastolic Thickness           1.1 cm                  LVPW Diastolic Thickness          1.2 cm                  LVOT Diameter                     2.2 cm                  Estimated LV Ejection Fraction    65 %                    LV Ejection Fraction MOD BP       47.2 %                >= 55  %  LV Ejection Fraction MOD 4C       56.8 %                  LV Ejection Fraction MOD 2C       31.4 %                  IVC Diameter                      1.3 cm                    DOPPLER  AV Peak Velocity                  1.5 m/s                 AV Peak Gradient                  8.7 mmHg                AV Mean Gradient                  4.9 mmHg                LVOT Peak Velocity                1.3 m/s                 AV Area Cont Eq vti               3.3 cm2                 Mitral E Point Velocity           0.57 m/s                Mitral E to A Ratio               0.53                    MV Pressure Half Time             75.3 ms                 MV Area PHT                       2.9 cm2                 MV Deceleration Time              260 ms                    * Indicates values subject to auto-interpretation  LV EF:  65    %    FINDINGS  Left Ventricle  Normal left ventricular chamber size. Mild concentric left ventricular   hypertrophy. Normal left ventricular systolic function. Left   ventricular ejection fraction is visually estimated to be 65%. Normal   regional wall motion. Normal diastolic function.    Right Ventricle  The right ventricle was normal in  size and function.    Right Atrium  The right atrium is normal in size.    Left Atrium  The left atrium is normal in size.  Left atrial volume index is 21   mL/sq m.    Mitral Valve  Structurally normal mitral valve without significant stenosis or   regurgitation.    Aortic Valve  Structurally normal aortic valve without significant stenosis or   regurgitation.    Tricuspid Valve  Structurally normal tricuspid valve without significant stenosis or   regurgitation.    Pulmonic Valve  Structurally normal pulmonic valve without significant stenosis or   regurgitation.    Pericardium  Normal pericardium without effusion.    Aorta  The aortic root is normal.  Ascending aorta diameter is 2.6 cm.    Randall Shepherd M.D.  (Electronically Signed)  Final Date:     15 December 2020                   11:55  CT-CTA NECK WITH & W/O-POST PROCESSING  Narrative: 12/15/2020 12:57 AM    HISTORY/REASON FOR EXAM:  Stroke, follow up    TECHNIQUE/EXAM DESCRIPTION:  CT angiogram of the neck with contrast.    Postcontrast images were obtained of the neck from the great vessels through the skull base following the power injection of nonionic contrast at 5.0 mL/sec. Thin-section helical images were obtained with overlapping reconstruction interval. Coronal and   oblique multiplanar volume reformats were generated.    Cervical internal carotid artery percent stenosis is calculated using the standard method according to the NASCET criteria wherein a segment of uniform caliber mid or distal cervical internal carotid is used as the reference denominator.    3D angiographic images were reviewed on PACS.  Maximum intensity projection (MIP) images were generated and reviewed    80 mL of Omnipaque 350 nonionic contrast was injected intravenously.    Low dose optimization technique was utilized for this CT exam including automated exposure control and adjustment of the mA and/or kV according to patient size.    COMPARISON: None    FINDINGS:    The arch  origins of the great vessels appear intact. The aortic arch shows no abnormality.    Scattered atherosclerotic changes are seen with less than 50% stenosis, otherwise the right common carotid artery, cervical carotid bifurcation, and cervical internal carotid artery are within normal limits. There is no evidence of significant stenosis,   thrombus, or other filling defect or ulceration. There is no evidence of dissection or aneurysm.    Scattered atherosclerotic changes are seen with less than 50% stenosis, otherwise the left common carotid artery, cervical carotid bifurcation, and cervical internal carotid artery are within normal limits. There is no evidence of significant stenosis,   thrombus, or other filling defect or ulceration. There is no evidence of dissection or aneurysm.    The cervical vertebral arteries are normal bilaterally.    Lung apices in the visualized field of view demonstrate reticular opacity in the right upper lobe.    See dedicated CT angiogram of the head for intracranial finding    3D angiographic/MIP images of the vasculature confirm the vascular findings as described above.  Impression: 1.  No high-grade stenosis, aneurysm, dissection, or occlusion. See dedicated CT angiogram the head for intracranial findings  2.  Reticular opacities in the right upper lobe, appearance suggests changes of Covid infiltrate.  CT-CTA HEAD WITH & W/O-POST PROCESS  Narrative: 12/15/2020 12:57 AM    HISTORY/REASON FOR EXAM:  Stroke, follow up    TECHNIQUE/EXAM DESCRIPTION:    CT angiogram of the [place] without and with contrast.  Initial precontrast images were obtained of the head from the skull base through the vertex.  Postcontrast images were obtained of the [place] following the power injection of   nonionic contrast at 5.0 mL/sec. Thin-section helical images were obtained with overlapping reconstruction interval. Coronal and sagittal multiplanar volume reformats were generated.  3D  angiographic images were reviewed on PACS.  Maximum intensity   projection (MIP) images were generated and reviewed.    80 mL of Omnipaque 350 nonionic contrast was injected intravenously.    Low dose optimization technique was utilized for this CT exam including automated exposure control and adjustment of the mA and/or kV according to patient size.    COMPARISON:  None.    FINDINGS:    The posterior circulation shows the distal vertebral arteries to be patent. The vertebrobasilar confluence is intact. The basilar artery is patent but narrowed 50% distally. No aneurysm or occlusive lesion is evident.    Atherosclerotic changes of the bilateral intracranial internal carotid arteries are seen with less than 50% stenosis. There is occlusion of the right M1 segment with poor opacification of the remaining right total cerebral arterial tree.    Moderate diffuse parenchymal volume loss is seen. Scattered periventricular and subcortical white matter low-attenuation changes are observed. Low-density encephalomalacia changes in the right frontal and parietal lobe is seen. Mild bilateral ventricular   dilatation seen, right greater than left.    3D angiographic/MIP images of the vasculature confirm the vascular findings as described above.  Impression: 1.  Occlusion at the right M1 segment with no significant reconstitution to the right middle cerebral artery distribution  2.  50% narrowing of the distal basilar artery  3.  Changes of atrophy with nonspecific white matter changes most commonly associated with small vessel ischemia.  4.  Right frontal and parietal encephalomalacia.    These findings were discussed with the patient's clinician, Dr. Galindo, on 12/15/2020 3:14 AM.    Echo:  Prior study done 02/12/2011, compared to the report of the study done -   there has been no significant change.   Normal left ventricular systolic function.  Left ventricular ejection fraction is visually estimated to be 65%.  No significant  valve abnormalities.   A definite cardiac source for a systemic thromboembolic event is not   identified       Assessment and Plan      Active Problems:    CVA (cerebrovascular accident) (HCC) POA: Yes    Hypertension POA: Yes    Hyperlipemia POA: Yes    COVID-19 virus infection POA: Yes    Coronary artery disease involving native coronary artery of native heart POA: Yes    Pneumonia due to infectious organism POA: No    Diarrhea of presumed infectious origin POA: No  Resolved Problems:    * No resolved hospital problems. *      Continue Augmetin today for another 4 days for a 7-day course of abx.  Was febrile last night.  Monitor.  If febrile again, draw blood cultures X2 peripherally.    Check UA.  Inflammatory markers unimpressive from a COVID-19 POV.    Cont Brilinta, Pradaxa for his CVA, source suspect embolic.  Cont amlodipine, lisinopril for HTN  Continue supportive care, aspirin.  Lipitor 80 mg p.o. daily.  PT, OT, speech pathology eval and treat.      DVT prophylaxis: SCD and Fully Anticoagulated.    CODE STATUS:  FULL CODE    Disposition: He says that his roomates will not let him return to their home, so he has no place to go after discharge.  Discussed with MSW to find alternative DC housing.  Pt reports that his roommates are testing for SarCoV2.  Possibly return home with roommates?

## 2020-12-21 PROBLEM — R45.851 SUICIDAL IDEATION: Status: ACTIVE | Noted: 2020-01-01

## 2020-12-21 PROBLEM — R10.31 RIGHT LOWER QUADRANT ABDOMINAL PAIN: Status: ACTIVE | Noted: 2020-01-01

## 2020-12-21 NOTE — ASSESSMENT & PLAN NOTE
Endorsed thoughts to kill himself 12/20 evening. Was placed on legal hold.  Psychiatry consulted and he was taken off legal hold 12/25

## 2020-12-21 NOTE — PROGRESS NOTES
Patient requested that this nurse call brother Erasto at [phone] to provide an update. No answer, message left and awaiting a call back.

## 2020-12-21 NOTE — ASSESSMENT & PLAN NOTE
Had stents placed in 10/20.  Has been on Brillinta and statin.    Now with Comfort Care status started, will stop these meds

## 2020-12-21 NOTE — PROGRESS NOTES
Report received at bedside from NOC RN, pt care assumed, tele box on. Pt aaox4, no signs of distress noted at this time. Patient resting comfortably in bed. POC discussed with pt and verbalizes no questions. Pt denies any additional needs at this time. Bed in lowest position, pt educated on fall risk and verbalized understanding, call light within reach, bed alarm plugged in and on. Sitter at bedside for legal hold.

## 2020-12-21 NOTE — PROGRESS NOTES
Security present at patient bedside to help transfer patient to private room and lock up patient belongings. Patients phone  and air pods placed in the nurse  locked in the top draw. Per MD patient okay to have phone in order to call significant other.

## 2020-12-21 NOTE — PROGRESS NOTES
Bedside report received from day RN. A&Ox2. PT is on room air. Tele box in place and verified by monitor room. No S/S of pain or discomfort at this time. PT is educated to use call light. Bed is in lowest and locked position. Isolation precautions in place. Will continue plan of care.

## 2020-12-21 NOTE — ASSESSMENT & PLAN NOTE
C/o abdominal pain new on this admission associated with reported diarrhea.  CT Abdomin and Pelvis on 12/21 without etiology  Cdiff negative  Pain improved

## 2020-12-21 NOTE — PROGRESS NOTES
HOSPITAL MEDICINE PROGRESS NOTE    Date of service  12/21/2020    Chief Complaint  Left-side weakness.    Hospital Course:     65 years old man with cardiac history, risk factor presented with left-sided weakness secondary to acute CVA.  CT angio of the head and neck show occluded right MCA with 50% stenosis of the basilar artery.   MRI consistent with stroke, embolic likely.  Underlying etiology due to non-compliance with antiplatelet and anticoagulation therapy PTA.  This has been sorted out [facility].      On HD#3, develop fever with CXR consistent with PNA, started on abx with resolution fo fever.      On HD#7, he endorsed SI.         Interval History Updates:  No event overnight.  Afebrile.     Now c/o abdominal pain with diarrhea last night.    Yesterday, also endorsed suicidal ideation.  So had a sitter instituted.      Consultants/Specialty  Neurology    Review of Systems   Review of Systems   Constitutional: Negative for chills and fever.   Respiratory: Negative for shortness of breath.    Cardiovascular: Negative for chest pain.   Gastrointestinal: Positive for abdominal pain and diarrhea. Negative for blood in stool, melena, nausea and vomiting.   Skin: Negative for rash.   Neurological: Negative for focal weakness and weakness.   Endo/Heme/Allergies: Negative.    Psychiatric/Behavioral: Positive for suicidal ideas. Negative for depression.   All other systems reviewed and are negative.       Medications:  • NS   Continuous   • formulation r   QHS   • HYDROcodone-acetaminophen  1-2 Tab Q6HRS PRN   • amoxicillin-clavulanate  1 Tab Q12HRS   • acetaminophen  650 mg Q4HRS PRN   • calcium carbonate  1,000 mg TID PRN   • ticagrelor  90 mg BID   • dabigatran  150 mg BID   • [Held by provider] lisinopril  20 mg Q DAY   • [Held by provider] amLODIPine  10 mg Q DAY   • melatonin  5 mg HS PRN   • labetalol  10 mg Q4HRS PRN   • atorvastatin  80 mg Q EVENING   • tizanidine  4 mg Q6HRS PRN            Physical Exam   Vitals:    12/20/20 2328 12/20/20 2330 12/21/20 0332 12/21/20 0700   BP: (!) 86/50 (!) 87/50 120/75 117/88   Pulse: (!) 106  63 99   Resp: 18 20 18   Temp: 36.4 °C (97.5 °F)  37.1 °C (98.7 °F) 37.1 °C (98.8 °F)   TempSrc: Temporal  Temporal Oral   SpO2: 90%  91% 96%   Weight:       Height:           Physical Exam   Constitutional: He is oriented to person, place, and time and well-developed, well-nourished, and in no distress. No distress.   HENT:   Head: Normocephalic and atraumatic.   Eyes: Pupils are equal, round, and reactive to light. Conjunctivae are normal. No scleral icterus.   Neck: Normal range of motion. Neck supple. No JVD present.   Cardiovascular: Normal rate, regular rhythm and normal heart sounds. Exam reveals no gallop and no friction rub.   No murmur heard.  Pulmonary/Chest: Effort normal and breath sounds normal. No respiratory distress. He has no wheezes. He has no rales. He exhibits no tenderness.   Abdominal: Soft. There is abdominal tenderness. There is rebound.   Musculoskeletal: Normal range of motion.         General: No tenderness, deformity or edema.   Neurological: He is alert and oriented to person, place, and time. Gait normal. Coordination normal.   Skin: Skin is warm and dry. No rash noted. He is not diaphoretic. No erythema.   Psychiatric: Mood and affect normal.   Nursing note and vitals reviewed.         Laboratory   Recent Labs     12/19/20  0549 12/20/20  0045 12/21/20  0832   WBC 5.2 4.1* 6.2   RBC 4.73 4.50* 4.37*   HEMOGLOBIN 14.4 13.8* 14.0   HEMATOCRIT 45.0 42.2 40.4*   PLATELETCT 168 149* 182     Recent Labs     12/19/20  0549 12/20/20  0045   SODIUM 132* 131*   POTASSIUM 4.6 4.5   CHLORIDE 101 101   CO2 19* 18*   GLUCOSE 106* 97   BUN 25* 34*   CREATININE 1.38 1.39      Recent Labs     12/19/20  0549 12/20/20  0045   GLUCOSE 106* 97                Microbiology  Results     Procedure Component Value Units Date/Time    Blood Culture [[id number]]  "Collected: 12/20/20 1205    Order Status: Completed Specimen: Blood from Peripheral Updated: 12/21/20 0902     Significant Indicator NEG     Source BLD     Site PERIPHERAL     Culture Result No Growth  Note: Blood cultures are incubated for 5 days and  are monitored continuously.Positive blood cultures  are called to the RN and reported as soon as  they are identified.      Narrative:      Droplet, Contact, and Eye Protection  Per Hospital Policy: Only change Specimen Src: to \"Line\" if  specified by physician order.  Right Hand    URINALYSIS [[id number]]  (Abnormal) Collected: 12/21/20 0535    Order Status: Completed Specimen: Urine, Clean Catch Updated: 12/21/20 0711     Color DK Yellow     Character Clear     Specific Gravity 1.023     Ph 5.0     Glucose Negative mg/dL      Ketones Trace mg/dL      Protein 100 mg/dL      Bilirubin Negative     Urobilinogen, Urine 0.2     Nitrite Negative     Leukocyte Esterase Negative     Occult Blood Negative     Micro Urine Req Microscopic    Narrative:      Droplet, Contact, and Eye [id number] ROMARIO GUERIN  L    BLOOD CULTURE [[id number]] Collected: 12/15/20 2100    Order Status: Completed Specimen: Blood from Peripheral Updated: 12/21/20 0500     Significant Indicator NEG     Source BLD     Site PERIPHERAL     Culture Result No growth after 5 days of incubation.    Narrative:      Droplet, Contact, and Eye Protection  Per Hospital Policy: Only change Specimen Src: to \"Line\" if  specified by physician order.  Right Hand    BLOOD CULTURE [[id number]] Collected: 12/15/20 2100    Order Status: Completed Specimen: Blood from Peripheral Updated: 12/21/20 0500     Significant Indicator NEG     Source BLD     Site PERIPHERAL     Culture Result No growth after 5 days of incubation.    Narrative:      Droplet, Contact, and Eye Protection  Per Hospital Policy: Only change Specimen Src: to \"Line\" if  specified by physician order.  Left Hand    MRSA By PCR (Amp) [[id number]] " Collected: 12/16/20 1621    Order Status: Completed Specimen: Respirate from Nares Updated: 12/16/20 2038     Significant Indicator NEG     Source RESP     Site NARES     MRSA PCR Negative for MRSA by PCR.    Narrative:      Droplet, Contact, and Eye [id number] HOLLY BLADE  Droplet, Contact, and Eye [id number] HOLLY MURGUIA    URINALYSIS [[id number]]  (Abnormal) Collected: 12/15/20 2236    Order Status: Completed Specimen: Urine, Clean Catch Updated: 12/15/20 2318     Color Yellow     Character Clear     Specific Gravity 1.020     Ph 5.5     Glucose Negative mg/dL      Ketones Negative mg/dL      Protein 100 mg/dL      Bilirubin Negative     Urobilinogen, Urine 0.2     Nitrite Negative     Leukocyte Esterase Negative     Occult Blood Negative     Micro Urine Req Microscopic    Narrative:      Droplet, Contact, and Eye [id number] MARY HEADLEY    BLOOD CULTURE [[id number]]     Order Status: Canceled Specimen: Blood from Peripheral     BLOOD CULTURE [[id number]]     Order Status: Canceled Specimen: Blood from Peripheral     BLOOD CULTURE [[id number]]     Order Status: Canceled Specimen: Blood from Peripheral     BLOOD CULTURE [[id number]]     Order Status: Canceled Specimen: Blood from Peripheral              Labs reviewed by me and noted above.    Radiology  DX-CHEST-PORTABLE (1 VIEW)  Narrative: 12/15/2020 6:38 PM    HISTORY/REASON FOR EXAM:  Fever.    TECHNIQUE/EXAM DESCRIPTION AND NUMBER OF VIEWS:  Single portable view of the chest.    COMPARISON: 2/17/2011    FINDINGS:    The cardiomediastinal silhouette is stable. There are ill-defined airspace opacities in the right upper lobe. No pleural effusion or pneumothorax is identified. There is mild elevation of the right hemidiaphragm. Costophrenic angles are clear.  Impression: Ill-defined airspace opacities in the right upper lobe worrisome for pneumonia.    Mild cardiomegaly.  CT-HEAD W/O  Narrative: 12/15/2020 6:07 PM    HISTORY/REASON FOR EXAM:  Altered  mental status.    TECHNIQUE/EXAM DESCRIPTION AND NUMBER OF VIEWS:  CT of the head without contrast.    Contiguous axial sections were obtained from the skull base through the vertex.    Up to date radiation dose reduction adjustments have been utilized to meet ALARA standards for radiation dose reduction.    COMPARISON:  12/15/2020    FINDINGS:    The is no evidence of intraparenchymal, intraventricular and extra-axial hemorrhage. Chronic lacunar infarcts are seen in the right frontoparietal white matter. There is an area of encephalomalacia in the right basal ganglia involving the posterior   insula. There are periventricular and subcortical white matter changes present.  There is cortical atrophy. The ventricles are stable in size with ex vacuo dilatation of the right lateral ventricle. Atherosclerotic calcification is seen. There is an old   lacunar infarct in the right baldo. There is a mucous retention cyst in the right maxillary sinus. Remaining visualized paranasal sinuses and mastoid air cells are clear. Calvarium is intact.  Impression: Small acute right frontoparietal infarcts were better assessed on same day MRI.    Old lacunar infarcts in the right frontoparietal white matter, basal ganglia, right insula and right baldo.    Atrophy  MR-BRAIN-W/O  Narrative: 12/15/2020 2:56 PM    HISTORY/REASON FOR EXAM:  Stroke, follow up.    TECHNIQUE/EXAM DESCRIPTION:  MRI of the brain without contrast.    T1 sagittal, T2 fast spin-echo axial, T1 coronal, FLAIR coronal, diffusion-weighted and apparent diffusion coefficient (ADC map) axial images were obtained of the whole brain.    The study was performed on a [employer] Signa 1.5 Brooke MRI scanner.    COMPARISON:  CT angiogram 12/15/2020. Brain MRI 2/12/2011    FINDINGS:  Correlating with prior CTA there are findings of right MCA occlusion, possibly subacute or chronic. There are multiple small chronic infarcts and asymmetric volume loss within the right cerebral hemisphere.  There is chronic cortical infarcts most   conspicuous involving the posterior superior right temporal lobe and insular ribbon with numerous small white matter infarcts involving the right periventricular white matter in the corona radiata and centrum semiovale. There are also chronic lacunar   infarcts in the bilateral thalami, left basal ganglia and right baldo.    There multifocal small acute right cerebral infarcts with a punctate infarct in the right frontal white matter, small cortical infarcts in the posterior lateral right parietal lobe. There may also be a punctate recent infarct in the left basal ganglia.    There is generalized cerebral atrophy, more pronounced in the right cerebral hemisphere with ex vacuo dilatation of the right lateral ventricle. There are additional confluent and scattered areas of T2 FLAIR hyperintensity in the cerebral white matter   most likely representing mild to moderate chronic microvascular ischemic changes. There is no acute intracranial hemorrhage or extra-axial fluid collection. No mass effect, midline shift or hydrocephalus.    Small mucous retention cyst in the right maxillary sinus.    Right globe demonstrates prior lens surgery and there is abnormal contour of the right globe.  Impression: 1.  Findings of right MCA occlusion with acute/subacute punctate infarcts in the right frontal white matter and small right parietal region cortical infarcts and several chronic right cerebral infarcts as detailed above.  2.  Probable additional punctate recent left basal ganglia lacunar infarct.  3.  Additional chronic lacunar infarcts in the basal ganglia, thalami and right baldo.  4.  Generalized cerebral atrophy and chronic microvascular ischemic type changes.  5.  No acute intracranial hemorrhage.  EC-ECHOCARDIOGRAM COMPLETE W/O CONT  Transthoracic  Echo Report    Echocardiography Laboratory    CONCLUSIONS  Prior study done 02/12/2011, compared to the report of the study done -    there has been no significant change.   Normal left ventricular systolic function.  Left ventricular ejection fraction is visually estimated to be 65%.  No significant valve abnormalities.   A definite cardiac source for a systemic thromboembolic event is not   identified, failure to do so does not exclude its presence. If   clinically indicated, a transesophageal study would be useful.     HA CEJA  Exam Date:         12/15/2020                      09:55  Exam Location:     Inpatient  Priority:          Routine    Ordering Physician:        PRATIK GARCIA  Referring Physician:       [id number] Meraz  Sonographer:               Aisha Ledesma [facility]    Age:    65     Gender:    M  MRN:    [id number]  :    1955  BSA:    1.79   Ht (in):    66     Wt (lb):    155  Exam Type:     Complete    Indications:     CVA  ICD Codes:       436    CPT Codes:       77330    BP:   141    /   95     HR:   73  Technical Quality:       Fair    MEASUREMENTS  (Male / Female) Normal Values  2D ECHO  LV Diastolic Diameter PLAX        2.8 cm                4.2 - 5.9 / 3.9 - 5.3   cm  LV Systolic Diameter PLAX         1.4 cm                2.1 - 4.0 cm  IVS Diastolic Thickness           1.1 cm                  LVPW Diastolic Thickness          1.2 cm                  LVOT Diameter                     2.2 cm                  Estimated LV Ejection Fraction    65 %                    LV Ejection Fraction MOD BP       47.2 %                >= 55  %  LV Ejection Fraction MOD 4C       56.8 %                  LV Ejection Fraction MOD 2C       31.4 %                  IVC Diameter                      1.3 cm                    DOPPLER  AV Peak Velocity                  1.5 m/s                 AV Peak Gradient                  8.7 mmHg                AV Mean Gradient                  4.9 mmHg                LVOT Peak Velocity                1.3 m/s                 AV Area Cont Eq vti               3.3 cm2                 Mitral E  Point Velocity           0.57 m/s                Mitral E to A Ratio               0.53                    MV Pressure Half Time             75.3 ms                 MV Area PHT                       2.9 cm2                 MV Deceleration Time              260 ms                    * Indicates values subject to auto-interpretation  LV EF:  65    %    FINDINGS  Left Ventricle  Normal left ventricular chamber size. Mild concentric left ventricular   hypertrophy. Normal left ventricular systolic function. Left   ventricular ejection fraction is visually estimated to be 65%. Normal   regional wall motion. Normal diastolic function.    Right Ventricle  The right ventricle was normal in size and function.    Right Atrium  The right atrium is normal in size.    Left Atrium  The left atrium is normal in size.  Left atrial volume index is 21   mL/sq m.    Mitral Valve  Structurally normal mitral valve without significant stenosis or   regurgitation.    Aortic Valve  Structurally normal aortic valve without significant stenosis or   regurgitation.    Tricuspid Valve  Structurally normal tricuspid valve without significant stenosis or   regurgitation.    Pulmonic Valve  Structurally normal pulmonic valve without significant stenosis or   regurgitation.    Pericardium  Normal pericardium without effusion.    Aorta  The aortic root is normal.  Ascending aorta diameter is 2.6 cm.    Randall Shepherd M.D.  (Electronically Signed)  Final Date:     15 December 2020                   11:55  CT-CTA NECK WITH & W/O-POST PROCESSING  Narrative: 12/15/2020 12:57 AM    HISTORY/REASON FOR EXAM:  Stroke, follow up    TECHNIQUE/EXAM DESCRIPTION:  CT angiogram of the neck with contrast.    Postcontrast images were obtained of the neck from the great vessels through the skull base following the power injection of nonionic contrast at 5.0 mL/sec. Thin-section helical images were obtained with overlapping reconstruction interval. Coronal and    oblique multiplanar volume reformats were generated.    Cervical internal carotid artery percent stenosis is calculated using the standard method according to the NASCET criteria wherein a segment of uniform caliber mid or distal cervical internal carotid is used as the reference denominator.    3D angiographic images were reviewed on PACS.  Maximum intensity projection (MIP) images were generated and reviewed    80 mL of Omnipaque 350 nonionic contrast was injected intravenously.    Low dose optimization technique was utilized for this CT exam including automated exposure control and adjustment of the mA and/or kV according to patient size.    COMPARISON: None    FINDINGS:    The arch origins of the great vessels appear intact. The aortic arch shows no abnormality.    Scattered atherosclerotic changes are seen with less than 50% stenosis, otherwise the right common carotid artery, cervical carotid bifurcation, and cervical internal carotid artery are within normal limits. There is no evidence of significant stenosis,   thrombus, or other filling defect or ulceration. There is no evidence of dissection or aneurysm.    Scattered atherosclerotic changes are seen with less than 50% stenosis, otherwise the left common carotid artery, cervical carotid bifurcation, and cervical internal carotid artery are within normal limits. There is no evidence of significant stenosis,   thrombus, or other filling defect or ulceration. There is no evidence of dissection or aneurysm.    The cervical vertebral arteries are normal bilaterally.    Lung apices in the visualized field of view demonstrate reticular opacity in the right upper lobe.    See dedicated CT angiogram of the head for intracranial finding    3D angiographic/MIP images of the vasculature confirm the vascular findings as described above.  Impression: 1.  No high-grade stenosis, aneurysm, dissection, or occlusion. See dedicated CT angiogram the head for intracranial  findings  2.  Reticular opacities in the right upper lobe, appearance suggests changes of Covid infiltrate.  CT-CTA HEAD WITH & W/O-POST PROCESS  Narrative: 12/15/2020 12:57 AM    HISTORY/REASON FOR EXAM:  Stroke, follow up    TECHNIQUE/EXAM DESCRIPTION:    CT angiogram of the [place] without and with contrast.  Initial precontrast images were obtained of the head from the skull base through the vertex.  Postcontrast images were obtained of the [place] following the power injection of   nonionic contrast at 5.0 mL/sec. Thin-section helical images were obtained with overlapping reconstruction interval. Coronal and sagittal multiplanar volume reformats were generated.  3D angiographic images were reviewed on PACS.  Maximum intensity   projection (MIP) images were generated and reviewed.    80 mL of Omnipaque 350 nonionic contrast was injected intravenously.    Low dose optimization technique was utilized for this CT exam including automated exposure control and adjustment of the mA and/or kV according to patient size.    COMPARISON:  None.    FINDINGS:    The posterior circulation shows the distal vertebral arteries to be patent. The vertebrobasilar confluence is intact. The basilar artery is patent but narrowed 50% distally. No aneurysm or occlusive lesion is evident.    Atherosclerotic changes of the bilateral intracranial internal carotid arteries are seen with less than 50% stenosis. There is occlusion of the right M1 segment with poor opacification of the remaining right total cerebral arterial tree.    Moderate diffuse parenchymal volume loss is seen. Scattered periventricular and subcortical white matter low-attenuation changes are observed. Low-density encephalomalacia changes in the right frontal and parietal lobe is seen. Mild bilateral ventricular   dilatation seen, right greater than left.    3D angiographic/MIP images of the vasculature confirm the vascular findings as described  above.  Impression: 1.  Occlusion at the right M1 segment with no significant reconstitution to the right middle cerebral artery distribution  2.  50% narrowing of the distal basilar artery  3.  Changes of atrophy with nonspecific white matter changes most commonly associated with small vessel ischemia.  4.  Right frontal and parietal encephalomalacia.    These findings were discussed with the patient's clinician, Dr. Galindo, on 12/15/2020 3:14 AM.    Echo:  Prior study done 02/12/2011, compared to the report of the study done -   there has been no significant change.   Normal left ventricular systolic function.  Left ventricular ejection fraction is visually estimated to be 65%.  No significant valve abnormalities.   A definite cardiac source for a systemic thromboembolic event is not   identified       Assessment and Plan    * CVA (cerebrovascular accident) (HCC)- (present on admission)  Assessment & Plan  Likely embolic in etiology.  He was not taking his antiplatelet and anticoagulant PTA.  Neurology saw and signed off.  He is back to his baseline and all appropriate medications were restarted and [facility].  Effient changed to Brilinta.  Pradaxa restarted.  He can not stop taking these two medications unless told otherwise by his doctors.  Cont statin.      Suicidal ideation  Assessment & Plan  Endorsed thoughts to kill himself 12/20 evening.  Sitter.  Psychiatry consult.  He scores moderate risk of SI.      Right lower quadrant abdominal pain  Assessment & Plan  C/o abdominal pain this AM.  Exam very tender, RLQ > LLQ.  Also with reported diarrhea.  He does not recall having his appendix removed.  Order CT abd/pelvis with PO and IV contrast.  CBC without leukocytosis.  Pending CMP.  Check C dif stool.    Pneumonia due to infectious organism  Assessment & Plan  CXR yesterday show RUP infiltrate.  Responded well to Rocephin, then switched to Augmentin for a full 7-day course.      Coronary  artery disease involving native coronary artery of native heart- (present on admission)  Assessment & Plan  Had stents placed in 10/20.  Now on Brillinta instead of Effient given acute stroke.  On statin.      COVID-19 virus infection- (present on admission)  Assessment & Plan  Rather asymptomatic.  Inflammatory markers unimpressive from a COVID-19 POV.  Monitor.  Not requiring O2.  Isolation per protocol.      DIABETES MELLITUS  Assessment & Plan  Patient will be on insulin sliding scale to keep blood sugars less than 180 while hospitalized    Hyperlipemia- (present on admission)  Assessment & Plan  Continue high intensity statins    Hypertension- (present on admission)  Assessment & Plan  On lisinopril, amlodipine.        DVT prophylaxis: SCD and Fully Anticoagulated.    CODE STATUS:  FULL CODE    Disposition: He says that his roomates will not let him return to their home, so he has no place to go after discharge.  Discussed with MSW to find alternative DC housing.  Pt reports that his roommates are testing for SarCoV2.  Possibly return home with roommates?

## 2020-12-22 PROBLEM — J96.01 ACUTE RESPIRATORY FAILURE WITH HYPOXIA (HCC): Status: ACTIVE | Noted: 2020-01-01

## 2020-12-22 PROBLEM — R19.5 POSITIVE FECAL OCCULT BLOOD TEST: Status: ACTIVE | Noted: 2020-01-01

## 2020-12-22 PROBLEM — A41.9 SEPSIS (HCC): Status: ACTIVE | Noted: 2020-01-01

## 2020-12-22 NOTE — PROGRESS NOTES
Report received at bedside from NOC RN, pt care assumed, tele box on. Pt aaox4, patient resting comfortably in bed. POC discussed with pt and verbalizes no questions. Pt denies any additional needs at this time. Bed in lowest position, pt educated on fall risk and verbalized understanding, call light within reach, bed alarm plugged in and on. Sitter at bedside for legal hold.

## 2020-12-22 NOTE — THERAPY
"Speech Language Pathology   Clinical Swallow Evaluation     Patient Name: Emeterio Christianson  AGE:  65 y.o., SEX:  male  Medical Record #: [id number]  Today's Date: 12/22/2020     Precautions  Precautions: Fall Risk, Swallow Precautions ( See Comments)  Comments: hx of CVA with residual L-sided deficits    Assessment  64 YO male admitted 12/14/20 2/2 left-sided weakness. PMHx: right-sided cardiac stent, diabetes, HTN. CMHx:CVA, suicidal ideation, CAD, COVID 19, diabetes, HLD, HTN. CXR 12/22\"Mildly increased R upper lobe opacity and new ill-defined hazy bibasilar opacities consistent with multifocal pneumonia.\" Brain MRI 12/15 \"R MCA occlusion with acute/subacute punctate infarcts in the right frontal white matter and small right parietal region cortical infarcts and several chronic right cerebral infarcts\".    Pt seen this date for clinical swallow evaluation sitting up in chair 2/2 concerns for aspiration. Pt reports right sided neglect from CVA in \"1991\". Of note, pt escalated from room air to 15L Oxymask 12/21/20 in the evening. Oral mech exam revealed limited retraction and protrusion of left sided labial structures and lingual deviation to the right upon protrusion. Dentition intact. PO trials of MTL, puree, minced and moist, soft and bite sized, regular, and thins by cup and straw assessed. Hyolaryngeal elevation palpated as complete, mildly delayed swallow trigger appreciated. Prolonged mastication, increased work of breath, and pt reports of \"difficultt to chew\" appreciated with trials of regular. Wet vocal quality appreciated with trials of thins via straw, pt required max cues to cough and swallow again to clear wet vocal quality. No other s/sx of aspiration appreciated with any other consistencies consumed. Pt required mod vebral cueing to refrain from talking while eating. Pt required assistance across trials to coordinate PO with removal/replacement of Oxymask.    Recommend diet of soft and bite sized/thin " "liquids with adherence to the following: NO STRAWS, upright at 90*, direct supervision during meals, feeding assistance as needed, float meds whole in puree. SLP following.     Plan    Recommend Speech Therapy 3 times per week until therapy goals are met for the following treatments:  Dysphagia Training and Patient / Family / Caregiver Education.    Discharge Recommendations: (P) Recommend post-acute placement for additional speech therapy services prior to discharge home    Subjective    Pt was agitated at beginning but pleasant and cooperative for majority of session. Pt referred to cell phone to determine month and year, and stated he was at \"[facility]\". Pt demonstrating memory deficits, I.e. not remembering thisclinician after this clinician left room for less than 2 minutes.      Objective       12/22/20 1330   Oral Motor Eval    Is Patient Able to Complete Oral Motor Eval Yes but Impaired   Labial Function   Labial Structure At Rest Minimal  (mild left labial droop)   Labial Vowel Production / I /, / U / Minimal   Labial Sequence / I /, / U / Minimal   Lingual Function   Lingual Structure At Rest Within Functional Limits   Lingual Protrude Minimal  (deviation to right)   Lingual Retract Within Functional Limits   Elevate In Mouth Within Functional Limits   Lateralization No Impairment Right;No Impairment Left   Jaw   Bite ([place]) Not Tested   Chew ([place]) Minimal   Velar Function   / A / Prolonged Within Functional Limits   Laryngeal Function   Voice Quality Minimal   Volutional Cough Within Functional Limits   Excursion Upon Swallow Complete   Oral Food Presentation   Ice Chips Not Tested   Single Swallow Mildly Thick (2) - (Nectar Thick)  Minimal   Serial Swallow Mildly Thick (2) - (Nectar Thick) Within Functional Limits   Single Swallow Thin (0) Within Functional Limits   Serial Swallow Thin (0) Minimal  (WVQ w/ straw sips)   Pureed (4) Within Functional Limits   Soft & Bite-Sized (6) - " "(Dysphagia III) Within Functional Limits   Regular (7) Minimal  (prolonged mastication, c/o hard to chew)   Regular-Easy to Chew (7) Not Tested   Self Feeding Needs Assistance   Tracheostomy   Tracheostomy  No   Dysphagia Strategies / Recommendations   Strategies / Interventions Recommended (Yes / No) Yes   Compensatory Strategies Direct Supervision During Meals;Assistance Needed for Meal Tray Set-up;Head of Bed 90 Degrees During Eating / Drinking;No Straws;Single Sips / Bites;No Talking During Eating / Drinking;Cough / Clear Wet Vocal Quality Post Swallow   Diet / Liquid Recommendation Thin (0);Soft & Bite-Sized (6) - (Dysphagia III)   Medication Administration  Float Whole with Puree   Therapy Interventions Dysphagia Therapy By Speech Language Pathologist   Dysphagia Rating   Nutritional Liquid Intake Rating Scale Non thickened beverages   Nutritional Food Intake Rating Scale Total oral diet with multiple consistencies but requiring special preparation or compensations   Patient / Family Goals   Patient / Family Goal #1 \"I want to sit in the chair!\"   Short Term Goals   Short Term Goal # 1 Pt will consume a diet of SB6/TN0 with no s/sx of aspiration and min cues.          "

## 2020-12-22 NOTE — PROGRESS NOTES
Overnight Hospitalist    Was paged to evaluate this patient at approximately 4:15 AM for acute decompensation in his respiratory status requiring 15 LPM of oxygen via nonrebreather.  He has a history of being positive for COVID-19 and a stroke.  He was complaining of reflux.  Given his history of a stroke, suspect aspiration pneumonia may be high on the differential.  Antibiotics were initiated by Dr. Beauchamp after review of this labs and imaging studies.  Please see his note from this morning for further details explanation of escalating care.    He was reevaluated approximately an hour later and appeared to be stable on nonrebreather oxygen 15 LPM.  His chest x-ray showed mildly increased right upper lobe opacities and bibasilar opacities consistent with multifocal pneumonia.  And findings are highly suspect for aspiration pneumonia versus worsening of COVID-19 respiratory infection.

## 2020-12-22 NOTE — PROGRESS NOTES
HOSPITAL MEDICINE PROGRESS NOTE    Time: 4:30 AM 12/22/20    I happen to be reviewing the patient's chart this early morning, saw that he spiked fevers, tachycardic with sudden increase in O2 demand (now 15 L from RA).  /80.  Dr. Alarcon was paged and ordered ABG, CXR.    Patient has PNA and was spiking fevers earlier in the course of his hospitalization.  He defervesced with CFTX, which was transitioned to PO Augmentin on 12/19.  Two days ago, he spiked fever 38.5 once, none since until this morning.  All other VS stable.  Yesterday, he complains of abdominal pain associated with diarrhea, which he had PTA.  CT abd/pelvis negative except confirming loose stool.  C dif stool testing was ordered to rule him out for C dif colitis.      His CRP also higher on repeat this AM, 9.22 from 5 two days ago.  DDimer low.  UA yesterday clean.    Source at this point is likely lungs, GI.  If lungs, could be resistance organism or worsening COVID-19/PNA, or both.  If GI, then C dif colitis, but does not explain the sudden increase in oxygen requirement.    Plan:  Draw blood cultures x2.  ABG as ordered.  CBC, CMP.  CXR.  Trend lactic acid, check pro-calcitonin.  Empiric escalation of antibiotics to Zosyn, vancomycin, and IV Flagyl.  De-escalate as indicated when source is found.  Stool needs to be collected for C dif testing.    I have discontinued his BP meds (amlodipine, ACEI) given his new potential sepsis physiology.        Addendum 5:10 AM  CXR reviewed, now with worsening infiltrate RML, new infiltrate RLL and LLL consistent with multilobar PNA.  Order sputum culture.  Broaden antibiotics as above.  Start dexamethasone 6mg PO BID.  Consider Remdesivir as indicated.      Prelim ABG not so bad.

## 2020-12-22 NOTE — CONSULTS
"PSYCHIATRIC INTAKE EVALUATION    *Reason for admission:  sudden onset left-sided weakness           *Reason for consult:  suicidal     *Requesting Physician/APN: SHARITA Beauchamp MD        Legal Hold status:  on legal hold         *Chief Complaint:: Im not suicidal now, but can't say what's changed or why       *HPI (includes Psychiatric ROS):   66 yo male who says he has been depressed since august of 2020 when \"my world came crashing down\" with 2 myocardial infarctions and 2 strokes. Does not mention having more recent strokes. Then says [facility] and that Im very attractive though he can barely see. Does show a sense of humor. Sleep is down, appetite down with 10 lb loss in 2 months, hopeless but not SI now and can't say why.    Depression : as noted  Anxiety: for many years and worries about everything  Dolores: for 1 week, increased energy with decreased sleep but no bizarre or damaging/regretful behaviors.    Psychosis:denies  Other: pt keeps falling asleep suddenly but can be awoken      *Medical Review Of Symptoms (not dx conditions):   Review of Systems   Constitutional: Positive for malaise/fatigue and weight loss.   HENT: Negative.    Eyes: Negative.    Respiratory: Positive for shortness of breath.    Cardiovascular: Negative.    Gastrointestinal: Negative.    Genitourinary: Negative.    Musculoskeletal: Negative.    Skin: Negative.    Neurological: Positive for weakness. Focal weakness:     Psychiatric/Behavioral: Positive for depression and suicidal ideas. The patient has insomnia.          *Psychiatric Examination:   Vitals:   Vitals:    12/20/20 2330 12/21/20 0332 12/21/20 0700 12/21/20 1524   BP: (!) 87/50 120/75 117/88 129/81   Pulse:  63 99 90   Resp:  20 18 16   Temp:  37.1 °C (98.7 °F) 37.1 °C (98.8 °F) 37.1 °C (98.7 °F)   TempSrc:  Temporal Oral Temporal   SpO2:  91% 96% 98%   Weight:       Height:         General Appearance:  normal habitus, good eye contact and " cooperative  Abnormal Movements: none   Gait and Posture: not observed  Speech: soft  Thought Process: normal rate  Associations:   linear except for once  Abnormal or Psychotic Thoughts: none  Judgement and Insight: fair   Orientation: grossly intact  Recent and Remote Memory: grossly intact  Attention Span and Concentration: diminished  Language:fluent  Fund of Knowledge: not tested  Mood and Affect: depressed and anxious  SI/HI:  suicidal - no and homicidal - no  MMSE score and/or clock drawing:not applicable    *PAST MEDICAL/PSYCH/FAMILY/SOCIAL(as reported by patient):       *medical hx:           Past Medical History:   Diagnosis Date   • A-fib (HCC)    • A-fib (HCC) 09/17/2020   • Backpain    • CAD (coronary artery disease)    • Congestive heart failure ([facility]) 10/08/2020   • Diabetes    • Fall    • Headache    • HTN (hypertension)    • Hypertension    • Myocardial infarct ([facility])     per pt 4 stents    • Neck pain    • Stroke ([facility])      Past Surgical History:   Procedure Laterality Date   • COLONOSCOPY     • FUSION      lumbar   • OTHER ORTHOPEDIC SURGERY          *psychiatric hx:  Denies SAs or hospitalizations. May have been on doxepin, he is unsure.      Guns:does not have any     *family Psych hx:  none     *social hx: has a place to stay  Alcohol: denies   Drugs:THC gummies: help sleep relaxes him     *MEDICAL HX: labs, MARS, medications, etc were reviewed. Only those findings of potential interest to psychiatry are noted below:    *Current Medical issues:   see below     *Allergies:  No Active Allergies   *Current Medications:    Current Facility-Administered Medications:   •  NS infusion, , Intravenous, Continuous, Clementina Beauchamp M.D., Last Rate: 83 mL/hr at 12/21/20 0857, New Bag at 12/21/20 0857  •  formulation r (PREPARATION H) 0.25-14-74.9 % ointment, , Rectal, QHS, Clementina Beauchamp M.D.  •  HYDROcodone-acetaminophen (NORCO) 5-325 MG per tablet 1-2 Tab, 1-2 Tab, Oral, Q6HRS PRN, Clementina Beauchamp M.D., 2 Tab  at 12/21/20 0903  •  amoxicillin-clavulanate (AUGMENTIN) 875-125 MG per tablet 1 Tab, 1 Tab, Oral, Q12HRS, Clementina Beauchamp M.D., 1 Tab at 12/21/20 0520  •  acetaminophen (Tylenol) tablet 650 mg, 650 mg, Oral, Q4HRS PRN, Clementina Beauchamp M.D., 650 mg at 12/20/20 1007  •  calcium carbonate (TUMS) chewable tab 1,000 mg, 1,000 mg, Oral, TID PRN, Clementina Beauchamp M.D., 1,000 mg at 12/20/20 0812  •  ticagrelor (BRILINTA) tablet 90 mg, 90 mg, Oral, BID, Brittany Lee M.D., 90 mg at 12/21/20 0521  •  dabigatran (PRADAXA) capsule 150 mg, 150 mg, Oral, BID, Brittany Lee M.D., 150 mg at 12/21/20 0521  •  [Held by provider] lisinopril (PRINIVIL) tablet 20 mg, 20 mg, Oral, Q DAY, Clementina Beauchamp M.D., 20 mg at 12/20/20 0647  •  [Held by provider] amLODIPine (NORVASC) tablet 10 mg, 10 mg, Oral, Q DAY, Clementina Beauchamp M.D., 10 mg at 12/20/20 0647  •  melatonin tablet 5 mg, 5 mg, Oral, HS PRN, Clementina Beauchamp M.D., 5 mg at 12/17/20 2058  •  labetalol (NORMODYNE/TRANDATE) injection 10 mg, 10 mg, Intravenous, Q4HRS PRN, Celmentina Beauchamp M.D., 10 mg at 12/15/20 2004  •  atorvastatin (LIPITOR) tablet 80 mg, 80 mg, Oral, Q EVENING, Roe Delaney M.D., 80 mg at 12/20/20 1703  •  tizanidine (ZANAFLEX) tablet 4 mg, 4 mg, Oral, Q6HRS PRN, Triston Galindo M.D., 4 mg at 12/20/20 1703  *ECG: personally reviewed QTc  564  *Cranial Imaging: personally reviewed CT Chronic lacunar infarcts are seen in the right frontoparietal white matter. There is an area of encephalomalacia in the right basal ganglia   old   lacunar infarct in the right baldo. MRI: also reviewed: R MCA occlusion with acute and subacute infarcts in the R cerebral hemisphere        *Labs:  Recent Labs     12/19/20  0549 12/20/20  0045 12/21/20  0832   WBC 5.2 4.1* 6.2   RBC 4.73 4.50* 4.37*   HEMOGLOBIN 14.4 13.8* 14.0   HEMATOCRIT 45.0 42.2 40.4*   MCV 95.1 93.8 92.4   MCH 30.4 30.7 32.0   RDW 45.7 44.7 44.7   PLATELETCT 168 149* 182   MPV 9.9 9.9 9.9   NEUTSPOLYS 67.30 67.60 79.60*    LYMPHOCYTES 20.40* 19.90* 11.70*   MONOCYTES 11.50 11.80 8.20   EOSINOPHILS 0.20 0.00 0.00   BASOPHILS 0.20 0.50 0.20     Lab Results   Component Value Date/Time    SODIUM 131 (L) 12/21/2020 08:32 AM    POTASSIUM 4.6 12/21/2020 08:32 AM    CHLORIDE 103 12/21/2020 08:32 AM    CO2 13 (L) 12/21/2020 08:32 AM    GLUCOSE 145 (H) 12/21/2020 08:32 AM    BUN 48 (H) 12/21/2020 08:32 AM    CREATININE 1.60 (H) 12/21/2020 08:32 AM          *ASSESSMENT/RECOMENDATIONS:    1. Adjustment disorder with depressed mood  - R/O major depression  -starting lexapro 20 mg  -remeron 7.5 mg for sleep    2. R/O neurocognitive disorder  -consider speech cog eval        3.Generalized anxiety disorder    4. 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 L sided weakness  -DM  -HTN    Legal hold: extended  Observation status: 1:1 sitter  Privileges (while on legal hold): per nursing discretion     Consider OT/PT consult to see what pt can do on his own and if he can return home.    *Will Follow  *Thank you for the consult

## 2020-12-22 NOTE — PROGRESS NOTES
ID called for remdesivir approval.  Chart reviewed.  Patient with COVID-19, worsening overnight now requiring 15 L oxygen mask, new fever up to 104.  Patient was started on Zyvox, Zosyn, Flagyl, blood cultures obtained.  Suspicions include worsening COVID-19, aspiration pneumonitis.  Procalcitonin negative    -Remdesivir approved.  Will initiate a 5-day course.  Monitor daily LFTs.  Discontinue if ALT >500 or if creatinine clearance <30  -Agree with dexamethasone  -Consider CT PE  -Stop Flagyl since Zosyn has adequate anaerobic coverage  -Recommend MRSA nares swab to decide on discontinuation of Zyvox  -Follow pending blood cultures    Continue management per hospitalist team.  Please call us back if formal consult required

## 2020-12-22 NOTE — PROGRESS NOTES
Bedside report received from day RN. PT is A&Ox3; disoriented to situation. PT is on room air and a Medical PT. No S/S of pain or discomfort at this time. PT is on a legal hold and sitter is at bedside. PT is educated to use call light. Bed is in lowest and locked position. Isolation precautions in place. Will continue plan of care.

## 2020-12-23 PROBLEM — J12.82 PNEUMONIA DUE TO COVID-19 VIRUS: Status: ACTIVE | Noted: 2020-01-01

## 2020-12-23 PROBLEM — K92.1 MELENA: Status: ACTIVE | Noted: 2020-01-01

## 2020-12-23 NOTE — DISCHARGE PLANNING
Anticipated Discharge Disposition: TBD    Action: Discussed in IDT rounds. Pt is on a legal hold (pending certification) but not medically cleared for transfer to inpatient psych facility.    Barriers to Discharge:   Medical clearance pending-high oxygen needs  Legal hold certification pending    Plan: Care coordination will follow and assist with discharge plan.

## 2020-12-23 NOTE — DISCHARGE PLANNING
SW had [facility] upload AD to chart:     DPOA: Deandre Stevens (friend)  1st alternative: Stanton Whittington (friend)

## 2020-12-23 NOTE — PROGRESS NOTES
Hospital Medicine Daily Progress Note    Date of Service  12/22/2020    Chief Complaint  65 y.o. male admitted 12/14/2020 with left sided weakness    Hospital Course  65 years old man with cardiac history, risk factor presented with left-sided weakness secondary to acute CVA.  CT angio of the head and neck show occluded right MCA with 50% stenosis of the basilar artery.   MRI consistent with stroke, embolic likely.  Underlying etiology due to non-compliance with antiplatelet and anticoagulation therapy P[facility]. [facility] and was transferred to [facility] for bed availability.    On hospital day #3 patient developed fever and had CXR consistent with PNA. He was started on Augmentin for PNA.    On hospital day #7 patient expressed suicidal ideation. Psych consulted and was placed on legal hold. Patient also complained of diffuse abdominal pain. CT A/P negative for etiology of pain and Cdiff was ordered.    Early morning on 12/22 patient was noted to have sepsis parameters and increased oxygen needs from RA to 15L facemask. Differential diagnosis included aspiration pneumonitis and worsening COVID19 PNA. Patient had negative d-dimer, which makes PE unlikely. Procalcitonin negative, but some concern over aspiration. Patient was started on broad spectrum Abx which was deescalated to Zosyn. Discussed with ID and was started on Decadron 6mg daily for 10 day course and Remdesivir for 5 day course.    Patient evaluated by SLP who recommended a soft and bite sized diet with thin liquids.    Interval Problem Update    Patient became septic last night with fevers and tachycardia. Workup showed worsening right sided infiltrates. Differential diagnosis of aspiration pneumonitis vs worsening COVID19 PNA. Started on Abx, decadron, fluids, and remdesivir.    Consultants/Specialty  Neurology  Infectious Disease    Code Status  Full Code    Disposition  TBD, Likely SNF    Review of  Systems  Review of Systems   Constitutional: Positive for chills, fever and malaise/fatigue.   HENT: Negative.  Negative for hearing loss and tinnitus.    Eyes: Negative.  Negative for photophobia and pain.   Respiratory: Positive for cough and shortness of breath.    Cardiovascular: Negative for chest pain, palpitations and leg swelling.   Gastrointestinal: Positive for abdominal pain. Negative for diarrhea, nausea and vomiting.   Genitourinary: Negative.  Negative for dysuria, frequency and urgency.   Musculoskeletal: Positive for back pain, joint pain and myalgias.   Skin: Negative.  Negative for rash.   Neurological: Negative.  Negative for dizziness and headaches.   Psychiatric/Behavioral: Positive for suicidal ideas.        Physical Exam  Temp:  [36.9 °C (98.4 °F)-40.4 °C (104.7 °F)] 36.9 °C (98.4 °F)  Pulse:  [] 92  Resp:  [17-25] 20  BP: (103-117)/(64-80) 106/64  SpO2:  [90 %-96 %] 96 %    Physical Exam  Constitutional:       Appearance: He is ill-appearing.   HENT:      Head: Normocephalic.      Nose: Nose normal.      Mouth/Throat:      Mouth: Mucous membranes are moist.      Pharynx: Oropharynx is clear.   Eyes:      Pupils: Pupils are equal, round, and reactive to light.   Neck:      Musculoskeletal: Normal range of motion. No neck rigidity.   Cardiovascular:      Rate and Rhythm: Regular rhythm. Tachycardia present.   Pulmonary:      Breath sounds: No stridor. Rhonchi and rales present. No wheezing.   Abdominal:      General: Abdomen is flat.      Palpations: Abdomen is soft.      Tenderness: There is abdominal tenderness. There is no guarding or rebound.   Musculoskeletal:      Right lower leg: No edema.      Left lower leg: No edema.   Skin:     General: Skin is warm and dry.      Capillary Refill: Capillary refill takes less than 2 seconds.   Neurological:      General: No focal deficit present.      Mental Status: He is alert.   Psychiatric:      Comments: Suicidal ideation          Fluids    Intake/Output Summary (Last 24 hours) at 12/22/2020 1632  Last data filed at 12/21/2020 1730  Gross per 24 hour   Intake --   Output 450 ml   Net -450 ml       Laboratory  Recent Labs     12/20/20  0045 12/21/20  0832 12/22/20  0434   WBC 4.1* 6.2 6.7   RBC 4.50* 4.37* 4.26*   HEMOGLOBIN 13.8* 14.0 13.3*   HEMATOCRIT 42.2 40.4* 40.1*   MCV 93.8 92.4 94.1   MCH 30.7 32.0 31.2   MCHC 32.7* 34.7 33.2*   RDW 44.7 44.7 45.5   PLATELETCT 149* 182 202   MPV 9.9 9.9 10.1     Recent Labs     12/20/20  0045 12/21/20  0832 12/22/20  0449   SODIUM 131* 131* 135   POTASSIUM 4.5 4.6 4.6   CHLORIDE 101 103 109   CO2 18* 13* 12*   GLUCOSE 97 145* 119*   BUN 34* 48* 42*   CREATININE 1.39 1.60* 1.21   CALCIUM 8.9 8.9 8.8                   Imaging  DX-CHEST-LIMITED (1 VIEW)   Final Result      Mildly increased right upper lobe opacity and new ill-defined hazy bibasilar opacities consistent with multifocal pneumonia.      CT-ABDOMEN-PELVIS WITH   Final Result      1.  No acute inflammatory process in the abdomen or pelvis.   2.  Liquid stool in the colon and rectum, suggestive of diarrhea.   3.  Ill-defined opacities in the periphery of the lower lungs, likely sequela of COVID pneumonia.      DX-CHEST-PORTABLE (1 VIEW)   Final Result      Ill-defined airspace opacities in the right upper lobe worrisome for pneumonia.      Mild cardiomegaly.         CT-HEAD W/O   Final Result      Small acute right frontoparietal infarcts were better assessed on same day MRI.      Old lacunar infarcts in the right frontoparietal white matter, basal ganglia, right insula and right baldo.      Atrophy         MR-BRAIN-W/O   Final Result      1.  Findings of right MCA occlusion with acute/subacute punctate infarcts in the right frontal white matter and small right parietal region cortical infarcts and several chronic right cerebral infarcts as detailed above.   2.  Probable additional punctate recent left basal ganglia lacunar infarct.    3.  Additional chronic lacunar infarcts in the basal ganglia, thalami and right baldo.   4.  Generalized cerebral atrophy and chronic microvascular ischemic type changes.   5.  No acute intracranial hemorrhage.      EC-ECHOCARDIOGRAM COMPLETE W/O CONT   Final Result      CT-CTA HEAD WITH & W/O-POST PROCESS   Final Result         1.  Occlusion at the right M1 segment with no significant reconstitution to the right middle cerebral artery distribution   2.  50% narrowing of the distal basilar artery   3.  Changes of atrophy with nonspecific white matter changes most commonly associated with small vessel ischemia.   4.  Right frontal and parietal encephalomalacia.      These findings were discussed with the patient's clinician, Dr. Galindo, on 12/15/2020 3:14 AM.      CT-CTA NECK WITH & W/O-POST PROCESSING   Final Result         1.  No high-grade stenosis, aneurysm, dissection, or occlusion. See dedicated CT angiogram the head for intracranial findings   2.  Reticular opacities in the right upper lobe, appearance suggests changes of Covid infiltrate.              Assessment/Plan  * Acute respiratory failure with hypoxia (HCC)  Assessment & Plan  Patient with new acute hypoxic respiratory failure  CXR with worsening right sided infiltrates  Differential diagnosis include aspiration pneumonitis, worsening COVID19 PNA, and pulmonary embolism  Pulmonary embolism low on differential due to normal D-Dimer overnight  Started on Broad spectrum Abx, deescalated to Zosyn to cover for possible aspiration  Started on Dexamethasone for 10 day course due to COVID19  Consulted ID and started on Remdesivir for 5 day course  Continue supportive care  Oxygen support, wean as tolerated    Sepsis (HCC)  Assessment & Plan  This is Sepsis Not present on admission  SIRS criteria identified on my evaluation include: Fever, with temperature greater than 101 deg F, Tachycardia, with heart rate greater than 90 BPM and Tachypnea, with  respirations greater than 20 per minute  Source is Pulmonary, unclear if secondary to aspiration PNA or worsening COVID19    Due to COVID19 he was not given guideline directed IVF at 30ml/hr. He was given less rate to prevent fluid overload.  Started on broad spectrum Abx (Zyvox, Zosyn, Flagyl) as was previously on Ceftriaxone and Augmentin. Abx deescalated to Zosyn    While organ dysfunction may be noted elsewhere in this problem list or in the chart, degree of organ dysfunction does not meet CMS criteria for severe sepsis          CVA (cerebrovascular accident) (HCC)- (present on admission)  Assessment & Plan  Likely embolic in etiology.  He was not taking his antiplatelet and anticoagulant PTA.  Neurology saw and signed off.  He is back to his baseline and all appropriate medications were restarted and [facility].  Effient changed to Brilinta.  Pradaxa restarted.  He can not stop taking these two medications unless told otherwise by his doctors.  Cont statin.      Suicidal ideation  Assessment & Plan  Endorsed thoughts to kill himself 12/20 evening.  Sitter.  Psychiatry consult recommends legal hold    Right lower quadrant abdominal pain  Assessment & Plan  C/o abdominal pain new on this admission associated with reported diarrhea.  CT Abdomin and Pelvis on 12/21 without etiology  Due to being on Abx during admission Cdiff sent and pending    Pneumonia due to infectious organism  Assessment & Plan  CXR on admission show RUP infiltrate  Was on ceftriaxone which was changed to Augmentin  However on 12/22 noted to be septic with worsening respiratory failure, see above     Coronary artery disease involving native coronary artery of native heart- (present on admission)  Assessment & Plan  Had stents placed in 10/20.  Now on Brillinta instead of Effient given acute stroke.  On statin.      COVID-19 virus infection- (present on admission)  Assessment & Plan  See above    DIABETES MELLITUS  Assessment  & Plan  Patient will be on insulin sliding scale to keep blood sugars less than 180 while hospitalized    Hyperlipemia- (present on admission)  Assessment & Plan  Continue high intensity statins    Hypertension- (present on admission)  Assessment & Plan  On lisinopril, amlodipine.         VTE prophylaxis: Pradaxa

## 2020-12-23 NOTE — ASSESSMENT & PLAN NOTE
Patient with new acute hypoxic respiratory failure on 12/22  CXR with worsening right sided infiltrates  Due to worsening COVID19 PNA. Differentials include aspiration pneumonia, however, with negative procalcitonin, Zosyn discontinued  Pulmonary embolism low on differential due to normal D-Dimer  Has now completed dexamethasone for 10 day course and Remdesivir for 5 day course    Patient status worsening 12/27 placed on high flow nasal cannula   Ddimer negative, risk of PE low  Patient was continue supportive care with oxygen support    Pt has been on HFNC 60L FiO2 100% for about last 48hrs without improvement. Goals of Care going forward is discussed. He decides to be made DNR/DNI and wishes to be [facility].

## 2020-12-23 NOTE — PROGRESS NOTES
Hospital Medicine Daily Progress Note    Date of Service  12/23/2020    Chief Complaint  65 y.o. male admitted 12/14/2020 with left sided weakness    Hospital Course  65 years old man with cardiac history, risk factor presented with left-sided weakness secondary to acute CVA.  CT angio of the head and neck show occluded right MCA with 50% stenosis of the basilar artery.   MRI consistent with stroke, embolic likely.  Underlying etiology due to non-compliance with antiplatelet and anticoagulation therapy PTA [facility]. [facility] and was [facility] to [facility] for bed availability.    On hospital day #3 patient developed fever and had CXR consistent with PNA. He was started on Augmentin for PNA.    On hospital day #7 patient expressed suicidal ideation. Psych consulted and was placed on legal hold. Patient also complained of diffuse abdominal pain. CT A/P negative for etiology of pain and Cdiff was ordered.    Early morning on 12/22 patient was noted to have sepsis parameters and increased oxygen needs from RA to 15L facemask. Differential diagnosis included aspiration pneumonitis and worsening COVID19 PNA. Patient had negative d-dimer, which makes PE unlikely. Procalcitonin negative, but some concern over aspiration. Patient was started on broad spectrum Abx which was deescalated to Zosyn. Discussed with ID and was started on Decadron 6mg daily for 10 day course and Remdesivir for 5 day course.    Patient evaluated by SLP who recommended a soft and bite sized diet with thin liquids.    Interval Problem Update    Patient improved and feeling better today. He has some confusion but otherwise orientated. Feels his breathing has improved.    FOBT+ and reported melena. On PPI. Will monitor and trend hemoglobin. Consider GI consult if worsening    Consultants/Specialty  Neurology  Infectious Disease    Code Status  Full Code    Disposition  TBD, Likely SNF    Review of Systems  Review  of Systems   Constitutional: Positive for chills, fever and malaise/fatigue.   HENT: Negative.  Negative for hearing loss and tinnitus.    Eyes: Negative.  Negative for photophobia and pain.   Respiratory: Positive for cough and shortness of breath.    Cardiovascular: Negative for chest pain, palpitations and leg swelling.   Gastrointestinal: Positive for abdominal pain. Negative for diarrhea, nausea and vomiting.   Genitourinary: Negative.  Negative for dysuria, frequency and urgency.   Musculoskeletal: Positive for back pain, joint pain and myalgias.   Skin: Negative.  Negative for rash.   Neurological: Negative.  Negative for dizziness and headaches.   Psychiatric/Behavioral: Positive for suicidal ideas.        Physical Exam  Temp:  [36.3 °C (97.3 °F)-37.2 °C (98.9 °F)] 36.3 °C (97.3 °F)  Pulse:  [83-92] 91  Resp:  [16-20] 16  BP: ()/(64-78) 108/74  SpO2:  [92 %-96 %] 96 %    Physical Exam  Constitutional:       Appearance: He is ill-appearing.   HENT:      Head: Normocephalic.      Nose: Nose normal.      Mouth/Throat:      Mouth: Mucous membranes are moist.      Pharynx: Oropharynx is clear.   Eyes:      Pupils: Pupils are equal, round, and reactive to light.   Neck:      Musculoskeletal: Normal range of motion. No neck rigidity.   Cardiovascular:      Rate and Rhythm: Normal rate and regular rhythm.   Pulmonary:      Breath sounds: No stridor. Rhonchi and rales present. No wheezing.   Abdominal:      General: Abdomen is flat.      Palpations: Abdomen is soft.      Tenderness: There is abdominal tenderness. There is no guarding or rebound.   Musculoskeletal:      Right lower leg: No edema.      Left lower leg: No edema.   Skin:     General: Skin is warm and dry.      Capillary Refill: Capillary refill takes less than 2 seconds.   Neurological:      General: No focal deficit present.      Mental Status: He is alert.   Psychiatric:      Comments: Suicidal ideation         Fluids    Intake/Output Summary  (Last 24 hours) at 12/23/2020 1328  Last data filed at 12/23/2020 0500  Gross per 24 hour   Intake 400 ml   Output 1050 ml   Net -650 ml       Laboratory  Recent Labs     12/21/20  0832 12/22/20  0434 12/23/20  0659   WBC 6.2 6.7 5.9   RBC 4.37* 4.26* 3.92*   HEMOGLOBIN 14.0 13.3* 12.3*   HEMATOCRIT 40.4* 40.1* 37.6*   MCV 92.4 94.1 95.9   MCH 32.0 31.2 31.4   MCHC 34.7 33.2* 32.7*   RDW 44.7 45.5 46.1   PLATELETCT 182 202 200   MPV 9.9 10.1 10.1     Recent Labs     12/21/20  0832 12/22/20  0449 12/23/20  0659   SODIUM 131* 135 135   POTASSIUM 4.6 4.6 4.6   CHLORIDE 103 109 112   CO2 13* 12* 13*   GLUCOSE 145* 119* 174*   BUN 48* 42* 39*   CREATININE 1.60* 1.21 1.18   CALCIUM 8.9 8.8 8.7                   Imaging  DX-CHEST-LIMITED (1 VIEW)   Final Result      Mildly increased right upper lobe opacity and new ill-defined hazy bibasilar opacities consistent with multifocal pneumonia.      CT-ABDOMEN-PELVIS WITH   Final Result      1.  No acute inflammatory process in the abdomen or pelvis.   2.  Liquid stool in the colon and rectum, suggestive of diarrhea.   3.  Ill-defined opacities in the periphery of the lower lungs, likely sequela of COVID pneumonia.      DX-CHEST-PORTABLE (1 VIEW)   Final Result      Ill-defined airspace opacities in the right upper lobe worrisome for pneumonia.      Mild cardiomegaly.         CT-HEAD W/O   Final Result      Small acute right frontoparietal infarcts were better assessed on same day MRI.      Old lacunar infarcts in the right frontoparietal white matter, basal ganglia, right insula and right baldo.      Atrophy         MR-BRAIN-W/O   Final Result      1.  Findings of right MCA occlusion with acute/subacute punctate infarcts in the right frontal white matter and small right parietal region cortical infarcts and several chronic right cerebral infarcts as detailed above.   2.  Probable additional punctate recent left basal ganglia lacunar infarct.   3.  Additional chronic lacunar  infarcts in the basal ganglia, thalami and right baldo.   4.  Generalized cerebral atrophy and chronic microvascular ischemic type changes.   5.  No acute intracranial hemorrhage.      EC-ECHOCARDIOGRAM COMPLETE W/O CONT   Final Result      CT-CTA HEAD WITH & W/O-POST PROCESS   Final Result         1.  Occlusion at the right M1 segment with no significant reconstitution to the right middle cerebral artery distribution   2.  50% narrowing of the distal basilar artery   3.  Changes of atrophy with nonspecific white matter changes most commonly associated with small vessel ischemia.   4.  Right frontal and parietal encephalomalacia.      These findings were discussed with the patient's clinician, Dr. Galindo, on 12/15/2020 3:14 AM.      CT-CTA NECK WITH & W/O-POST PROCESSING   Final Result         1.  No high-grade stenosis, aneurysm, dissection, or occlusion. See dedicated CT angiogram the head for intracranial findings   2.  Reticular opacities in the right upper lobe, appearance suggests changes of Covid infiltrate.              Assessment/Plan  * Acute respiratory failure with hypoxia (HCC)  Assessment & Plan  Patient with new acute hypoxic respiratory failure on 12/22  CXR with worsening right sided infiltrates  Differential diagnosis include aspiration pneumonitis, worsening COVID19 PNA, and pulmonary embolism  Pulmonary embolism low on differential due to normal D-Dimer  Started on Broad spectrum Abx, deescalated to Zosyn to cover for possible aspiration. Will trend procalcitonin   Started on Dexamethasone for 10 day course due to COVID19  Consulted ID and started on Remdesivir for 5 day course  Continue supportive care  Oxygen support, wean as tolerated    Sepsis (HCC)  Assessment & Plan  This is Sepsis Not present on admission  SIRS criteria identified on my evaluation include: Fever, with temperature greater than 101 deg F, Tachycardia, with heart rate greater than 90 BPM and Tachypnea, with respirations  greater than 20 per minute  Source is Pulmonary, unclear if secondary to aspiration PNA or worsening COVID19    Due to COVID19 he was not given guideline directed IVF at 30ml/hr. He was given less rate to prevent fluid overload.  Started on broad spectrum Abx (Zyvox, Zosyn, Flagyl) as was previously on Ceftriaxone and Augmentin. Abx deescalated to Zosyn    While organ dysfunction may be noted elsewhere in this problem list or in the chart, degree of organ dysfunction does not meet CMS criteria for severe sepsis          CVA (cerebrovascular accident) (HCC)- (present on admission)  Assessment & Plan  Likely embolic in etiology.  He was not taking his antiplatelet and anticoagulant PTA.  Neurology saw and signed off.  He is back to his baseline and all appropriate medications were restarted and [facility].  Effient changed to Brilinta.  Pradaxa restarted.  He can not stop taking these two medications unless told otherwise by his doctors.  Cont statin.      Suicidal ideation  Assessment & Plan  Endorsed thoughts to kill himself 12/20 evening.  Sitter.  Psychiatry consult recommends legal hold    Right lower quadrant abdominal pain  Assessment & Plan  C/o abdominal pain new on this admission associated with reported diarrhea.  CT Abdomin and Pelvis on 12/21 without etiology  Cdiff negative  Pain improved    Melena  Assessment & Plan  Positive fecal occult blood test, unclear etiology but is on blood thinner  Start Omeprazole BID  Trend H/H  Consider GI consult if worsens    Pneumonia due to infectious organism  Assessment & Plan  CXR on admission show RUP infiltrate  Was on ceftriaxone which was changed to Augmentin  However on 12/22 noted to be septic with worsening respiratory failure, differentials include aspiration and COVID19 pneumonia    Coronary artery disease involving native coronary artery of native heart- (present on admission)  Assessment & Plan  Had stents placed in 10/20.  Now on  Brillinta instead of Effient given acute stroke.  On statin.      Pneumonia due to COVID-19 virus- (present on admission)  Assessment & Plan  See above    DIABETES MELLITUS  Assessment & Plan  Patient will be on insulin sliding scale to keep blood sugars less than 180 while hospitalized    Hyperlipemia- (present on admission)  Assessment & Plan  Continue high intensity statins    Hypertension- (present on admission)  Assessment & Plan  On lisinopril, amlodipine.         VTE prophylaxis: Pradaxa

## 2020-12-23 NOTE — DISCHARGE PLANNING
Received verified petition from the court. Sent copy to [facility], scanned copy into media manager.

## 2020-12-23 NOTE — HOSPITAL COURSE
65 years old man with cardiac history, risk factor presented with left-sided weakness secondary to acute CVA.  CT angio of the head and neck show occluded right MCA with 50% stenosis of the basilar artery.   MRI consistent with stroke, embolic likely.  Underlying etiology due to non-compliance with antiplatelet and anticoagulation therapy PTA [facility]. [facility] and was [facility] to [facility] for bed availability.    On hospital day #3 patient developed fever and had CXR consistent with PNA. He was started on Augmentin for PNA.    On hospital day #7 patient expressed suicidal ideation. Psych consulted and was placed on legal hold. Patient also complained of diffuse abdominal pain. CT A/P negative for etiology of pain and Cdiff was negative. He was noted to have melena however H/H stable. He was started on PPI BID.    Early morning on 12/22 patient was noted to have sepsis parameters and increased oxygen needs from RA to 15L facemask. Differential diagnosis included aspiration pneumonitis and worsening COVID19 PNA. Patient had negative d-dimer, which makes PE unlikely. Procalcitonin negative, but some concern over aspiration. Patient was started on broad spectrum Abx which was deescalated to Zosyn. Discussed with ID and was started on Decadron 6mg daily for 10 day course and Remdesivir for 5 day course. Repeat procalcitonins negative, so stopped Zosyn 12/24.    Patient re-evaluated by psych on 12/25 and legal hold removed as he was deemed to no longer have suicidal ideation. Psych diagnosed adjustment disorder with depression and generalized anxiety disorder. He was started on Lexapro, Remeron, and Klonopin 0.5mg BID by psychiatry.    12/27 Rapid response called for worsening hypoxia. Patient required high flow nasal cannula. Given lasix with some diuresis, however does not appear volume overloaded. He completed remdesivir. Continued supportive care with oxygen  support.    Patient evaluated by SLP who recommended a soft and bite sized diet with thin liquids.

## 2020-12-23 NOTE — PROGRESS NOTES
Received Bedside report. Assumed care at 0700. This pt is AOx4, ambulatory with x2 assist. Patient and RN discussed plan of care: questions answered. Labs noted, assessment complete, patient tolerating soft and bite sized diet. Tele box in place. Pt is on 15 L of O2 via Oxymask. Call light in place, fall precautions in place, patient educated on importance of calling for assistance. No additional needs at this time. VSS

## 2020-12-23 NOTE — PROGRESS NOTES
Bedside report received from day RN. PT is A&Ox4 currently. PT is on 15 L of O2 by Oxymask. Tele box in place and verified by monitor room. No S/S of pain or discomfort at this time. PT is educated to use call light. Bed is in lowest and locked position. Isolation Precautions in place. Will continue plan of care.

## 2020-12-23 NOTE — ASSESSMENT & PLAN NOTE
Positive fecal occult blood test, unclear etiology but is on blood thinner  Start Omeprazole BID  Trend H/H  Consider GI consult if worsens

## 2020-12-23 NOTE — ASSESSMENT & PLAN NOTE
This is Sepsis Not present on admission  SIRS criteria identified on admissionn include: Fever, with temperature greater than 101 deg F, Tachycardia, with heart rate greater than 90 BPM and Tachypnea, with respirations greater than 20 per minute  Source is Pulmonary, unclear if secondary to aspiration PNA or worsening COVID19    Diagnosed 12/22, IVF limited in setting of COVID19  Started on broad spectrum Abx (Zyvox, Zosyn, Flagyl) as was previously on Ceftriaxone and Augmentin. Abx deescalated to Zosyn. Negative procalcitonin. Stopped Zosyn 12/24  Sepsis due to acute hypoxic respiratory failure from COVID19 pneumonia    While organ dysfunction may be noted elsewhere in this problem list or in the chart, degree of organ dysfunction does not meet CMS criteria for severe sepsis

## 2020-12-24 PROBLEM — N17.9 ACUTE KIDNEY INJURY (HCC): Status: ACTIVE | Noted: 2020-01-01

## 2020-12-24 NOTE — DISCHARGE PLANNING
Anticipated Discharge Disposition: Home w/roomate with HH    Action: Per chart review, patient now on legal hold. On 10L of O2. Not medically clear. PT/OT notes report HH once okay for d/c.     Barriers to Discharge: Medical clearance, HH set up.     Plan: Patient not ready to d/c. Ensure that HH is set up prior to d/c and that patient is able to get all [facility].

## 2020-12-24 NOTE — PROGRESS NOTES
Assumed care of pt. Bedside report received from day R.N. Pt denies chest pain or SOB. VSS. Pt resting comfortably. C/O generalized pain. All needs met. Fall precautions in place. Bed low and locked. Sitter at bedside for legal hold.

## 2020-12-24 NOTE — PROGRESS NOTES
Hospital Medicine Daily Progress Note    Date of Service  12/24/2020    Chief Complaint  65 y.o. male admitted 12/14/2020 with left sided weakness    Hospital Course  65 years old man with cardiac history, risk factor presented with left-sided weakness secondary to acute CVA.  CT angio of the head and neck show occluded right MCA with 50% stenosis of the basilar artery.   MRI consistent with stroke, embolic likely.  Underlying etiology due to non-compliance with antiplatelet and anticoagulation therapy PTA as per [facility]. [facility] and was transferred to [facility] for bed availability.    On hospital day #3 patient developed fever and had CXR consistent with PNA. He was started on Augmentin for PNA.    On hospital day #7 patient expressed suicidal ideation. Psych consulted and was placed on legal hold. Patient also complained of diffuse abdominal pain. CT A/P negative for etiology of pain and Cdiff was negative. He was noted to have melena however H/H stable. He was started on PPI BID.    Early morning on 12/22 patient was noted to have sepsis parameters and increased oxygen needs from RA to 15L facemask. Differential diagnosis included aspiration pneumonitis and worsening COVID19 PNA. Patient had negative d-dimer, which makes PE unlikely. Procalcitonin negative, but some concern over aspiration. Patient was started on broad spectrum Abx which was deescalated to Zosyn. Discussed with ID and was started on Decadron 6mg daily for 10 day course and Remdesivir for 5 day course. Repeat procalcitonins negative, so stopped Zosyn 12/24.    Patient evaluated by SLP who recommended a soft and bite sized diet with thin liquids.    Interval Problem Update    Patient continues to feel better. He reports breathing improved and diffuse pain and abdominal pain has improved. Hemoglobin stable.    Consultants/Specialty  Neurology  Infectious Disease    Code Status  Full Code    Disposition  TBD, Likely  SNF    Review of Systems  Review of Systems   Constitutional: Positive for chills, fever and malaise/fatigue.   HENT: Negative.  Negative for hearing loss and tinnitus.    Eyes: Negative.  Negative for photophobia and pain.   Respiratory: Positive for cough and shortness of breath.    Cardiovascular: Negative for chest pain, palpitations and leg swelling.   Gastrointestinal: Positive for abdominal pain. Negative for diarrhea, nausea and vomiting.   Genitourinary: Negative.  Negative for dysuria, frequency and urgency.   Musculoskeletal: Positive for back pain, joint pain and myalgias.   Skin: Negative.  Negative for rash.   Neurological: Negative.  Negative for dizziness and headaches.   Psychiatric/Behavioral:        Patient endorsed suicide ideation and was placed on legal hold after evaluation by psychiatry. Currently denies suicidal ideation.        Physical Exam  Temp:  [36.2 °C (97.1 °F)-37.1 °C (98.8 °F)] 37.1 °C (98.8 °F)  Pulse:  [81-86] 81  Resp:  [17-19] 18  BP: (107-164)/(71-94) 126/73  SpO2:  [91 %-99 %] 94 %    Physical Exam  Constitutional:       Appearance: He is ill-appearing.   HENT:      Head: Normocephalic.      Nose: Nose normal.      Mouth/Throat:      Mouth: Mucous membranes are moist.      Pharynx: Oropharynx is clear.   Eyes:      Pupils: Pupils are equal, round, and reactive to light.   Neck:      Musculoskeletal: Normal range of motion. No neck rigidity.   Cardiovascular:      Rate and Rhythm: Normal rate and regular rhythm.   Pulmonary:      Breath sounds: No stridor. Rhonchi and rales present. No wheezing.   Abdominal:      General: Abdomen is flat.      Palpations: Abdomen is soft.      Tenderness: There is abdominal tenderness. There is no guarding or rebound.   Musculoskeletal:      Right lower leg: No edema.      Left lower leg: No edema.   Skin:     General: Skin is warm and dry.      Capillary Refill: Capillary refill takes less than 2 seconds.   Neurological:      General: No  focal deficit present.      Mental Status: He is alert.   Psychiatric:      Comments: Suicidal ideation         Fluids    Intake/Output Summary (Last 24 hours) at 12/24/2020 1307  Last data filed at 12/23/2020 2057  Gross per 24 hour   Intake 120 ml   Output 500 ml   Net -380 ml       Laboratory  Recent Labs     12/22/20  0434 12/23/20  0659 12/24/20  0003   WBC 6.7 5.9 9.6   RBC 4.26* 3.92* 4.00*   HEMOGLOBIN 13.3* 12.3* 12.5*   HEMATOCRIT 40.1* 37.6* 37.7*   MCV 94.1 95.9 94.3   MCH 31.2 31.4 31.3   MCHC 33.2* 32.7* 33.2*   RDW 45.5 46.1 44.6   PLATELETCT 202 200 250   MPV 10.1 10.1 10.2     Recent Labs     12/22/20  0449 12/23/20  0659 12/24/20  0003   SODIUM 135 135 138   POTASSIUM 4.6 4.6 4.2   CHLORIDE 109 112 107   CO2 12* 13* 18*   GLUCOSE 119* 174* 140*   BUN 42* 39* 37*   CREATININE 1.21 1.18 1.42*   CALCIUM 8.8 8.7 9.1                   Imaging  DX-CHEST-LIMITED (1 VIEW)   Final Result      Mildly increased right upper lobe opacity and new ill-defined hazy bibasilar opacities consistent with multifocal pneumonia.      CT-ABDOMEN-PELVIS WITH   Final Result      1.  No acute inflammatory process in the abdomen or pelvis.   2.  Liquid stool in the colon and rectum, suggestive of diarrhea.   3.  Ill-defined opacities in the periphery of the lower lungs, likely sequela of COVID pneumonia.      DX-CHEST-PORTABLE (1 VIEW)   Final Result      Ill-defined airspace opacities in the right upper lobe worrisome for pneumonia.      Mild cardiomegaly.         CT-HEAD W/O   Final Result      Small acute right frontoparietal infarcts were better assessed on same day MRI.      Old lacunar infarcts in the right frontoparietal white matter, basal ganglia, right insula and right baldo.      Atrophy         MR-BRAIN-W/O   Final Result      1.  Findings of right MCA occlusion with acute/subacute punctate infarcts in the right frontal white matter and small right parietal region cortical infarcts and several chronic right  cerebral infarcts as detailed above.   2.  Probable additional punctate recent left basal ganglia lacunar infarct.   3.  Additional chronic lacunar infarcts in the basal ganglia, thalami and right baldo.   4.  Generalized cerebral atrophy and chronic microvascular ischemic type changes.   5.  No acute intracranial hemorrhage.      EC-ECHOCARDIOGRAM COMPLETE W/O CONT   Final Result      CT-CTA HEAD WITH & W/O-POST PROCESS   Final Result         1.  Occlusion at the right M1 segment with no significant reconstitution to the right middle cerebral artery distribution   2.  50% narrowing of the distal basilar artery   3.  Changes of atrophy with nonspecific white matter changes most commonly associated with small vessel ischemia.   4.  Right frontal and parietal encephalomalacia.      These findings were discussed with the patient's clinician, Dr. Galindo, on 12/15/2020 3:14 AM.      CT-CTA NECK WITH & W/O-POST PROCESSING   Final Result         1.  No high-grade stenosis, aneurysm, dissection, or occlusion. See dedicated CT angiogram the head for intracranial findings   2.  Reticular opacities in the right upper lobe, appearance suggests changes of Covid infiltrate.              Assessment/Plan  * Acute respiratory failure with hypoxia (HCC)  Assessment & Plan  Patient with new acute hypoxic respiratory failure on 12/22  CXR with worsening right sided infiltrates  Differential diagnosis include aspiration pneumonitis, worsening COVID19 PNA, and pulmonary embolism  Pulmonary embolism low on differential due to normal D-Dimer  Started on Broad spectrum Abx, deescalated to Zosyn to cover for possible aspiration. As procalcitonin negative will discontinue Zosyn.  Started on Dexamethasone for 10 day course due to COVID19  Consulted ID and started on Remdesivir for 5 day course  Continue supportive care  Oxygen support, wean as tolerated    Sepsis (HCC)  Assessment & Plan  This is Sepsis Not present on admission  SIRS criteria  identified on my evaluation include: Fever, with temperature greater than 101 deg F, Tachycardia, with heart rate greater than 90 BPM and Tachypnea, with respirations greater than 20 per minute  Source is Pulmonary, unclear if secondary to aspiration PNA or worsening COVID19    Due to COVID19 he was not given guideline directed IVF at 30ml/hr. He was given less fluids to prevent fluid overload.  Started on broad spectrum Abx (Zyvox, Zosyn, Flagyl) as was previously on Ceftriaxone and Augmentin. Abx deescalated to Zosyn. Negative procalcitonin. Stopped Zosyn 12/24    While organ dysfunction may be noted elsewhere in this problem list or in the chart, degree of organ dysfunction does not meet CMS criteria for severe sepsis          CVA (cerebrovascular accident) (HCC)- (present on admission)  Assessment & Plan  Likely embolic in etiology.  He was not taking his antiplatelet and anticoagulant PTA.  Neurology saw and signed off.  He is back to his baseline and all appropriate medications were restarted and [facility].  Effient changed to Brilinta.  Pradaxa restarted.  He can not stop taking these two medications unless told otherwise by his doctors.  Cont statin.      Suicidal ideation  Assessment & Plan  Endorsed thoughts to kill himself 12/20 evening.  Sitter.  Psychiatry consulted  Currently on legal hold    Right lower quadrant abdominal pain  Assessment & Plan  C/o abdominal pain new on this admission associated with reported diarrhea.  CT Abdomin and Pelvis on 12/21 without etiology  Cdiff negative  Pain improved    Acute kidney injury (HCC)  Assessment & Plan  Patient developed ROSALIO  Encourage PO intake  Stop lasix  Continue to monitor    Melena  Assessment & Plan  Positive fecal occult blood test, unclear etiology but is on blood thinner  Start Omeprazole BID  Trend H/H  Consider GI consult if worsens    Pneumonia due to infectious organism  Assessment & Plan  CXR on admission show RUP  infiltrate  Was on ceftriaxone which was changed to Augmentin  However on 12/22 noted to be septic with worsening respiratory failure, differentials include aspiration and COVID19 pneumonia    Coronary artery disease involving native coronary artery of native heart- (present on admission)  Assessment & Plan  Had stents placed in 10/20.  Now on Brillinta instead of Effient given acute stroke.  On statin.      Pneumonia due to COVID-19 virus- (present on admission)  Assessment & Plan  See above    DIABETES MELLITUS  Assessment & Plan  Patient will be on insulin sliding scale to keep blood sugars less than 180 while hospitalized    Hyperlipemia- (present on admission)  Assessment & Plan  Continue high intensity statins    Hypertension- (present on admission)  Assessment & Plan  On lisinopril, amlodipine.         VTE prophylaxis: Pradaxa

## 2020-12-24 NOTE — PROGRESS NOTES
Handoff report received from night shift nurse. Pt care assumed. Pt is currently resting in bed. POC discussed with Pt and Pt verbalizes no questions at this time. Pt is AAOx4, on 6L NC, on Tele monitoring, and VSS.  Patient on SI legal hold, sitter at bedside. Call light and belongings within reach, bed in lowest and locked position, and Pt educated on use of call light. Will continue to monitor.

## 2020-12-24 NOTE — CONSULTS
"Brief Behavioral Health Note:    30 minute evaluation and psychotherapy    Chief Complaint: Left sided weakness    HPI: Mr. Christianson is a 65 year old man, easily irritated and frustrated with his current Legal Hold status. Patient was admitted due to left sided weakness secondary to acute CVA. Patient has a cardiac history and is COVID 19 + per medical records. Patient admits to being very upset about his current medical condition stating, \"I am so frustrated with my medical issues I just blurted out \"I don't want to live in this broken body\" but I didn't mean it, I was just upset at the time! \"I just opened my big mouth and I shouldn't have said that\". Patient continued, \"The nurse just took things too far! I am not suicidal and I am not going to hurt myself!\" Patient reports having \"two beautiful daughters that I want to see\".   Patient is future oriented and was able to commit to his own safety.  Patient also appeared to understand the consequences for making such statements even in frustration.    Summary: Patient presents as an easily irritated man who is challenged with his current medical condition and physical limitations. Patient vehemently denies suicidal ideations and is able to express future thinking, has bond with family and friends with whom he wants to see.    Recommendations  1. Discharge Legal Hold  2. Discharge one to one sitter  3. Follow up with  for home health care and home health aid follow up based on patient's home needs and possible deconditioning.    Consultation completed    Thank you   Coral Austin, Ph.D., [facility]  "

## 2020-12-25 NOTE — PROGRESS NOTES
MS:    SR -ST (75 - 107) with Rare PVCs, Couplets, occassional PACs, 3 beats accelerated idioventricular  0.16 / 0.08 / 0.44

## 2020-12-25 NOTE — PROGRESS NOTES
Received bedside report from CLEM Alvarez, pt care assumed, VSS, pt assessment complete. Pt AAOx4 and is emotionally distressed and anxious at baseline despite interventions, c/o 3/10 generalized pain at this time but refused any intervention. No signs of acute distress noted at this time. POC discussed with pt and verbalizes no questions. Pt denies any additional needs at this time. Bed in lowest position, bed alarm on, pt educated on fall risk and verbalized understanding, call light within reach, hourly rounding initiated.

## 2020-12-25 NOTE — PROGRESS NOTES
Hospital Medicine Daily Progress Note    Date of Service  12/25/2020    Chief Complaint  65 y.o. male admitted 12/14/2020 with left sided weakness    Hospital Course  65 years old man with cardiac history, risk factor presented with left-sided weakness secondary to acute CVA.  CT angio of the head and neck show occluded right MCA with 50% stenosis of the basilar artery.   MRI consistent with stroke, embolic likely.  Underlying etiology due to non-compliance with antiplatelet and anticoagulation therapy PTA [facility]. [facility] and was [facility] to [facility] for bed availability.    On hospital day #3 patient developed fever and had CXR consistent with PNA. He was started on Augmentin for PNA.    On hospital day #7 patient expressed suicidal ideation. Psych consulted and was placed on legal hold. Patient also complained of diffuse abdominal pain. CT A/P negative for etiology of pain and Cdiff was negative. He was noted to have melena however H/H stable. He was started on PPI BID.    Early morning on 12/22 patient was noted to have sepsis parameters and increased oxygen needs from RA to 15L facemask. Differential diagnosis included aspiration pneumonitis and worsening COVID19 PNA. Patient had negative d-dimer, which makes PE unlikely. Procalcitonin negative, but some concern over aspiration. Patient was started on broad spectrum Abx which was deescalated to Zosyn. Discussed with ID and was started on Decadron 6mg daily for 10 day course and Remdesivir for 5 day course. Repeat procalcitonins negative, so stopped Zosyn 12/24.    Patient evaluated by SLP who recommended a soft and bite sized diet with thin liquids.    Interval Problem Update    Patient reports that he feels like he is improving. Continues to have some SOB, improved with oxygen. Myalgias have improved as well. Patient was able to walk with walker to restroom with minimal nursing  assistance.    Consultants/Specialty  Neurology  Infectious Disease    Code Status  Full Code    Disposition  TBD, Likely SNF    Review of Systems  Review of Systems   Constitutional: Positive for chills, fever and malaise/fatigue.   HENT: Negative.  Negative for hearing loss and tinnitus.    Eyes: Negative.  Negative for photophobia and pain.   Respiratory: Positive for cough and shortness of breath.    Cardiovascular: Negative for chest pain, palpitations and leg swelling.   Gastrointestinal: Positive for abdominal pain. Negative for diarrhea, nausea and vomiting.   Genitourinary: Negative.  Negative for dysuria, frequency and urgency.   Musculoskeletal: Positive for back pain, joint pain and myalgias.   Skin: Negative.  Negative for rash.   Neurological: Negative.  Negative for dizziness and headaches.   Psychiatric/Behavioral:        Patient endorsed suicide ideation and was placed on legal hold after evaluation by psychiatry. Currently denies suicidal ideation.        Physical Exam  Temp:  [36.2 °C (97.1 °F)-37.2 °C (98.9 °F)] 37.2 °C (98.9 °F)  Pulse:  [79-93] 80  Resp:  [16-18] 16  BP: (102-136)/(66-85) 118/82  SpO2:  [90 %-95 %] 94 %    Physical Exam  Constitutional:       Appearance: He is ill-appearing.   HENT:      Head: Normocephalic.      Nose: Nose normal.      Mouth/Throat:      Mouth: Mucous membranes are moist.      Pharynx: Oropharynx is clear.   Eyes:      Pupils: Pupils are equal, round, and reactive to light.   Neck:      Musculoskeletal: Normal range of motion. No neck rigidity.   Cardiovascular:      Rate and Rhythm: Normal rate and regular rhythm.   Pulmonary:      Breath sounds: No stridor. Rhonchi and rales present. No wheezing.   Abdominal:      General: Abdomen is flat.      Palpations: Abdomen is soft.      Tenderness: There is abdominal tenderness. There is no guarding or rebound.   Musculoskeletal:      Right lower leg: No edema.      Left lower leg: No edema.   Skin:     General: Skin  is warm and dry.      Capillary Refill: Capillary refill takes less than 2 seconds.   Neurological:      General: No focal deficit present.      Mental Status: He is alert.   Psychiatric:      Comments: Suicidal ideation         Fluids    Intake/Output Summary (Last 24 hours) at 12/25/2020 1503  Last data filed at 12/25/2020 1320  Gross per 24 hour   Intake 720 ml   Output 725 ml   Net -5 ml       Laboratory  Recent Labs     12/23/20  0659 12/24/20  0003 12/25/20  0723   WBC 5.9 9.6 8.5   RBC 3.92* 4.00* 4.03*   HEMOGLOBIN 12.3* 12.5* 12.5*   HEMATOCRIT 37.6* 37.7* 37.4*   MCV 95.9 94.3 92.8   MCH 31.4 31.3 31.0   MCHC 32.7* 33.2* 33.4*   RDW 46.1 44.6 43.6   PLATELETCT 200 250 247   MPV 10.1 10.2 10.3     Recent Labs     12/23/20  0659 12/24/20  0003 12/25/20  0723   SODIUM 135 138 133*   POTASSIUM 4.6 4.2 4.2   CHLORIDE 112 107 108   CO2 13* 18* 17*   GLUCOSE 174* 140* 106*   BUN 39* 37* 32*   CREATININE 1.18 1.42* 0.83   CALCIUM 8.7 9.1 8.9                   Imaging  DX-CHEST-LIMITED (1 VIEW)   Final Result      Mildly increased right upper lobe opacity and new ill-defined hazy bibasilar opacities consistent with multifocal pneumonia.      CT-ABDOMEN-PELVIS WITH   Final Result      1.  No acute inflammatory process in the abdomen or pelvis.   2.  Liquid stool in the colon and rectum, suggestive of diarrhea.   3.  Ill-defined opacities in the periphery of the lower lungs, likely sequela of COVID pneumonia.      DX-CHEST-PORTABLE (1 VIEW)   Final Result      Ill-defined airspace opacities in the right upper lobe worrisome for pneumonia.      Mild cardiomegaly.         CT-HEAD W/O   Final Result      Small acute right frontoparietal infarcts were better assessed on same day MRI.      Old lacunar infarcts in the right frontoparietal white matter, basal ganglia, right insula and right baldo.      Atrophy         MR-BRAIN-W/O   Final Result      1.  Findings of right MCA occlusion with acute/subacute punctate infarcts  in the right frontal white matter and small right parietal region cortical infarcts and several chronic right cerebral infarcts as detailed above.   2.  Probable additional punctate recent left basal ganglia lacunar infarct.   3.  Additional chronic lacunar infarcts in the basal ganglia, thalami and right baldo.   4.  Generalized cerebral atrophy and chronic microvascular ischemic type changes.   5.  No acute intracranial hemorrhage.      EC-ECHOCARDIOGRAM COMPLETE W/O CONT   Final Result      CT-CTA HEAD WITH & W/O-POST PROCESS   Final Result         1.  Occlusion at the right M1 segment with no significant reconstitution to the right middle cerebral artery distribution   2.  50% narrowing of the distal basilar artery   3.  Changes of atrophy with nonspecific white matter changes most commonly associated with small vessel ischemia.   4.  Right frontal and parietal encephalomalacia.      These findings were discussed with the patient's clinician, Dr. Galindo, on 12/15/2020 3:14 AM.      CT-CTA NECK WITH & W/O-POST PROCESSING   Final Result         1.  No high-grade stenosis, aneurysm, dissection, or occlusion. See dedicated CT angiogram the head for intracranial findings   2.  Reticular opacities in the right upper lobe, appearance suggests changes of Covid infiltrate.              Assessment/Plan  * Acute respiratory failure with hypoxia (HCC)  Assessment & Plan  Patient with new acute hypoxic respiratory failure on 12/22  CXR with worsening right sided infiltrates  Differential diagnosis include aspiration pneumonitis, worsening COVID19 PNA, and pulmonary embolism  Pulmonary embolism low on differential due to normal D-Dimer  Started on Broad spectrum Abx, deescalated to Zosyn to cover for possible aspiration. As procalcitonin negative will discontinue Zosyn.  Started on Dexamethasone for 10 day course due to COVID19  Consulted ID and started on Remdesivir for 5 day course  Continue supportive care  Oxygen support,  wean as tolerated    Sepsis (HCC)  Assessment & Plan  This is Sepsis Not present on admission  SIRS criteria identified on my evaluation include: Fever, with temperature greater than 101 deg F, Tachycardia, with heart rate greater than 90 BPM and Tachypnea, with respirations greater than 20 per minute  Source is Pulmonary, unclear if secondary to aspiration PNA or worsening COVID19    Due to COVID19 he was not given guideline directed IVF at 30ml/hr. He was given less fluids to prevent fluid overload.  Started on broad spectrum Abx (Zyvox, Zosyn, Flagyl) as was previously on Ceftriaxone and Augmentin. Abx deescalated to Zosyn. Negative procalcitonin. Stopped Zosyn 12/24    While organ dysfunction may be noted elsewhere in this problem list or in the chart, degree of organ dysfunction does not meet CMS criteria for severe sepsis          CVA (cerebrovascular accident) (HCC)- (present on admission)  Assessment & Plan  Likely embolic in etiology.  He was not taking his antiplatelet and anticoagulant PTA.  Neurology saw and signed off.  He is back to his baseline and all appropriate medications were restarted and [facility].  Effient changed to Brilinta.  Pradaxa restarted.  He can not stop taking these two medications unless told otherwise by his doctors.  Cont statin.      Suicidal ideation  Assessment & Plan  Endorsed thoughts to kill himself 12/20 evening.  Sitter.  Psychiatry consulted  Currently on legal hold    Right lower quadrant abdominal pain  Assessment & Plan  C/o abdominal pain new on this admission associated with reported diarrhea.  CT Abdomin and Pelvis on 12/21 without etiology  Cdiff negative  Pain improved    Acute kidney injury (HCC)  Assessment & Plan  Patient developed ROSALIO  Encourage PO intake  Stop lasix  Continue to monitor    Melena  Assessment & Plan  Positive fecal occult blood test, unclear etiology but is on blood thinner  Start Omeprazole BID  Trend H/H  Consider GI  consult if worsens    Pneumonia due to infectious organism  Assessment & Plan  CXR on admission show RUP infiltrate  Was on ceftriaxone which was changed to Augmentin  However on 12/22 noted to be septic with worsening respiratory failure, differentials include aspiration and COVID19 pneumonia    Coronary artery disease involving native coronary artery of native heart- (present on admission)  Assessment & Plan  Had stents placed in 10/20.  Now on Brillinta instead of Effient given acute stroke.  On statin.      Pneumonia due to COVID-19 virus- (present on admission)  Assessment & Plan  See above    DIABETES MELLITUS  Assessment & Plan  Patient will be on insulin sliding scale to keep blood sugars less than 180 while hospitalized    Hyperlipemia- (present on admission)  Assessment & Plan  Continue high intensity statins    Hypertension- (present on admission)  Assessment & Plan  On lisinopril, amlodipine.         VTE prophylaxis: Pradaxa

## 2020-12-25 NOTE — PROGRESS NOTES
Patient repeatedly removing nasal cannula mask. Educated patient on the need for supplemental oxygen, but client continues to remove oxygen. Providing frequent rounding.

## 2020-12-26 PROBLEM — F43.21 ADJUSTMENT DISORDER WITH DEPRESSED MOOD: Status: ACTIVE | Noted: 2020-01-01

## 2020-12-26 PROBLEM — J18.9 PNEUMONIA DUE TO INFECTIOUS ORGANISM: Status: RESOLVED | Noted: 2020-01-01 | Resolved: 2020-01-01

## 2020-12-26 PROBLEM — F41.1 GENERALIZED ANXIETY DISORDER: Status: ACTIVE | Noted: 2020-01-01

## 2020-12-26 NOTE — PROGRESS NOTES
Received bedside report from day shift RN, pt care assumed, VSS, pt assessment complete. Pt AAOx4, c/o 5/10 chronic back pain at this time. No signs of acute distress noted at this time. POC discussed with pt and verbalizes no questions. Pt denies any additional needs at this time. Bed in lowest position, bed alarm on, pt educated on fall risk and verbalized understanding, call light within reach, hourly rounding initiated.

## 2020-12-26 NOTE — ASSESSMENT & PLAN NOTE
Appreciate psychiatry involvement  Lexapro 20mg, Remeron 7.5mg for sleep started but now with Comfort Care status started, will stop these meds

## 2020-12-26 NOTE — PROGRESS NOTES
Hospital Medicine Daily Progress Note    Date of Service  12/26/2020    Chief Complaint  65 y.o. male admitted 12/14/2020 with left sided weakness    Hospital Course  65 years old man with cardiac history, risk factor presented with left-sided weakness secondary to acute CVA.  CT angio of the head and neck show occluded right MCA with 50% stenosis of the basilar artery.   MRI consistent with stroke, embolic likely.  Underlying etiology due to non-compliance with antiplatelet and anticoagulation therapy PTA [facility]. [facility] and was [facility] to [facility] for bed availability.    On hospital day #3 patient developed fever and had CXR consistent with PNA. He was started on Augmentin for PNA.    On hospital day #7 patient expressed suicidal ideation. Psych consulted and was placed on legal hold. Patient also complained of diffuse abdominal pain. CT A/P negative for etiology of pain and Cdiff was negative. He was noted to have melena however H/H stable. He was started on PPI BID.    Early morning on 12/22 patient was noted to have sepsis parameters and increased oxygen needs from RA to 15L facemask. Differential diagnosis included aspiration pneumonitis and worsening COVID19 PNA. Patient had negative d-dimer, which makes PE unlikely. Procalcitonin negative, but some concern over aspiration. Patient was started on broad spectrum Abx which was deescalated to Zosyn. Discussed with ID and was started on Decadron 6mg daily for 10 day course and Remdesivir for 5 day course. Repeat procalcitonins negative, so stopped Zosyn 12/24.    Patient re-evaluated by psych on 12/25 and legal hold removed as he was deemed to no longer have suicidal ideation. Psych diagnosed adjustment disorder with depression and generalized anxiety disorder. He was started on Lexapro, Remeron, and Klonopin 0.5mg BID by psychiatry.    Patient evaluated by SLP who recommended a soft and bite sized diet with thin  liquids.    Interval Problem Update    Patient reports feeling worse today. Reports whole body hurts and stomach upset. Reports depression and anxiety. Fatigue due to not sleeping.    Consultants/Specialty  Neurology  Infectious Disease    Code Status  Full Code    Disposition  TBD, Likely SNF    Review of Systems  Review of Systems   Constitutional: Positive for chills, fever and malaise/fatigue.   HENT: Negative.  Negative for hearing loss and tinnitus.    Eyes: Negative.  Negative for photophobia and pain.   Respiratory: Positive for cough and shortness of breath.    Cardiovascular: Negative for chest pain, palpitations and leg swelling.   Gastrointestinal: Positive for abdominal pain. Negative for diarrhea, nausea and vomiting.   Genitourinary: Negative.  Negative for dysuria, frequency and urgency.   Musculoskeletal: Positive for back pain, joint pain and myalgias.   Skin: Negative.  Negative for rash.   Neurological: Negative.  Negative for dizziness and headaches.   Psychiatric/Behavioral: Positive for depression. Negative for suicidal ideas. The patient is nervous/anxious.         Physical Exam  Temp:  [36.2 °C (97.1 °F)-36.8 °C (98.3 °F)] 36.8 °C (98.3 °F)  Pulse:  [] 96  Resp:  [16-20] 19  BP: (122-149)/(73-91) 149/85  SpO2:  [90 %-94 %] 91 %    Physical Exam  Constitutional:       Appearance: He is ill-appearing.   HENT:      Head: Normocephalic.      Nose: Nose normal.      Mouth/Throat:      Mouth: Mucous membranes are moist.      Pharynx: Oropharynx is clear.   Eyes:      Pupils: Pupils are equal, round, and reactive to light.   Neck:      Musculoskeletal: Normal range of motion. No neck rigidity.   Cardiovascular:      Rate and Rhythm: Normal rate and regular rhythm.   Pulmonary:      Breath sounds: No stridor. Rhonchi and rales present. No wheezing.   Abdominal:      General: Abdomen is flat.      Palpations: Abdomen is soft.      Tenderness: There is abdominal tenderness. There is no guarding  or rebound.   Musculoskeletal:      Right lower leg: No edema.      Left lower leg: No edema.   Skin:     General: Skin is warm and dry.      Capillary Refill: Capillary refill takes less than 2 seconds.   Neurological:      General: No focal deficit present.      Mental Status: He is alert.   Psychiatric:      Comments: Suicidal ideation         Fluids    Intake/Output Summary (Last 24 hours) at 12/26/2020 1530  Last data filed at 12/26/2020 0500  Gross per 24 hour   Intake 250 ml   Output 550 ml   Net -300 ml       Laboratory  Recent Labs     12/24/20  0003 12/25/20  0723   WBC 9.6 8.5   RBC 4.00* 4.03*   HEMOGLOBIN 12.5* 12.5*   HEMATOCRIT 37.7* 37.4*   MCV 94.3 92.8   MCH 31.3 31.0   MCHC 33.2* 33.4*   RDW 44.6 43.6   PLATELETCT 250 247   MPV 10.2 10.3     Recent Labs     12/24/20  0003 12/25/20  0723 12/26/20  0652   SODIUM 138 133* 139   POTASSIUM 4.2 4.2 4.4   CHLORIDE 107 108 105   CO2 18* 17* 20   GLUCOSE 140* 106* 108*   BUN 37* 32* 24*   CREATININE 1.42* 0.83 0.90   CALCIUM 9.1 8.9 9.2                   Imaging  DX-CHEST-LIMITED (1 VIEW)   Final Result      Mildly increased right upper lobe opacity and new ill-defined hazy bibasilar opacities consistent with multifocal pneumonia.      CT-ABDOMEN-PELVIS WITH   Final Result      1.  No acute inflammatory process in the abdomen or pelvis.   2.  Liquid stool in the colon and rectum, suggestive of diarrhea.   3.  Ill-defined opacities in the periphery of the lower lungs, likely sequela of COVID pneumonia.      DX-CHEST-PORTABLE (1 VIEW)   Final Result      Ill-defined airspace opacities in the right upper lobe worrisome for pneumonia.      Mild cardiomegaly.         CT-HEAD W/O   Final Result      Small acute right frontoparietal infarcts were better assessed on same day MRI.      Old lacunar infarcts in the right frontoparietal white matter, basal ganglia, right insula and right baldo.      Atrophy         MR-BRAIN-W/O   Final Result      1.  Findings of right  MCA occlusion with acute/subacute punctate infarcts in the right frontal white matter and small right parietal region cortical infarcts and several chronic right cerebral infarcts as detailed above.   2.  Probable additional punctate recent left basal ganglia lacunar infarct.   3.  Additional chronic lacunar infarcts in the basal ganglia, thalami and right baldo.   4.  Generalized cerebral atrophy and chronic microvascular ischemic type changes.   5.  No acute intracranial hemorrhage.      EC-ECHOCARDIOGRAM COMPLETE W/O CONT   Final Result      CT-CTA HEAD WITH & W/O-POST PROCESS   Final Result         1.  Occlusion at the right M1 segment with no significant reconstitution to the right middle cerebral artery distribution   2.  50% narrowing of the distal basilar artery   3.  Changes of atrophy with nonspecific white matter changes most commonly associated with small vessel ischemia.   4.  Right frontal and parietal encephalomalacia.      These findings were discussed with the patient's clinician, Dr. Galindo, on 12/15/2020 3:14 AM.      CT-CTA NECK WITH & W/O-POST PROCESSING   Final Result         1.  No high-grade stenosis, aneurysm, dissection, or occlusion. See dedicated CT angiogram the head for intracranial findings   2.  Reticular opacities in the right upper lobe, appearance suggests changes of Covid infiltrate.              Assessment/Plan  * Acute respiratory failure with hypoxia (HCC)  Assessment & Plan  Patient with new acute hypoxic respiratory failure on 12/22  CXR with worsening right sided infiltrates  Due to worsening COVID19 PNA. Differentials include aspiration pneumonia, however with negative procalcitonin Zosyn discontinued  Pulmonary embolism low on differential due to normal D-Dimer  Started on Broad spectrum Abx, deescalated to Zosyn to cover for possible aspiration. As procalcitonin negative will Zosyn discontinued  Started on Dexamethasone for 10 day course due to COVID19  Consulted ID and  started on Remdesivir for 5 day course  Continue supportive care  Oxygen support, wean as tolerated    Sepsis (HCC)  Assessment & Plan  This is Sepsis Not present on admission  SIRS criteria identified on my evaluation include: Fever, with temperature greater than 101 deg F, Tachycardia, with heart rate greater than 90 BPM and Tachypnea, with respirations greater than 20 per minute  Source is Pulmonary, unclear if secondary to aspiration PNA or worsening COVID19    Due to COVID19 he was not given guideline directed IVF at 30ml/hr. He was given less fluids to prevent fluid overload.  Started on broad spectrum Abx (Zyvox, Zosyn, Flagyl) as was previously on Ceftriaxone and Augmentin. Abx deescalated to Zosyn. Negative procalcitonin. Stopped Zosyn 12/24    While organ dysfunction may be noted elsewhere in this problem list or in the chart, degree of organ dysfunction does not meet CMS criteria for severe sepsis          CVA (cerebrovascular accident) (HCC)- (present on admission)  Assessment & Plan  Likely embolic in etiology.  He was not taking his antiplatelet and anticoagulant PTA.  Neurology saw and signed off.  He is back to his baseline and all appropriate medications were restarted and [facility].  Effient changed to Brilinta.  Pradaxa restarted.  He can not stop taking these two medications unless told otherwise by his doctors.  Cont statin.      Suicidal ideation  Assessment & Plan  Endorsed thoughts to kill himself 12/20 evening. Was placed on legal hold.  Psychiatry consulted and he was taken off legal hold 12/25      Right lower quadrant abdominal pain  Assessment & Plan  C/o abdominal pain new on this admission associated with reported diarrhea.  CT Abdomin and Pelvis on 12/21 without etiology  Cdiff negative  Pain improved    Generalized anxiety disorder  Assessment & Plan  Appreciate Psych involvement  Started on Klonopin 0.5mg BID    Adjustment disorder with depressed mood  Assessment  & Plan  Appreciate psychiatry involvement  Lexapro 20mg, Remeron 7.5mg for sleep      Acute kidney injury (HCC)  Assessment & Plan  Patient developed ROSALIO  Encourage PO intake  Stop lasix  Continue to monitor    Melena  Assessment & Plan  Positive fecal occult blood test, unclear etiology but is on blood thinner  Start Omeprazole BID  Trend H/H  Consider GI consult if worsens    Coronary artery disease involving native coronary artery of native heart- (present on admission)  Assessment & Plan  Had stents placed in 10/20.  Now on Brillinta instead of Effient given acute stroke.  On statin.      Pneumonia due to COVID-19 virus- (present on admission)  Assessment & Plan  See above    DIABETES MELLITUS  Assessment & Plan  Patient will be on insulin sliding scale to keep blood sugars less than 180 while hospitalized    Hyperlipemia- (present on admission)  Assessment & Plan  Continue high intensity statins    Hypertension- (present on admission)  Assessment & Plan  On lisinopril, amlodipine.         VTE prophylaxis: Pradaxa

## 2020-12-26 NOTE — CONSULTS
"PSYCHIATRIC FOLLOW-UP:(established)  *Reason for admission: sudden onset left-sided weakness    *Legal Hold Status: on legal hold                *HPI: very anxious, not sleeping, not SI, no psychosis, still very depressed as well.  Anxiety is \"affective my stomach and chest\"    *Psychiatric Examination:   Vitals:   Vitals:    12/25/20 2355 12/26/20 0345 12/26/20 0730 12/26/20 1141   BP: 122/87 124/82 132/73 149/85   Pulse: 94 92 (!) 106 96   Resp: 18 18 20 19   Temp: 36.3 °C (97.3 °F) 36.2 °C (97.1 °F) 36.4 °C (97.5 °F) 36.8 °C (98.3 °F)   TempSrc: Temporal Temporal Temporal Temporal   SpO2: 90% 90% 91% 91%   Weight:       Height:         General Appearance:  good eye contact  Abnormal Movements: none   Gait and Posture: sitting up in chair, naked, being tended to by RN  Speech: within normal limits  Thought Process: normal rate  Associations:   linear  Abnormal or Psychotic Thoughts: none  Judgement and Insight: fair  Orientation: grossly intact  Recent and Remote Memory: grossly intact  Attention Span and Concentration: intact  Language:fluent  Fund of Knowledge: not tested  Mood and Affect: depressed and anxious  SI/HI:  suicidal - no and homicidal - no  MMSE score and/or clock drawing:not applicable      *ASSESSMENT/RECOMENDATIONS:     1. Adjustment disorder with depressed mood  - R/O major depression  - lexapro 20 mg  - 7.5 mg for sleep     2. R/O neurocognitive disorder  -consider speech cog eval        3.Generalized anxiety disorder  -klonopin 0.5 mg bid     4. 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 L sided weakness  -DM  -HTN     Legal hold: discontinued  Observation status: routine  Privileges (while on legal hold): no restrictions      *Will Follow    "

## 2020-12-27 NOTE — PROGRESS NOTES
Hospital Medicine Daily Progress Note    Date of Service  12/27/2020    Chief Complaint  65 y.o. male admitted 12/14/2020 with left sided weakness    Hospital Course  65 years old man with cardiac history, risk factor presented with left-sided weakness secondary to acute CVA.  CT angio of the head and neck show occluded right MCA with 50% stenosis of the basilar artery.   MRI consistent with stroke, embolic likely.  Underlying etiology due to non-compliance with antiplatelet and anticoagulation therapy PTA [facility]. [facility] and was [facility] to [facility] for bed availability.    On hospital day #3 patient developed fever and had CXR consistent with PNA. He was started on Augmentin for PNA.    On hospital day #7 patient expressed suicidal ideation. Psych consulted and was placed on legal hold. Patient also complained of diffuse abdominal pain. CT A/P negative for etiology of pain and Cdiff was negative. He was noted to have melena however H/H stable. He was started on PPI BID.    Early morning on 12/22 patient was noted to have sepsis parameters and increased oxygen needs from RA to 15L facemask. Differential diagnosis included aspiration pneumonitis and worsening COVID19 PNA. Patient had negative d-dimer, which makes PE unlikely. Procalcitonin negative, but some concern over aspiration. Patient was started on broad spectrum Abx which was deescalated to Zosyn. Discussed with ID and was started on Decadron 6mg daily for 10 day course and Remdesivir for 5 day course. Repeat procalcitonins negative, so stopped Zosyn 12/24.    Patient re-evaluated by psych on 12/25 and legal hold removed as he was deemed to no longer have suicidal ideation. Psych diagnosed adjustment disorder with depression and generalized anxiety disorder. He was started on Lexapro, Remeron, and Klonopin 0.5mg BID by psychiatry.    Patient evaluated by SLP who recommended a soft and bite sized diet with thin  liquids.    Interval Problem Update    Patient was found down by nursing staff. He denies any traumatic injury and reports he slid out of bed when no one would help him go to bathroom. Denies specific complaints, but seems to be in worse mood today.    Discussed with nursing staff and will attempt to put him on tele-sitter to prevent further falls.    Consultants/Specialty  Neurology  Infectious Disease    Code Status  Full Code    Disposition  TBD, Likely SNF    Review of Systems  Review of Systems   Constitutional: Positive for chills, fever and malaise/fatigue.   HENT: Negative.  Negative for hearing loss and tinnitus.    Eyes: Negative.  Negative for photophobia and pain.   Respiratory: Positive for cough and shortness of breath.    Cardiovascular: Negative for chest pain, palpitations and leg swelling.   Gastrointestinal: Positive for abdominal pain. Negative for diarrhea, nausea and vomiting.   Genitourinary: Negative.  Negative for dysuria, frequency and urgency.   Musculoskeletal: Positive for back pain, joint pain and myalgias.   Skin: Negative.  Negative for rash.   Neurological: Negative.  Negative for dizziness and headaches.   Psychiatric/Behavioral: Positive for depression. Negative for suicidal ideas. The patient is nervous/anxious.         Physical Exam  Temp:  [36.4 °C (97.5 °F)-37.2 °C (99 °F)] 37.2 °C (99 °F)  Pulse:  [] 109  Resp:  [17-20] 17  BP: (125-163)/(80-98) 125/80  SpO2:  [84 %-93 %] 86 %    Physical Exam  Constitutional:       Appearance: He is ill-appearing.   HENT:      Head: Normocephalic.      Nose: Nose normal.      Mouth/Throat:      Mouth: Mucous membranes are moist.      Pharynx: Oropharynx is clear.   Eyes:      Pupils: Pupils are equal, round, and reactive to light.   Neck:      Musculoskeletal: Normal range of motion. No neck rigidity.   Cardiovascular:      Rate and Rhythm: Normal rate and regular rhythm.   Pulmonary:      Breath sounds: No stridor. Rhonchi and rales  present. No wheezing.   Abdominal:      General: Abdomen is flat.      Palpations: Abdomen is soft.      Tenderness: There is abdominal tenderness. There is no guarding or rebound.   Musculoskeletal:      Right lower leg: No edema.      Left lower leg: No edema.   Skin:     General: Skin is warm and dry.      Capillary Refill: Capillary refill takes less than 2 seconds.   Neurological:      General: No focal deficit present.      Mental Status: He is alert.   Psychiatric:      Comments: Dysphoric mood         Fluids  No intake or output data in the 24 hours ending 12/27/20 1200    Laboratory  Recent Labs     12/25/20  0723   WBC 8.5   RBC 4.03*   HEMOGLOBIN 12.5*   HEMATOCRIT 37.4*   MCV 92.8   MCH 31.0   MCHC 33.4*   RDW 43.6   PLATELETCT 247   MPV 10.3     Recent Labs     12/25/20  0723 12/26/20  0652 12/27/20  0723   SODIUM 133* 139 138   POTASSIUM 4.2 4.4 3.8   CHLORIDE 108 105 107   CO2 17* 20 17*   GLUCOSE 106* 108* 116*   BUN 32* 24* 21   CREATININE 0.83 0.90 0.74   CALCIUM 8.9 9.2 9.0                   Imaging  DX-CHEST-LIMITED (1 VIEW)   Final Result      Mildly increased right upper lobe opacity and new ill-defined hazy bibasilar opacities consistent with multifocal pneumonia.      CT-ABDOMEN-PELVIS WITH   Final Result      1.  No acute inflammatory process in the abdomen or pelvis.   2.  Liquid stool in the colon and rectum, suggestive of diarrhea.   3.  Ill-defined opacities in the periphery of the lower lungs, likely sequela of COVID pneumonia.      DX-CHEST-PORTABLE (1 VIEW)   Final Result      Ill-defined airspace opacities in the right upper lobe worrisome for pneumonia.      Mild cardiomegaly.         CT-HEAD W/O   Final Result      Small acute right frontoparietal infarcts were better assessed on same day MRI.      Old lacunar infarcts in the right frontoparietal white matter, basal ganglia, right insula and right baldo.      Atrophy         MR-BRAIN-W/O   Final Result      1.  Findings of right MCA  occlusion with acute/subacute punctate infarcts in the right frontal white matter and small right parietal region cortical infarcts and several chronic right cerebral infarcts as detailed above.   2.  Probable additional punctate recent left basal ganglia lacunar infarct.   3.  Additional chronic lacunar infarcts in the basal ganglia, thalami and right baldo.   4.  Generalized cerebral atrophy and chronic microvascular ischemic type changes.   5.  No acute intracranial hemorrhage.      EC-ECHOCARDIOGRAM COMPLETE W/O CONT   Final Result      CT-CTA HEAD WITH & W/O-POST PROCESS   Final Result         1.  Occlusion at the right M1 segment with no significant reconstitution to the right middle cerebral artery distribution   2.  50% narrowing of the distal basilar artery   3.  Changes of atrophy with nonspecific white matter changes most commonly associated with small vessel ischemia.   4.  Right frontal and parietal encephalomalacia.      These findings were discussed with the patient's clinician, Dr. Galindo, on 12/15/2020 3:14 AM.      CT-CTA NECK WITH & W/O-POST PROCESSING   Final Result         1.  No high-grade stenosis, aneurysm, dissection, or occlusion. See dedicated CT angiogram the head for intracranial findings   2.  Reticular opacities in the right upper lobe, appearance suggests changes of Covid infiltrate.              Assessment/Plan  * Acute respiratory failure with hypoxia (HCC)  Assessment & Plan  Patient with new acute hypoxic respiratory failure on 12/22  CXR with worsening right sided infiltrates  Due to worsening COVID19 PNA. Differentials include aspiration pneumonia, however with negative procalcitonin Zosyn discontinued  Pulmonary embolism low on differential due to normal D-Dimer  Started on Broad spectrum Abx, deescalated to Zosyn to cover for possible aspiration. As procalcitonin negative will Zosyn discontinued  Started on Dexamethasone for 10 day course due to COVID19  Consulted ID and  started on Remdesivir for 5 day course  Continue supportive care  Oxygen support, wean as tolerated    Sepsis (HCC)  Assessment & Plan  This is Sepsis Not present on admission  SIRS criteria identified on my evaluation include: Fever, with temperature greater than 101 deg F, Tachycardia, with heart rate greater than 90 BPM and Tachypnea, with respirations greater than 20 per minute  Source is Pulmonary, unclear if secondary to aspiration PNA or worsening COVID19    Due to COVID19 he was not given guideline directed IVF at 30ml/hr. He was given less fluids to prevent fluid overload.  Started on broad spectrum Abx (Zyvox, Zosyn, Flagyl) as was previously on Ceftriaxone and Augmentin. Abx deescalated to Zosyn. Negative procalcitonin. Stopped Zosyn 12/24    While organ dysfunction may be noted elsewhere in this problem list or in the chart, degree of organ dysfunction does not meet CMS criteria for severe sepsis          CVA (cerebrovascular accident) (HCC)- (present on admission)  Assessment & Plan  Likely embolic in etiology.  He was not taking his antiplatelet and anticoagulant PTA.  Neurology saw and signed off.  He is back to his baseline and all appropriate medications were restarted and [facility].  Effient changed to Brilinta.  Pradaxa restarted.  He can not stop taking these two medications unless told otherwise by his doctors.  Cont statin.      Suicidal ideation  Assessment & Plan  Endorsed thoughts to kill himself 12/20 evening. Was placed on legal hold.  Psychiatry consulted and he was taken off legal hold 12/25      Right lower quadrant abdominal pain  Assessment & Plan  C/o abdominal pain new on this admission associated with reported diarrhea.  CT Abdomin and Pelvis on 12/21 without etiology  Cdiff negative  Pain improved    Generalized anxiety disorder  Assessment & Plan  Appreciate Psych involvement  Started on Klonopin 0.5mg BID    Adjustment disorder with depressed mood  Assessment  & Plan  Appreciate psychiatry involvement  Lexapro 20mg, Remeron 7.5mg for sleep      Acute kidney injury (HCC)  Assessment & Plan  Patient developed ROSALIO  Encourage PO intake  Stop lasix  Continue to monitor    Melena  Assessment & Plan  Positive fecal occult blood test, unclear etiology but is on blood thinner  Start Omeprazole BID  Trend H/H  Consider GI consult if worsens    Coronary artery disease involving native coronary artery of native heart- (present on admission)  Assessment & Plan  Had stents placed in 10/20.  Now on Brillinta instead of Effient given acute stroke.  On statin.      Pneumonia due to COVID-19 virus- (present on admission)  Assessment & Plan  See above    DIABETES MELLITUS  Assessment & Plan  Patient will be on insulin sliding scale to keep blood sugars less than 180 while hospitalized    Hyperlipemia- (present on admission)  Assessment & Plan  Continue high intensity statins    Hypertension- (present on admission)  Assessment & Plan  On lisinopril, amlodipine.         VTE prophylaxis: Pradaxa

## 2020-12-27 NOTE — PROGRESS NOTES
Assumed care of PT A&O 4. Pt resting in bed with moderate work of breathing. On 6 liters NC. Pt refusing tele box. Call light within reach, bed in lowest position, upper bed rails up. Pt was updated on plan of care for the day. Will continue to monitor.

## 2020-12-27 NOTE — PROGRESS NOTES
12 hr chart check    Monitor Summary:  SR-ST   Occasional PVCs    Currently refusing his telemetry

## 2020-12-27 NOTE — PROGRESS NOTES
"Pt found kneeling at the side of bed after responding to bed alarm sounding. Pt denies falling but states he \"slid out of bed\" while trying to go to the bathroom. Pt assisted to BR by CNA with FWW with no new complaints of pain. Pt states he got out of bed because he \"had to piss and no one would show up\". Pt non compliant with call light use. Pt reminded he needs to use call light and when he doesn't he could fall like he just did. Pt states \" that wasn't a fall. Here's your fucking fall.\" while extending middle finger to RN.    Post fall huddle and assessment complete per protocol. MD paged and updated. Pharmacy called for med rec. Pt declines family notification at this time.    Pt currently resting in bed. Side rails up and call light in reach. Pt verbalizes and demonstrates understanding of call light use.  "

## 2020-12-28 NOTE — PROGRESS NOTES
Hospital Medicine Daily Progress Note    Date of Service  12/28/2020    Chief Complaint  65 y.o. male admitted 12/14/2020 with left sided weakness    Hospital Course  65 years old man with cardiac history, risk factor presented with left-sided weakness secondary to acute CVA.  CT angio of the head and neck show occluded right MCA with 50% stenosis of the basilar artery.   MRI consistent with stroke, embolic likely.  Underlying etiology due to non-compliance with antiplatelet and anticoagulation therapy PTA [facility]. [facility] and was transferred to [facility] for bed availability.    On hospital day #3 patient developed fever and had CXR consistent with PNA. He was started on Augmentin for PNA.    On hospital day #7 patient expressed suicidal ideation. Psych consulted and was placed on legal hold. Patient also complained of diffuse abdominal pain. CT A/P negative for etiology of pain and Cdiff was negative. He was noted to have melena however H/H stable. He was started on PPI BID.    Early morning on 12/22 patient was noted to have sepsis parameters and increased oxygen needs from RA to 15L facemask. Differential diagnosis included aspiration pneumonitis and worsening COVID19 PNA. Patient had negative d-dimer, which makes PE unlikely. Procalcitonin negative, but some concern over aspiration. Patient was started on broad spectrum Abx which was deescalated to Zosyn. Discussed with ID and was started on Decadron 6mg daily for 10 day course and Remdesivir for 5 day course. Repeat procalcitonins negative, so stopped Zosyn 12/24.    Patient re-evaluated by psych on 12/25 and legal hold removed as he was deemed to no longer have suicidal ideation. Psych diagnosed adjustment disorder with depression and generalized anxiety disorder. He was started on Lexapro, Remeron, and Klonopin 0.5mg BID by psychiatry.    12/27 Rapid response called for worsening hypoxia. Patient required high flow  nasal cannula. Given lasix with some diuresis, however does not appear volume overloaded. He completed remdesivir. Continued supportive care with oxygen support.    Patient evaluated by SLP who recommended a soft and bite sized diet with thin liquids.    Interval Problem Update    Rapid response 12/27 for worsening hypoxia. Workup included d-dimer negative, low risk of PE. Patient placed on high flow nasal cannula last night and non-rebreather added today.    Nurse concerned about his ability to swallow. He was unable to work with SLP today due to respiratory distress. Will make NPO with medications only for now. Hold IVF in setting of COVID19.    Patient at high risk for continued decompensation and ICU level of care.    Consultants/Specialty  Neurology  Infectious Disease    Code Status  Full Code    Disposition  TBD, Likely SNF    Review of Systems  Review of Systems   Constitutional: Positive for chills, fever and malaise/fatigue.   HENT: Negative.  Negative for hearing loss and tinnitus.    Eyes: Negative.  Negative for photophobia and pain.   Respiratory: Positive for cough and shortness of breath.    Cardiovascular: Negative for chest pain, palpitations and leg swelling.   Gastrointestinal: Positive for abdominal pain. Negative for diarrhea, nausea and vomiting.   Genitourinary: Negative.  Negative for dysuria, frequency and urgency.   Musculoskeletal: Positive for back pain, joint pain and myalgias.   Skin: Negative.  Negative for rash.   Neurological: Negative.  Negative for dizziness and headaches.   Psychiatric/Behavioral: Positive for depression. Negative for suicidal ideas. The patient is nervous/anxious.         Physical Exam  Temp:  [36.1 °C (97 °F)-37.2 °C (99 °F)] 37 °C (98.6 °F)  Pulse:  [] 96  Resp:  [17-26] 20  BP: (115-164)/() 148/85  SpO2:  [80 %-97 %] 94 %    Physical Exam  Constitutional:       General: He is in acute distress.      Appearance: He is ill-appearing.   HENT:       Head: Normocephalic.      Nose: Nose normal.      Mouth/Throat:      Mouth: Mucous membranes are moist.      Pharynx: Oropharynx is clear.   Eyes:      Pupils: Pupils are equal, round, and reactive to light.   Neck:      Musculoskeletal: Normal range of motion. No neck rigidity.   Cardiovascular:      Rate and Rhythm: Normal rate and regular rhythm.   Pulmonary:      Breath sounds: No stridor. Rhonchi and rales present. No wheezing.   Abdominal:      General: Abdomen is flat.      Palpations: Abdomen is soft.      Tenderness: There is abdominal tenderness. There is no guarding or rebound.   Musculoskeletal:      Right lower leg: No edema.      Left lower leg: No edema.   Skin:     General: Skin is warm and dry.      Capillary Refill: Capillary refill takes less than 2 seconds.   Neurological:      General: No focal deficit present.      Mental Status: He is alert.   Psychiatric:      Comments: Dysphoric mood         Fluids    Intake/Output Summary (Last 24 hours) at 12/28/2020 1552  Last data filed at 12/27/2020 2233  Gross per 24 hour   Intake --   Output 450 ml   Net -450 ml       Laboratory  Recent Labs     12/28/20  0859   WBC 11.7*   RBC 4.48*   HEMOGLOBIN 13.8*   HEMATOCRIT 41.8*   MCV 93.3   MCH 30.8   MCHC 33.0*   RDW 43.8   PLATELETCT 347   MPV 10.2     Recent Labs     12/26/20  0652 12/27/20  0723 12/28/20  0859   SODIUM 139 138 141   POTASSIUM 4.4 3.8 3.7   CHLORIDE 105 107 108   CO2 20 17* 18*   GLUCOSE 108* 116* 136*   BUN 24* 21 24*   CREATININE 0.90 0.74 0.90   CALCIUM 9.2 9.0 9.4     Recent Labs     12/27/20  2114   APTT 57.2*   INR 1.40*               Imaging  DX-CHEST-PORTABLE (1 VIEW)   Final Result      Mild worsening hypoinflation and patchy bilateral opacities suggesting Covid 19 pneumonia.      DX-CHEST-PORTABLE (1 VIEW)   Final Result      Mildly improved aeration with mildly improved hazy bilateral pulmonary opacities.      DX-CHEST-LIMITED (1 VIEW)   Final Result      Mildly increased right  upper lobe opacity and new ill-defined hazy bibasilar opacities consistent with multifocal pneumonia.      CT-ABDOMEN-PELVIS WITH   Final Result      1.  No acute inflammatory process in the abdomen or pelvis.   2.  Liquid stool in the colon and rectum, suggestive of diarrhea.   3.  Ill-defined opacities in the periphery of the lower lungs, likely sequela of COVID pneumonia.      DX-CHEST-PORTABLE (1 VIEW)   Final Result      Ill-defined airspace opacities in the right upper lobe worrisome for pneumonia.      Mild cardiomegaly.         CT-HEAD W/O   Final Result      Small acute right frontoparietal infarcts were better assessed on same day MRI.      Old lacunar infarcts in the right frontoparietal white matter, basal ganglia, right insula and right baldo.      Atrophy         MR-BRAIN-W/O   Final Result      1.  Findings of right MCA occlusion with acute/subacute punctate infarcts in the right frontal white matter and small right parietal region cortical infarcts and several chronic right cerebral infarcts as detailed above.   2.  Probable additional punctate recent left basal ganglia lacunar infarct.   3.  Additional chronic lacunar infarcts in the basal ganglia, thalami and right baldo.   4.  Generalized cerebral atrophy and chronic microvascular ischemic type changes.   5.  No acute intracranial hemorrhage.      EC-ECHOCARDIOGRAM COMPLETE W/O CONT   Final Result      CT-CTA HEAD WITH & W/O-POST PROCESS   Final Result         1.  Occlusion at the right M1 segment with no significant reconstitution to the right middle cerebral artery distribution   2.  50% narrowing of the distal basilar artery   3.  Changes of atrophy with nonspecific white matter changes most commonly associated with small vessel ischemia.   4.  Right frontal and parietal encephalomalacia.      These findings were discussed with the patient's clinician, Dr. Galindo, on 12/15/2020 3:14 AM.      CT-CTA NECK WITH & W/O-POST PROCESSING   Final Result          1.  No high-grade stenosis, aneurysm, dissection, or occlusion. See dedicated CT angiogram the head for intracranial findings   2.  Reticular opacities in the right upper lobe, appearance suggests changes of Covid infiltrate.              Assessment/Plan  * Acute respiratory failure with hypoxia (HCC)  Assessment & Plan  Patient with new acute hypoxic respiratory failure on 12/22  CXR with worsening right sided infiltrates  Due to worsening COVID19 PNA. Differentials include aspiration pneumonia, however with negative procalcitonin Zosyn discontinued  Pulmonary embolism low on differential due to normal D-Dimer  Started on Broad spectrum Abx, deescalated to Zosyn to cover for possible aspiration. As procalcitonin negative will Zosyn discontinued  Started on Dexamethasone for 10 day course due to COVID19  Consulted ID and he completed Remdesivir for 5 day course    Patient status worsening 12/27 placed on high flow nasal cannula  Ddimer negative, risk of PE low  Continue supportive care  Oxygen support, wean as tolerated  Prone as tolerated    Sepsis (HCC)  Assessment & Plan  This is Sepsis Not present on admission  SIRS criteria identified on my evaluation include: Fever, with temperature greater than 101 deg F, Tachycardia, with heart rate greater than 90 BPM and Tachypnea, with respirations greater than 20 per minute  Source is Pulmonary, unclear if secondary to aspiration PNA or worsening COVID19    Diagnosed 12/22, IVF limited in setting of COVID19  Started on broad spectrum Abx (Zyvox, Zosyn, Flagyl) as was previously on Ceftriaxone and Augmentin. Abx deescalated to Zosyn. Negative procalcitonin. Stopped Zosyn 12/24  Sepsis due to acute hypoxic respiratory failure from COVID19 pneumonia    While organ dysfunction may be noted elsewhere in this problem list or in the chart, degree of organ dysfunction does not meet CMS criteria for severe sepsis          CVA (cerebrovascular accident) (HCC)- (present on  admission)  Assessment & Plan  Likely embolic in etiology.  He was not taking his antiplatelet and anticoagulant PTA.  Neurology saw and signed off.  He is back to his baseline and all appropriate medications were restarted and [facility].  Effient changed to Brilinta.  Pradaxa restarted.  He can not stop taking these two medications unless told otherwise by his doctors.  Cont statin.      Suicidal ideation  Assessment & Plan  Endorsed thoughts to kill himself 12/20 evening. Was placed on legal hold.  Psychiatry consulted and he was taken off legal hold 12/25      Right lower quadrant abdominal pain  Assessment & Plan  C/o abdominal pain new on this admission associated with reported diarrhea.  CT Abdomin and Pelvis on 12/21 without etiology  Cdiff negative  Pain improved    Generalized anxiety disorder  Assessment & Plan  Appreciate Psych involvement  Started on Klonopin 0.5mg BID    Adjustment disorder with depressed mood  Assessment & Plan  Appreciate psychiatry involvement  Lexapro 20mg, Remeron 7.5mg for sleep      Acute kidney injury (HCC)  Assessment & Plan  Resolved  Continue to monitor renal function    Melena  Assessment & Plan  Positive fecal occult blood test, unclear etiology but is on blood thinner  Start Omeprazole BID  Trend H/H  Consider GI consult if worsens    Coronary artery disease involving native coronary artery of native heart- (present on admission)  Assessment & Plan  Had stents placed in 10/20.  Now on Brillinta instead of Effient given acute stroke.  On statin.      Pneumonia due to COVID-19 virus- (present on admission)  Assessment & Plan  See above    DIABETES MELLITUS  Assessment & Plan  Patient will be on insulin sliding scale to keep blood sugars less than 180 while hospitalized    Hyperlipemia- (present on admission)  Assessment & Plan  Continue high intensity statins    Hypertension- (present on admission)  Assessment & Plan  On lisinopril, amlodipine.          VTE prophylaxis: Pradaxa

## 2020-12-28 NOTE — PROGRESS NOTES
Assumed care of pt. Beside report received from day shift RN. Pt alert and oriented X3. Pt refused tele, tele not on. Pt on 6L nasal canula satting 90%. Pt not in any signs of respiratory distress at this time. Bed low and locked. Call light and pt belongings within reach. All needs met at this time.

## 2020-12-28 NOTE — THERAPY
Speech Language Pathology  Daily Treatment     Patient Name: Emeterio Christianson  Age:  65 y.o., Sex:  male  Medical Record #: [id number]  Today's Date: 12/28/2020     Precautions  Precautions: Fall Risk, Swallow Precautions ( See Comments)  Comments: acute CVA    Assessment    Pt seen on this date for dysphagia therapy. Per RN, pt given water today and had coughing events in 2/2 opportunities. Per MD, pt tolerated PO well up until last night where pt required increased oxygen needs (60L high flow with 15L non-rebreather mask). Pt oriented to all spheres except month and agreeable to PO trials. Anterior loss of bolus noted in 100% of water trials and increase in audible wet upper airway congestion and wet vocal quality appreciated which is highly concerning for aspiration. Drop in 02 saturations appreciated following sips of thins and required ~2 minutes to recover. He rolled over onto side and refused further PO. Session discussed with MD as unable to provide a safe diet recommendation at this time given poor participation and aspiration concerns and okay to make pt NPO pending re-evaluation, as appropriate, tomorrow 12/29. Okay for pertinent medications crushed in puree per MD. SLP to follow.    Plan    NPO pending re-evaluation of swallow on 12/29. Okay for medications crushed in apple sauce per MD    Continue current treatment plan.    Discharge Recommendations: Recommend post-acute placement for additional speech therapy services prior to discharge home       Objective       12/28/20 1517   Vitals   O2 (LPM) 60  (with 15L non-rebreather)   O2 Delivery Device Heated High Flow Nasal Cannula   Pain 0 - 10 Group   Therapist Pain Assessment Post Activity Pain Same as Prior to Activity;Nurse Notified;0   Cognitive-Linguistic   Level of Consciousness Alert   Orientation Level Not Oriented to Month   Social / Pragmatic Communication   Comments intermittent crying out, unable to pacify   Dysphagia    Dysphagia X   Positioning /  Behavior Modification Modulate Rate or Bite Size   Other Treatments trials of thin liquids, refused all other PO   Diet / Liquid Recommendation NPO;Pre-Feeding Trials with SLP Only   Nutritional Liquid Intake Rating Scale Nothing by mouth   Nutritional Food Intake Rating Scale Nothing by mouth   Skilled Intervention Compensatory Strategies;Verbal Cueing   Recommended Route of Medication Administration   Medication Administration  Crush all Medications in Puree   Short Term Goals   Short Term Goal # 1 Pt will consume a diet of SB6/TN0 with no s/sx of aspiration and min cues.    Goal Outcome # 1 Goal not met

## 2020-12-28 NOTE — CONSULTS
PSYCHIATRIC FOLLOW-UP:(established)  *Reason for admission: sudden onset left-sided weakness         *Legal Hold Status: not applicable                *HPI: on O2 mask. Barely rouses.           *Psychiatric Examination:   Vitals:   Vitals:    12/28/20 0301 12/28/20 0315 12/28/20 0735 12/28/20 0751   BP:  138/76  132/76   Pulse: (!) 110 (!) 112 97 98   Resp: (!) 23 20 (!) 21 19   Temp:  36.1 °C (97 °F)  37 °C (98.6 °F)   TempSrc:  Temporal  Temporal   SpO2: 97% 90% 92% 93%   Weight:       Height:         General Appearance:  poor eye contact  Abnormal Movements: none   Gait and Posture: not observed  Speech: mute  Thought Process: unable to assess  Associations:   unable to assess  Abnormal or Psychotic Thoughts: unable to assess  Judgement and Insight: unable to assess  Orientation: unable to determine  Recent and Remote Memory: unable to determine  Attention Span and Concentration: diminished  Language:unable to assess  Fund of Knowledge: unable to assess  Mood and Affect: blunted  SI/HI:  unable to assess  MMSE score and/or clock drawing:not applicable      *ASSESSMENT/RECOMENDATIONS:  1. Adjustment disorder with depressed mood  - R/O major depression  - lexapro 20 mg  - 7.5 mg for sleep     2. R/O neurocognitive disorder  -consider speech cog eval        3.Generalized anxiety disorder  -klonopin 0.5 mg bid     4. 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 L sided weakness  -DM  -HTN  -acute respiratory failure related to sepsis     Legal hold: not applicable  Observation status: routine  Privileges (while on legal hold): no restrictions      *Will Follow

## 2020-12-28 NOTE — DOCUMENTATION QUERY
[facility]                                                                       Query Response Note      PATIENT:               HA CEJA  ACCT #:                  [id number]  MRN:                     [id number]  :                      1955  ADMIT DATE:       2020 5:50 PM  DISCH DATE:          RESPONDING  PROVIDER #:        [id number]           QUERY TEXT:    Please clarify in documentation the relationship, if any, between acute respiratory failure and sepsis.    The patient's Clinical Indicators include:  H&P: CVA; DM; HTN;  positive COVID-19   MD PN: Sepsis, source is pulmonary. Acute respiratory failure w/ hypoxia. Pneumonia.  Treatment: Decadron; antibiotics; remdesivir; oxygen; oximetry; lab/imaging  Risk Factors: COVID-19; pneumonia; acute respiratory failure w/ hypoxia    Thank You,  Trinh Stiles RN  Clinical    Connect via FriendCode  Options provided:   -- Condition acute respiratory failure is due to or associated with sepsis   -- Unrelated to each other   -- Unable to determine      Query created by: Trinh Stiles on 2020 7:19 AM    RESPONSE TEXT:    Condition acute respiratory failure is due to or associated with sepsis          Electronically signed by:  CLAUDIO GAMBINO MD 2020 7:21 AM

## 2020-12-28 NOTE — PROGRESS NOTES
Pt began complaining of chest pain 10/10. Pt 02 sats dropped to 79% on 6L nasal canula. Increased 02 to 15L non-rebreather and pt 02 sats still did not increase. Increased work of breathing and chest pain. Pt had a change in mental status. PT went from A&O 3 to A&O 1 oriented to self. Pt was confused and began mumbling. Rapid was called. Notified . STAT chest xray ordered. ABG's ordered. Systolic BP was elevated in the 160's. Labetalol 10mg given per Rapid team. Pt was put onto high flow 60L 100% Fi02 now sating 94%. PT is more responsive, A&O 3 disoriented to time. Updated Dr. Yusuf regarding pt's condition. New orders in for 40mg lasix once. Pt output 450mL. Vital signs stable, pt resting in bed. Will continue to monitor.

## 2020-12-29 NOTE — PROGRESS NOTES
Hospital Medicine Daily Progress Note    Date of Service  12/29/2020    Chief Complaint  65 y.o. male admitted 12/14/2020 with left sided weakness    Hospital Course  65 years old man with cardiac history, risk factor presented with left-sided weakness secondary to acute CVA.  CT angio of the head and neck show occluded right MCA with 50% stenosis of the basilar artery.   MRI consistent with stroke, embolic likely.  Underlying etiology due to non-compliance with antiplatelet and anticoagulation therapy [facility]. [facility] and [facility] for bed availability.    On hospital day #3 patient developed fever and had CXR consistent with PNA. He was started on Augmentin for PNA.    On hospital day #7 patient expressed suicidal ideation. Psych consulted and was placed on legal hold. Patient also complained of diffuse abdominal pain. CT A/P negative for etiology of pain and Cdiff was negative. He was noted to have melena; however, H/H stable. He was started on PPI BID.    Early morning on 12/22, patient was noted to have sepsis parameters and increased oxygen needs from RA to 15L facemask. Differential diagnosis included aspiration pneumonitis and worsening COVID19 PNA. Patient had negative d-dimer, which makes PE unlikely. Procalcitonin negative, but some concern over aspiration. Patient was started on broad spectrum Abx which was deescalated to Zosyn. Discussed with ID and was started on Decadron 6mg daily for 10 day course and Remdesivir for 5 day course. Repeat procalcitonins negative, so stopped Zosyn 12/24.    Patient re-evaluated by psych on 12/25 and legal hold removed as he was deemed to no longer have suicidal ideation. Psych diagnosed adjustment disorder with depression and generalized anxiety disorder. He was started on Lexapro, Remeron, and Klonopin 0.5mg BID by psychiatry.    12/27 Rapid response called for worsening hypoxia. Patient required high flow  nasal cannula. Given lasix with some diuresis; however, did not appear volume overloaded. He completed remdesivir. Continued supportive care with oxygen support. Patient evaluated by SLP who recommended a soft and bite sized diet with thin liquids.    12/28: Nurse concerned about his ability to swallow. He was unable to work with SLP due to respiratory distress. Pt was made NPO with medications only for now. Hold IVF in setting of COVID19. CXR showed Mild worsening hypoinflation and patchy bilateral opacities suggesting Covid 19 pneumonia     Interval Problem Update  Vitals reviewed; afebrile.  RR in low 20s and HR in 90-100s. He has been on HFNC 60L FiO2 100% for about last 48hrs - only maintaining around 89-90% on it during rounds.     Meanwhile, Pt was re-eval by SLP - high risks for aspiration; hence, recommended NPO and Cortrek placement.     At bedside, current findings discussed with Pt. Pt is AOx3 and situation (same with nursing assessment as well). Pt expresses understandings of risks associated with oral feed. He does not want to be intubated or have chest compression done on him when the time comes. He would rather be made comfortable going forward with pleasure eating and pain medications as needed.     In my clinical opinion, Pt has a decisional capacity to make this choice for himself.      AM labs reviewed.     Consultants/Specialty  Neurology  Infectious Disease  Psychiatry     Code Status  Comfort Care/DNR    Disposition  Referral to Hospice placed    Discussed with patient, patient's nurse and with multidisciplinary team during rounds including , pharmacist and charge nurse.      I have performed the physical examination, and reviewed updated ROS and plan today 12/29/2020.  In review of yesterday's note, there are no new changes except as documented above or bolded below.    Review of Systems  Review of Systems   Constitutional: Positive for chills, fever and malaise/fatigue.   HENT:  Negative.  Negative for hearing loss and tinnitus.    Eyes: Negative.  Negative for photophobia and pain.   Respiratory: Positive for cough and shortness of breath.    Cardiovascular: Negative for chest pain, palpitations and leg swelling.   Gastrointestinal: Positive for abdominal pain. Negative for diarrhea, nausea and vomiting.   Genitourinary: Negative.  Negative for dysuria, frequency and urgency.   Musculoskeletal: Positive for back pain, joint pain and myalgias.   Skin: Negative.  Negative for rash.   Neurological: Negative.  Negative for dizziness and headaches.   Psychiatric/Behavioral: Positive for depression. Negative for suicidal ideas. The patient is nervous/anxious.         Physical Exam  Temp:  [36.1 °C (97 °F)-37 °C (98.6 °F)] 36.1 °C (97 °F)  Pulse:  [] 114  Resp:  [18-23] 22  BP: (108-148)/(70-89) 109/75  SpO2:  [90 %-99 %] 92 %    Physical Exam  Constitutional:       General: He is in acute distress.      Appearance: He is ill-appearing.   HENT:      Head: Normocephalic.      Nose: Nose normal.      Mouth/Throat:      Mouth: Mucous membranes are moist.      Pharynx: Oropharynx is clear.   Eyes:      Pupils: Pupils are equal, round, and reactive to light.   Neck:      Musculoskeletal: Normal range of motion. No neck rigidity.   Cardiovascular:      Rate and Rhythm: Normal rate and regular rhythm.   Pulmonary:      Breath sounds: No stridor. Rhonchi and rales present. No wheezing.   Abdominal:      General: Abdomen is flat.      Palpations: Abdomen is soft.      Tenderness: There is no abdominal tenderness. There is no guarding or rebound.   Musculoskeletal:      Right lower leg: No edema.      Left lower leg: No edema.   Skin:     General: Skin is warm and dry.      Capillary Refill: Capillary refill takes less than 2 seconds.   Neurological:      General: No focal deficit present.      Mental Status: He is alert.   Psychiatric:      Comments: Dysphoric mood       Fluids    Intake/Output  Summary (Last 24 hours) at 12/29/2020 1219  Last data filed at 12/28/2020 1600  Gross per 24 hour   Intake --   Output 800 ml   Net -800 ml       Laboratory  Recent Labs     12/28/20  0859 12/29/20  1010   WBC 11.7* 11.4*   RBC 4.48* 4.74   HEMOGLOBIN 13.8* 14.4   HEMATOCRIT 41.8* 45.4   MCV 93.3 95.8   MCH 30.8 30.4   MCHC 33.0* 31.7*   RDW 43.8 45.9   PLATELETCT 347 360   MPV 10.2 10.5     Recent Labs     12/27/20  0723 12/28/20  0859 12/29/20  1010   SODIUM 138 141 140   POTASSIUM 3.8 3.7 4.2   CHLORIDE 107 108 106   CO2 17* 18* 17*   GLUCOSE 116* 136* 139*   BUN 21 24* 49*   CREATININE 0.74 0.90 1.61*   CALCIUM 9.0 9.4 9.5     Recent Labs     12/27/20  2114   APTT 57.2*   INR 1.40*               Imaging  DX-CHEST-PORTABLE (1 VIEW)   Final Result      Mild worsening hypoinflation and patchy bilateral opacities suggesting Covid 19 pneumonia.      DX-CHEST-PORTABLE (1 VIEW)   Final Result      Mildly improved aeration with mildly improved hazy bilateral pulmonary opacities.      DX-CHEST-LIMITED (1 VIEW)   Final Result      Mildly increased right upper lobe opacity and new ill-defined hazy bibasilar opacities consistent with multifocal pneumonia.      CT-ABDOMEN-PELVIS WITH   Final Result      1.  No acute inflammatory process in the abdomen or pelvis.   2.  Liquid stool in the colon and rectum, suggestive of diarrhea.   3.  Ill-defined opacities in the periphery of the lower lungs, likely sequela of COVID pneumonia.      DX-CHEST-PORTABLE (1 VIEW)   Final Result      Ill-defined airspace opacities in the right upper lobe worrisome for pneumonia.      Mild cardiomegaly.         CT-HEAD W/O   Final Result      Small acute right frontoparietal infarcts were better assessed on same day MRI.      Old lacunar infarcts in the right frontoparietal white matter, basal ganglia, right insula and right baldo.      Atrophy         MR-BRAIN-W/O   Final Result      1.  Findings of right MCA occlusion with acute/subacute punctate  infarcts in the right frontal white matter and small right parietal region cortical infarcts and several chronic right cerebral infarcts as detailed above.   2.  Probable additional punctate recent left basal ganglia lacunar infarct.   3.  Additional chronic lacunar infarcts in the basal ganglia, thalami and right baldo.   4.  Generalized cerebral atrophy and chronic microvascular ischemic type changes.   5.  No acute intracranial hemorrhage.      EC-ECHOCARDIOGRAM COMPLETE W/O CONT   Final Result      CT-CTA HEAD WITH & W/O-POST PROCESS   Final Result         1.  Occlusion at the right M1 segment with no significant reconstitution to the right middle cerebral artery distribution   2.  50% narrowing of the distal basilar artery   3.  Changes of atrophy with nonspecific white matter changes most commonly associated with small vessel ischemia.   4.  Right frontal and parietal encephalomalacia.      These findings were discussed with the patient's clinician, Dr. Galindo, on 12/15/2020 3:14 AM.      CT-CTA NECK WITH & W/O-POST PROCESSING   Final Result         1.  No high-grade stenosis, aneurysm, dissection, or occlusion. See dedicated CT angiogram the head for intracranial findings   2.  Reticular opacities in the right upper lobe, appearance suggests changes of Covid infiltrate.              Assessment/Plan  * Acute respiratory failure with hypoxia (HCC)  Assessment & Plan  Patient with new acute hypoxic respiratory failure on 12/22  CXR with worsening right sided infiltrates  Due to worsening COVID19 PNA. Differentials include aspiration pneumonia, however, with negative procalcitonin, Zosyn discontinued  Pulmonary embolism low on differential due to normal D-Dimer  Has now completed dexamethasone for 10 day course and Remdesivir for 5 day course    Patient status worsening 12/27 placed on high flow nasal cannula   Ddimer negative, risk of PE low  Patient was continue supportive care with oxygen support    Pt has  been on HFNC 60L FiO2 100% for about last 48hrs without improvement. Goals of Care going forward is discussed. He decides to be made DNR/DNI and wishes to be Comfort Care.     Sepsis (HCC)  Assessment & Plan  This is Sepsis Not present on admission  SIRS criteria identified on admissionn include: Fever, with temperature greater than 101 deg F, Tachycardia, with heart rate greater than 90 BPM and Tachypnea, with respirations greater than 20 per minute  Source is Pulmonary, unclear if secondary to aspiration PNA or worsening COVID19    Diagnosed 12/22, IVF limited in setting of COVID19  Started on broad spectrum Abx (Zyvox, Zosyn, Flagyl) as was previously on Ceftriaxone and Augmentin. Abx deescalated to Zosyn. Negative procalcitonin. Stopped Zosyn 12/24  Sepsis due to acute hypoxic respiratory failure from COVID19 pneumonia    While organ dysfunction may be noted elsewhere in this problem list or in the chart, degree of organ dysfunction does not meet CMS criteria for severe sepsis          CVA (cerebrovascular accident) ([facility])- (present on admission)  Assessment & Plan  Likely embolic in etiology.  He was not taking his antiplatelet and anticoagulant PTA.  Neurology saw and signed off.  He was back to his baseline and all appropriate medications were restarted and [facility].    Effient changed to Brilinta.  Pradaxa restarted during this admission.   Now with Comfort Care status started, will stop these meds        Suicidal ideation  Assessment & Plan  Endorsed thoughts to kill himself 12/20 evening. Was placed on legal hold.  Psychiatry consulted and he was taken off legal hold 12/25      Right lower quadrant abdominal pain  Assessment & Plan  C/o abdominal pain new on this admission associated with reported diarrhea.  CT Abdomin and Pelvis on 12/21 without etiology  Cdiff negative  Pain improved    Generalized anxiety disorder  Assessment & Plan  Appreciate Psych involvement  Started on Klonopin  0.5mg BID    Adjustment disorder with depressed mood  Assessment & Plan  Appreciate psychiatry involvement  Lexapro 20mg, Remeron 7.5mg for sleep started but now with Comfort Care status started, will stop these meds      Acute kidney injury (HCC)  Assessment & Plan  Resolved  Continue to monitor renal function    Melena  Assessment & Plan  Positive fecal occult blood test, unclear etiology but is on blood thinner  Start Omeprazole BID  Trend H/H  Consider GI consult if worsens    Coronary artery disease involving native coronary artery of native heart- (present on admission)  Assessment & Plan  Had stents placed in 10/20.  Has been on Brillinta and statin.    Now with Comfort Care status started, will stop these meds        Pneumonia due to COVID-19 virus- (present on admission)  Assessment & Plan  See above    Hyperlipemia- (present on admission)  Assessment & Plan  now with Comfort Care status started, will hold this med        Hypertension- (present on admission)  Assessment & Plan  Will monitor       VTE prophylaxis: stop as Pt will on Comfort Care; SCD if tolerated

## 2020-12-29 NOTE — PROGRESS NOTES
"Received report from day shift RN. Unable to assess orientation, pt refusing and stating \"no\" to most questions. Pt is on 60 L 100% HF. Assessment complete and labs noted. Patient is educated to call light and belongings within reach. Tele sitter in place and lap belt in place. Bed is locked and in lowest position. Bed alarm is on. No further needs at this time. Will continue to monitor  "

## 2020-12-29 NOTE — PROGRESS NOTES
"Patient agitated. Went in to reposition him and he demanded water. I educated him that he did not pass a swallow eval with SLP yesterday and is NPO at the moment. Patient started to get more angry and stated, \"fuck off you fucking bitch.\" He was repositioned supine and lines adjusted to his current position. High flow readjusted to proper position. Will continue to monitor.   "
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
## 2020-12-29 NOTE — PROGRESS NOTES
Patient stated he did not want CPR/medications/intubation if his heart were to stop. Patient A/Ox4 at this time. I discussed DNR/DNI status. Patient stated this is what he wanted at this time. MD notified and discussing plan of care with patient at bedside. Will continue to monitor.

## 2020-12-29 NOTE — THERAPY
"Speech Language Pathology  Daily Treatment     Patient Name: Emeterio Christianson  Age:  65 y.o., Sex:  male  Medical Record #: [id number]  Today's Date: 12/29/2020     Precautions  Precautions: (P) Fall Risk, Swallow Precautions ( See Comments)  Comments: (P) acute CVA, maxed out on 02    Assessment    Pt seen on this date for a swallow re-evaluation. Per RN, pt agitated and now in bilateral wrist restrains as he is attempting to pull off 02. Pt still on 60L at 100% Fi02 with 15L non-rebreather. Pt oriented to all spheres except date and agreeable to PO trials. He does not recall this clinician seeing him yesterday and confusion noted through out session. Coughing, wet vocal quality following the swallow, and throat clearing appreciated with trials of MTL, apple sauce, and pudding and immediate coughing event with desaturation down to 83% noted with sips of thin liquids which is highly concerning for aspiration. He intermittently required cues to open mouth for presentation of bolus and remove bolus from spoon and anterior loss of bolus noted intermittently with trials. Upon palpation, laryngeal elevation was complete and delayed swallow trigger up to 2-3 seconds noted. Pt with very poor insight into deficits and despite max education regarding aspiration risk, high oxygen needs, and role of SLP stated numerous times \"you are trying to kill me!\". Given pt's cognition, high oxygen needs, and s/sx of aspiration strongly recommend NPO with consideration for a non-oral source of nutrition. Okay for 2-3 small ice chips an hour as tolerated with RN only. RN and MD updated on session and recommendations.    Plan    Given pt's cognition, high oxygen needs, and s/sx of aspiration strongly recommend NPO with consideration for a non-oral source of nutrition. Okay for 2-3 small ice chips an hour with RN only as tolerated.    Continue current treatment plan.    Discharge Recommendations: (P) Recommend post-acute placement for additional " speech therapy services prior to discharge home       Objective       12/29/20 1046   Vitals   O2 (LPM) 60   O2 Delivery Device Heated High Flow Nasal Cannula  (15 L non-rebreather on top)   Pain 0 - 10 Group   Therapist Pain Assessment Post Activity Pain Same as Prior to Activity;Nurse Notified  (c/o back pain, did not quantify)   Cognitive-Linguistic   Level of Consciousness Agitated   Orientation Level Not Oriented to Year   Dysphagia    Dysphagia X   Positioning / Behavior Modification Modulate Rate or Bite Size;Self Monitoring   Other Treatments trials of ice, MTL, apple sauce, pudding, and thin liquids   Diet / Liquid Recommendation NPO;Pre-Feeding Trials with SLP Only   Nutritional Liquid Intake Rating Scale Nothing by mouth   Nutritional Food Intake Rating Scale Nothing by mouth   Skilled Intervention Compensatory Strategies;Verbal Cueing   Recommended Route of Medication Administration   Medication Administration  Via Gastric Tube   Short Term Goals   Short Term Goal # 1 Pt will consume a diet of SB6/TN0 with no s/sx of aspiration and min cues.    Goal Outcome # 1 Goal not met   Short Term Goal # 2 NEW 12/29: Pt will consume prefeeding trials with no overt s/sx of aspiration

## 2020-12-30 NOTE — DISCHARGE SUMMARY
Death Summary    Cause of Death  Respiratory arrest due to acute hypoxic respiratory failure due to pneumonia due to Covid-19 virus infection.    Comorbid Conditions at the Time of Death  Principal Problem:    Acute respiratory failure with hypoxia (HCC) POA: No  Active Problems:    CVA (cerebrovascular accident) (HCC) POA: Yes    Sepsis (HCC) POA: No    Right lower quadrant abdominal pain POA: No    Suicidal ideation POA: No    Hypertension POA: Yes    Hyperlipemia POA: Yes    Pneumonia due to COVID-19 virus POA: Yes    Coronary artery disease involving native coronary artery of native heart POA: Yes    Melena POA: Unknown    Acute kidney injury (HCC) POA: Unknown    Adjustment disorder with depressed mood POA: Unknown    Generalized anxiety disorder POA: Unknown  Resolved Problems:    Pneumonia due to infectious organism POA: No      History of Presenting Illness and Hospital Course  65 years old man with cardiac history, risk factor presented with left-sided weakness secondary to acute CVA.  CT angio of the head and neck show occluded right MCA with 50% stenosis of the basilar artery.   MRI consistent with stroke, embolic likely.  Underlying etiology due to non-compliance with antiplatelet and anticoagulation [facility]. [facility] and [facility] to [facility] for bed availability.     On hospital day #3 patient developed fever and had CXR consistent with PNA. He was started on Augmentin for PNA.     On hospital day #7 patient expressed suicidal ideation. Psych consulted and was placed on legal hold. Patient also complained of diffuse abdominal pain. CT A/P negative for etiology of pain and Cdiff was negative. He was noted to have melena; however, H/H stable. He was started on PPI BID.     Early morning on 12/22, patient was noted to have sepsis parameters and increased oxygen needs from RA to 15L facemask. Differential diagnosis included aspiration pneumonitis  and worsening COVID19 PNA. Patient had negative d-dimer, which makes PE unlikely. Procalcitonin negative, but some concern over aspiration. Patient was started on broad spectrum Abx which was deescalated to Zosyn. Discussed with ID and was started on Decadron 6mg daily for 10 day course and Remdesivir for 5 day course. Repeat procalcitonins negative, so stopped Zosyn 12/24.     Patient re-evaluated by psych on 12/25 and legal hold removed as he was deemed to no longer have suicidal ideation. Psych diagnosed adjustment disorder with depression and generalized anxiety disorder. He was started on Lexapro, Remeron, and Klonopin 0.5mg BID by psychiatry.     12/27 Rapid response called for worsening hypoxia. Patient required high flow nasal cannula. Given lasix with some diuresis; however, did not appear volume overloaded. He completed remdesivir. Continued supportive care with oxygen support. Patient evaluated by SLP who recommended a soft and bite sized diet with thin liquids.     12/28: Nurse concerned about his ability to swallow. He was unable to work with SLP due to respiratory distress. Pt was made NPO with medications only for now. Hold IVF in setting of COVID19. CXR showed Mild worsening hypoinflation and patchy bilateral opacities suggesting Covid 19 pneumonia.    12/29:  He has been on HFNC 60L FiO2 100% for about last 48hrs - only maintaining around 89-90% on it during rounds.      Meanwhile, Pt was re-eval by SLP - high risks for aspiration; hence, recommended NPO and Cortrek placement.      At bedside, findings discussed with Pt. Pt was AOx3 and situation (same with nursing assessment as well). Pt expressed understandings of risks associated with oral feed. He does not want to be intubated or have chest compression done on him when the time comes. He would rather be made comfortable going forward with pleasure eating and pain medications as needed. Clinically, it was determined that he has a decisional  capacity to make this choice for himself. Comfort care order placed.     : overnight, Pt was placed on comfort care with O2 as tolerated and pain management. This AM around the time below, nursing teams informed that Pt has .     Death Date: 20   Death Time: 1034         Pronounced By (RN1): Lacey Batres RN  Pronounced By (RN2): Julienne Paredes RN

## 2020-12-30 NOTE — PROGRESS NOTES
"      Spiritual Care Note    Patient Information     Patient's Name: Emeterio Christianson   MRN: [id number]    YOB: 1955   Age and Gender: 65 y.o. male   Service Area: [address]   Room (and Bed): T7/   Ethnicity or Nationality:     Primary Language: English   Anabaptism/Spiritual preference: Non-Anabaptism   Place of Residence: [place]   Family/Friends/Others Present: no   Clinical Team Present: RN   Medical Diagnosis(-es)/Procedure(s): CVA   Code Status: Comfort Care/DNR    Date of Admission: 12/14/2020   Length of Stay: 15 days        Spiritual Care Provider Information:  Name of Spiritual Care Provider: Luz Álvarez  Title of Spiritual Care Provider: Associate   Phone Number: [phone]  E-mail: [email or web address]  Total time : 25 minutes    Spiritual Screen Results:        Encounter/Request Information  Encounter/Request Type     Visited With: Patient, Health care provider(KADEEM Morris)    Nature of the Visit: Initial, On shift    Continue Visiting: Yes    Next Follow-up Date: 12/30/20    Crisis Visit: Patient actively dying/EOL(Comfort Care)    Referral From/ Origin of Request: Epic nursing    Religous Needs/Values  Anabaptism Needs Visit    Anabaptism Needs: Prayer    Spiritual Assessment   Spiritual Care Encounters    Observations/Symptoms: Pain, Frustration    Interacton/Conversation:  checked in with BS RN then introduced self to pt.  Pt was moaning in pain, multiple times calling out, \"Help me, Help me, my back.  The pain.  Help me, help me.\"  Pt was laying on his left with eyes closed.  Pt stated, \"pray for me\".   with pt, offered therapeutic touch and words of encouragement for pt.  RN offered compassionate support for pt as well.   will check on pt tomorrow.    Assessment: Need    Need: Seeking Spiritual Assistance and Support    Interventions: Companionship, Spiritual Support    Outcomes: (Pt still moaning when  " left)    Plan: Daily Visits    Notes:

## 2020-12-30 NOTE — PROGRESS NOTES
Report received from day shift RN. Pt is A&Ox4. Pt is on RA. Assessment complete, vital signs charted and labs noted. Call light and belongings within reach. Bed is locked and in lowest position. Pt is resting comfortably. Tele sitter in place. Pt is comfort care. No further needs at this time. Will continue to monitor

## 2020-12-30 NOTE — THERAPY
Missed Therapy     Patient Name: Emeterio Christianson  Age:  65 y.o., Sex:  male  Medical Record #: [id number]  Today's Date: 12/30/2020 12/30/20 0832   Treatment Variance   Reason For Missed Therapy Medical - Other (Please Comment)   Interdisciplinary Plan of Care Collaboration   Collaboration Comments Per chart review, patient has transitioned to comfort care measures. SLP is not actively following. Please re-consult with change in status.

## 2020-12-30 NOTE — PROGRESS NOTES
Spoke to Corinne  about patient's belongings, sister Judi wants belongings sent to  home. Corinne working on transport back to family who apparently live in [place].

## 2020-12-31 NOTE — PROGRESS NOTES
CMS restraints review recorded in quality log on 12/31/2020 @ 8119   appt already scheduled for 12/10/19

## 2020-12-31 NOTE — PROGRESS NOTES
Spoke with patient's sister Patt Torres ([phone]) regarding belongings. Referred her to [employer] at [phone]. Patt is interested in getting belongings shipped to her because they have no way to physically pick them up.

## 2022-05-09 NOTE — DISCHARGE PLANNING
Action: Patient . Spoke with BS RN. BS RN already notified family. No need for RN CM to notify family. RN CM called Patient's POA, Giovanna, but LVM for return call. Giovanna hasn't returned calls during this patient's hospitalization. Family already chose a mortuary yesterday, see previous notes for details. Will update NAM.     Goal Outcome Evaluation:           Progress: no change  Outcome Evaluation: pt. to d/c to [facility]